# Patient Record
Sex: FEMALE | Race: BLACK OR AFRICAN AMERICAN | Employment: OTHER | URBAN - METROPOLITAN AREA
[De-identification: names, ages, dates, MRNs, and addresses within clinical notes are randomized per-mention and may not be internally consistent; named-entity substitution may affect disease eponyms.]

---

## 2017-07-28 ENCOUNTER — OP HISTORICAL/CONVERTED ENCOUNTER (OUTPATIENT)
Dept: OTHER | Age: 82
End: 2017-07-28

## 2017-09-19 ENCOUNTER — IP HISTORICAL/CONVERTED ENCOUNTER (OUTPATIENT)
Dept: OTHER | Age: 82
End: 2017-09-19

## 2018-06-21 ENCOUNTER — OP HISTORICAL/CONVERTED ENCOUNTER (OUTPATIENT)
Dept: OTHER | Age: 83
End: 2018-06-21

## 2018-06-28 ENCOUNTER — OP HISTORICAL/CONVERTED ENCOUNTER (OUTPATIENT)
Dept: OTHER | Age: 83
End: 2018-06-28

## 2018-06-29 ENCOUNTER — OP HISTORICAL/CONVERTED ENCOUNTER (OUTPATIENT)
Dept: OTHER | Age: 83
End: 2018-06-29

## 2018-07-31 ENCOUNTER — OP HISTORICAL/CONVERTED ENCOUNTER (OUTPATIENT)
Dept: OTHER | Age: 83
End: 2018-07-31

## 2018-08-14 ENCOUNTER — OP HISTORICAL/CONVERTED ENCOUNTER (OUTPATIENT)
Dept: OTHER | Age: 83
End: 2018-08-14

## 2018-09-19 ENCOUNTER — OP HISTORICAL/CONVERTED ENCOUNTER (OUTPATIENT)
Dept: OTHER | Age: 83
End: 2018-09-19

## 2019-01-08 ENCOUNTER — ED HISTORICAL/CONVERTED ENCOUNTER (OUTPATIENT)
Dept: OTHER | Age: 84
End: 2019-01-08

## 2019-01-16 ENCOUNTER — OP HISTORICAL/CONVERTED ENCOUNTER (OUTPATIENT)
Dept: OTHER | Age: 84
End: 2019-01-16

## 2019-03-11 ENCOUNTER — OFFICE VISIT (OUTPATIENT)
Dept: FAMILY MEDICINE CLINIC | Age: 84
End: 2019-03-11

## 2019-03-11 VITALS
HEIGHT: 60 IN | OXYGEN SATURATION: 97 % | SYSTOLIC BLOOD PRESSURE: 173 MMHG | TEMPERATURE: 97.8 F | BODY MASS INDEX: 30.43 KG/M2 | DIASTOLIC BLOOD PRESSURE: 64 MMHG | WEIGHT: 155 LBS | RESPIRATION RATE: 16 BRPM | HEART RATE: 53 BPM

## 2019-03-11 DIAGNOSIS — E87.0 HYPERNATREMIA: ICD-10-CM

## 2019-03-11 DIAGNOSIS — H93.19 TINNITUS, UNSPECIFIED LATERALITY: ICD-10-CM

## 2019-03-11 DIAGNOSIS — G47.8 UNREFRESHED BY SLEEP: ICD-10-CM

## 2019-03-11 DIAGNOSIS — E11.65 TYPE 2 DIABETES MELLITUS WITH HYPERGLYCEMIA, WITHOUT LONG-TERM CURRENT USE OF INSULIN (HCC): ICD-10-CM

## 2019-03-11 DIAGNOSIS — Z13.220 SCREENING FOR HYPERLIPIDEMIA: ICD-10-CM

## 2019-03-11 DIAGNOSIS — R06.83 SNORING: Primary | ICD-10-CM

## 2019-03-11 DIAGNOSIS — I50.9 OTHER CONGESTIVE HEART FAILURE (HCC): ICD-10-CM

## 2019-03-11 DIAGNOSIS — N28.9 ABNORMAL KIDNEY FUNCTION: ICD-10-CM

## 2019-03-11 RX ORDER — FUROSEMIDE 40 MG/1
40 TABLET ORAL DAILY
COMMUNITY
End: 2019-08-27 | Stop reason: ALTCHOICE

## 2019-03-11 RX ORDER — LOSARTAN POTASSIUM 100 MG/1
100 TABLET ORAL DAILY
COMMUNITY
End: 2020-03-04 | Stop reason: SDUPTHER

## 2019-03-11 RX ORDER — AMIODARONE HYDROCHLORIDE 200 MG/1
200 TABLET ORAL DAILY
COMMUNITY

## 2019-03-11 RX ORDER — GLYBURIDE 5 MG/1
TABLET ORAL
COMMUNITY
End: 2020-02-12 | Stop reason: SDUPTHER

## 2019-03-11 RX ORDER — LANOLIN ALCOHOL/MO/W.PET/CERES
400 CREAM (GRAM) TOPICAL DAILY
COMMUNITY

## 2019-03-11 RX ORDER — HYDRALAZINE HYDROCHLORIDE 25 MG/1
1 TABLET, FILM COATED ORAL 2 TIMES DAILY
COMMUNITY
Start: 2019-02-20

## 2019-03-11 RX ORDER — APIXABAN 5 MG/1
1 TABLET, FILM COATED ORAL 2 TIMES DAILY
COMMUNITY
Start: 2019-02-22 | End: 2019-10-07 | Stop reason: SDUPTHER

## 2019-03-11 RX ORDER — AMLODIPINE BESYLATE 5 MG/1
1 TABLET ORAL 2 TIMES DAILY
COMMUNITY
Start: 2019-01-09 | End: 2020-06-18 | Stop reason: SDUPTHER

## 2019-03-11 RX ORDER — CARVEDILOL 12.5 MG/1
1.5 TABLET ORAL 2 TIMES DAILY
COMMUNITY
Start: 2019-02-22

## 2019-03-11 NOTE — PROGRESS NOTES
Chief Complaint   Patient presents with    New Patient    CHF    Irregular Heart Beat    Diabetes    Ringing in Ear    Cough     chronic dry cough      she is a 80y.o. year old female who presents for evalution. She has CHF but no known heart attack, she has a h/o irregular heart beat. She has diabetes and she checks blood sugar regularly    She recently broke her foot -the fracture was noted in January but she had the fall in November    Her Bp is hard to control, her daughter witnessed her snoring and snorting          Reviewed PmHx, RxHx, FmHx, SocHx, AllgHx and updated and dated in the chart. Aspirin yes ____   No____ N/A____    There are no active problems to display for this patient. Nurse notes were reviewed and copied and are correct  Review of Systems - negative except as listed above in the HPI    Objective:     Vitals:    03/11/19 1428   BP: 173/64   Pulse: (!) 53   Resp: 16   Temp: 97.8 °F (36.6 °C)   TempSrc: Oral   SpO2: 97%   Weight: 155 lb (70.3 kg)   Height: 5' (1.524 m)     Physical Examination: General appearance - alert, well appearing, and in no distress  Mental status - alert, oriented to person, place, and time  Mouth - mucous membranes moist, pharynx normal without lesions  Neck - supple, no significant adenopathy  Chest - clear to auscultation, no wheezes, rales or rhonchi, symmetric air entry  Heart - normal rate, regular rhythm, normal S1, S2, no murmurs, rubs, clicks or gallops  Abdomen - soft, nontender, nondistended, no masses or organomegaly  Neurological - alert, oriented, normal speech, no focal findings or movement disorder noted  Musculoskeletal - no joint tenderness, deformity or swelling  Extremities - peripheral pulses normal, no pedal edema, no clubbing or cyanosis  Skin - normal coloration and turgor, no rashes, no suspicious skin lesions noted      Assessment/ Plan:   Diagnoses and all orders for this visit:    1.  Snoring  -     SLEEP MEDICINE REFERRAL  She has signs and sx of SHAUN, getting evaluated  2. Unrefreshed by sleep  -     SLEEP MEDICINE REFERRAL  See above  3. Other congestive heart failure (HCC)  stable  4. Tinnitus, unspecified laterality    5. Type 2 diabetes mellitus with hyperglycemia, without long-term current use of insulin (HCC)  6. Cough: possibly losartan also possible allergy but not worse at night     Follow-up Disposition:  Return in about 3 months (around 6/11/2019). ICD-10-CM ICD-9-CM    1. Snoring R06.83 786.09 SLEEP MEDICINE REFERRAL   2. Unrefreshed by sleep G47.8 780.59 SLEEP MEDICINE REFERRAL   3. Other congestive heart failure (HCC) I50.9 428.0    4. Tinnitus, unspecified laterality H93.19 388.30    5. Type 2 diabetes mellitus with hyperglycemia, without long-term current use of insulin (HCC) E11.65 250.00 HEMOGLOBIN A1C WITH EAG     989.00 METABOLIC PANEL, COMPREHENSIVE   6. Screening for hyperlipidemia Z13.220 V77.91 LIPID PANEL       I have discussed the diagnosis with the patient and the intended plan as seen in the above orders. The patient has received an after-visit summary and questions were answered concerning future plans. Medication Side Effects and Warnings were discussed with patient: yes  Patient Labs were reviewed and or requested: yes  Patient Past Records were reviewed and or requested: yes        There are no Patient Instructions on file for this visit.     The patient verbalizes understanding and agrees with the plan of care        Patient has the advanced directives booklet to review

## 2019-03-11 NOTE — PROGRESS NOTES
1. Have you been to the ER, urgent care clinic since your last visit? Hospitalized since your last visit? No    2. Have you seen or consulted any other health care providers outside of the [address] [place] [address] since your last visit? Include any pap smears or colon screening.  No     Chief Complaint   Patient presents with    New Patient    CHF    Irregular Heart Beat    Diabetes    Ringing in Ear    Cough     chronic dry cough

## 2019-03-12 LAB
ALBUMIN SERPL-MCNC: 4.1 G/DL (ref 3.5–4.7)
ALBUMIN/GLOB SERPL: 1.5 {RATIO} (ref 1.2–2.2)
ALP SERPL-CCNC: 73 IU/L (ref 39–117)
ALT SERPL-CCNC: 12 IU/L (ref 0–32)
AST SERPL-CCNC: 29 IU/L (ref 0–40)
BILIRUB SERPL-MCNC: 0.4 MG/DL (ref 0–1.2)
BUN SERPL-MCNC: 30 MG/DL (ref 8–27)
BUN/CREAT SERPL: 22 (ref 12–28)
CALCIUM SERPL-MCNC: 9.8 MG/DL (ref 8.7–10.3)
CHLORIDE SERPL-SCNC: 106 MMOL/L (ref 96–106)
CHOLEST SERPL-MCNC: 172 MG/DL (ref 100–199)
CO2 SERPL-SCNC: 20 MMOL/L (ref 20–29)
CREAT SERPL-MCNC: 1.34 MG/DL (ref 0.57–1)
EST. AVERAGE GLUCOSE BLD GHB EST-MCNC: 137 MG/DL
GLOBULIN SER CALC-MCNC: 2.8 G/DL (ref 1.5–4.5)
GLUCOSE SERPL-MCNC: 148 MG/DL (ref 65–99)
HBA1C MFR BLD: 6.4 % (ref 4.8–5.6)
HDLC SERPL-MCNC: 69 MG/DL
INTERPRETATION, 910389: NORMAL
INTERPRETATION: NORMAL
LDLC SERPL CALC-MCNC: 91 MG/DL (ref 0–99)
Lab: NORMAL
PDF IMAGE, 910387: NORMAL
POTASSIUM SERPL-SCNC: 4 MMOL/L (ref 3.5–5.2)
PROT SERPL-MCNC: 6.9 G/DL (ref 6–8.5)
SODIUM SERPL-SCNC: 146 MMOL/L (ref 134–144)
TRIGL SERPL-MCNC: 62 MG/DL (ref 0–149)
VLDLC SERPL CALC-MCNC: 12 MG/DL (ref 5–40)

## 2019-03-21 NOTE — PROGRESS NOTES
The blood sugar is still well controlled The kidney is abnormal, I do not have an old result  to compare it to in order to know if this is stable. I want to repeat this in 1 month.  Just come in for a lab test. The  liver is normal

## 2019-03-26 NOTE — PROGRESS NOTES
Spoke with pt daughter María Pedraza and advised of lab results/recommendations. Verbalized understanding and no further questions. Daughter stated she will have pt previous PCP send records to office to compare previous labs.

## 2019-04-02 ENCOUNTER — OP HISTORICAL/CONVERTED ENCOUNTER (OUTPATIENT)
Dept: OTHER | Age: 84
End: 2019-04-02

## 2019-04-03 LAB
BUN SERPL-MCNC: 26 MG/DL (ref 8–27)
BUN/CREAT SERPL: 21 (ref 12–28)
CALCIUM SERPL-MCNC: 9.6 MG/DL (ref 8.7–10.3)
CHLORIDE SERPL-SCNC: 107 MMOL/L (ref 96–106)
CO2 SERPL-SCNC: 25 MMOL/L (ref 20–29)
CREAT SERPL-MCNC: 1.25 MG/DL (ref 0.57–1)
GLUCOSE SERPL-MCNC: 106 MG/DL (ref 65–99)
INTERPRETATION: NORMAL
POTASSIUM SERPL-SCNC: 4.5 MMOL/L (ref 3.5–5.2)
SODIUM SERPL-SCNC: 148 MMOL/L (ref 134–144)

## 2019-04-04 NOTE — PROGRESS NOTES
The sodium level keeps going up.  I am referring you to a kidney specialist. Avoid salted foods and avoid electrolyte drinks like gatoraid

## 2019-04-05 ENCOUNTER — TELEPHONE (OUTPATIENT)
Dept: FAMILY MEDICINE CLINIC | Age: 84
End: 2019-04-05

## 2019-04-05 NOTE — TELEPHONE ENCOUNTER
Patient is calling back to speak to Ami from the message she left to call the office back    Please advise thanks

## 2019-04-05 NOTE — TELEPHONE ENCOUNTER
Spoke with pt and she provided me contact information for Dr. Fran Zeng and contact number for provider [phone]. Contacted that office and they do not have a provider by that name. Pt was advised to have daughter Eleni Styles to contact office back for further assistance.

## 2019-04-05 NOTE — PROGRESS NOTES
Spoke with pt advised of lab results/recommendaitons. Requested pt to have daughter Jaskaran Palmer contact office back to explain results and provide with specialist information. Pt verbalized understanding and no further questions.

## 2019-05-01 ENCOUNTER — OP HISTORICAL/CONVERTED ENCOUNTER (OUTPATIENT)
Dept: OTHER | Age: 84
End: 2019-05-01

## 2019-06-11 ENCOUNTER — TELEPHONE (OUTPATIENT)
Dept: FAMILY MEDICINE CLINIC | Age: 84
End: 2019-06-11

## 2019-06-11 NOTE — TELEPHONE ENCOUNTER
Please send the PT records to Dr. Dragan Layne for appt 6/20. Fax to [phone]. AttnVergie Wilfrid.  Need last 3 labs, CMP/BMP, office notes, medication list, renal or abdominal ultrasound

## 2019-07-17 ENCOUNTER — OP HISTORICAL/CONVERTED ENCOUNTER (OUTPATIENT)
Dept: OTHER | Age: 84
End: 2019-07-17

## 2019-08-08 ENCOUNTER — OFFICE VISIT (OUTPATIENT)
Dept: FAMILY MEDICINE CLINIC | Age: 84
End: 2019-08-08

## 2019-08-08 VITALS
BODY MASS INDEX: 29.84 KG/M2 | RESPIRATION RATE: 16 BRPM | SYSTOLIC BLOOD PRESSURE: 162 MMHG | WEIGHT: 152 LBS | HEIGHT: 60 IN | HEART RATE: 58 BPM | DIASTOLIC BLOOD PRESSURE: 68 MMHG | TEMPERATURE: 98.1 F

## 2019-08-08 DIAGNOSIS — M25.561 CHRONIC PAIN OF RIGHT KNEE: Primary | ICD-10-CM

## 2019-08-08 DIAGNOSIS — G89.29 CHRONIC PAIN OF RIGHT KNEE: Primary | ICD-10-CM

## 2019-08-08 DIAGNOSIS — E87.0 HYPERNATREMIA: ICD-10-CM

## 2019-08-08 DIAGNOSIS — N28.9 ABNORMAL KIDNEY FUNCTION: ICD-10-CM

## 2019-08-08 DIAGNOSIS — E11.65 TYPE 2 DIABETES MELLITUS WITH HYPERGLYCEMIA, WITHOUT LONG-TERM CURRENT USE OF INSULIN (HCC): ICD-10-CM

## 2019-08-08 RX ORDER — DICLOFENAC SODIUM 10 MG/G
2 GEL TOPICAL 4 TIMES DAILY
Qty: 1 EACH | Refills: 3 | Status: SHIPPED | OUTPATIENT
Start: 2019-08-08

## 2019-08-08 NOTE — PROGRESS NOTES
Chief Complaint   Patient presents with    Hypertension     3 mth f/u     she is a 80y.o. year old female who presents for evalution. 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 Aspirin yes ____   No____ N/A____    There are no active problems to display for this patient. Nurse notes were reviewed and copied and are correct  Review of Systems - negative except as listed above in the HPI    Objective:     Vitals:    08/08/19 1044 08/08/19 1101   BP: 178/65 162/68   Pulse: (!) 58    Resp: 16    Temp: 98.1 °F (36.7 °C)    TempSrc: Oral    Weight: 152 lb (68.9 kg)    Height: 5' (1.524 m)        Physical Examination: General appearance - alert, well appearing, and in no distress  Mental status - alert, oriented to person, place, and time  Neck - supple, no significant adenopathy  Chest - clear to auscultation, no wheezes, rales or rhonchi, symmetric air entry  Heart - normal rate, regular rhythm, normal S1, S2, 1/6 SEMmurmurs, rubs, clicks or gallops  Musculoskeletal - no joint tenderness, deformity or swelling  Extremities - peripheral pulses normal, no pedal edema, no clubbing or cyanosis      Assessment/ Plan:   Diagnoses and all orders for this visit:    1. Chronic pain of right knee  -     diclofenac (VOLTAREN) 1 % gel; Apply 2 g to affected area four (4) times daily. Do not take ibuprofen with this. May take tylenol if needed  2. Type 2 diabetes mellitus with hyperglycemia, without long-term current use of insulin (HCC)  -     HEMOGLOBIN A1C WITH EAG    3. Abnormal kidney function  -     METABOLIC PANEL, COMPREHENSIVE    4. Hypernatremia  -     METABOLIC PANEL, COMPREHENSIVE           ICD-10-CM ICD-9-CM    1.  Chronic pain of right knee M25.561 719.46 diclofenac (VOLTAREN) 1 % gel    G89.29 338.29    2. Type 2 diabetes mellitus with hyperglycemia, without long-term current use of insulin (HCC) E11.65 250.00 HEMOGLOBIN A1C WITH EAG     790.29    3. Abnormal kidney function A04.5 644.3 METABOLIC PANEL, COMPREHENSIVE   4. Hypernatremia L32.1 625.6 METABOLIC PANEL, COMPREHENSIVE       I have discussed the diagnosis with the patient and the intended plan as seen in the above orders. The patient has received an after-visit summary and questions were answered concerning future plans. Medication Side Effects and Warnings were discussed with patient: yes  Patient Labs were reviewed and or requested: yes  Patient Past Records were reviewed and or requested: yes        There are no Patient Instructions on file for this visit.     The patient verbalizes understanding and agrees with the plan of care        Patient has the advanced directives booklet to review

## 2019-08-08 NOTE — PROGRESS NOTES
1. Have you been to the ER, urgent care clinic since your last visit? Hospitalized since your last visit? No    2. Have you seen or consulted any other health care providers outside of the [address] [place] [address] since your last visit? Include any pap smears or colon screening.  Cardiology    Chief Complaint   Patient presents with    Hypertension     3 mth f/u

## 2019-08-09 LAB
ALBUMIN SERPL-MCNC: 4 G/DL (ref 3.5–4.7)
ALBUMIN/GLOB SERPL: 1.3 {RATIO} (ref 1.2–2.2)
ALP SERPL-CCNC: 140 IU/L (ref 39–117)
ALT SERPL-CCNC: 33 IU/L (ref 0–32)
AST SERPL-CCNC: 35 IU/L (ref 0–40)
BILIRUB SERPL-MCNC: 0.3 MG/DL (ref 0–1.2)
BUN SERPL-MCNC: 28 MG/DL (ref 8–27)
BUN/CREAT SERPL: 25 (ref 12–28)
CALCIUM SERPL-MCNC: 9.4 MG/DL (ref 8.7–10.3)
CHLORIDE SERPL-SCNC: 108 MMOL/L (ref 96–106)
CO2 SERPL-SCNC: 24 MMOL/L (ref 20–29)
CREAT SERPL-MCNC: 1.11 MG/DL (ref 0.57–1)
EST. AVERAGE GLUCOSE BLD GHB EST-MCNC: 126 MG/DL
GLOBULIN SER CALC-MCNC: 3 G/DL (ref 1.5–4.5)
GLUCOSE SERPL-MCNC: 116 MG/DL (ref 65–99)
HBA1C MFR BLD: 6 % (ref 4.8–5.6)
INTERPRETATION: NORMAL
POTASSIUM SERPL-SCNC: 4.2 MMOL/L (ref 3.5–5.2)
PROT SERPL-MCNC: 7 G/DL (ref 6–8.5)
SODIUM SERPL-SCNC: 147 MMOL/L (ref 134–144)

## 2019-08-27 ENCOUNTER — OFFICE VISIT (OUTPATIENT)
Dept: FAMILY MEDICINE CLINIC | Age: 84
End: 2019-08-27

## 2019-08-27 VITALS
OXYGEN SATURATION: 93 % | TEMPERATURE: 97.9 F | BODY MASS INDEX: 31.22 KG/M2 | WEIGHT: 159 LBS | RESPIRATION RATE: 16 BRPM | DIASTOLIC BLOOD PRESSURE: 57 MMHG | SYSTOLIC BLOOD PRESSURE: 159 MMHG | HEIGHT: 60 IN | HEART RATE: 55 BPM

## 2019-08-27 DIAGNOSIS — I50.9 OTHER CONGESTIVE HEART FAILURE (HCC): ICD-10-CM

## 2019-08-27 DIAGNOSIS — H93.13 TINNITUS OF BOTH EARS: ICD-10-CM

## 2019-08-27 DIAGNOSIS — R60.0 PEDAL EDEMA: ICD-10-CM

## 2019-08-27 DIAGNOSIS — E11.65 TYPE 2 DIABETES MELLITUS WITH HYPERGLYCEMIA, WITHOUT LONG-TERM CURRENT USE OF INSULIN (HCC): ICD-10-CM

## 2019-08-27 DIAGNOSIS — Z71.89 ADVANCED DIRECTIVES, COUNSELING/DISCUSSION: ICD-10-CM

## 2019-08-27 DIAGNOSIS — Z00.00 MEDICARE ANNUAL WELLNESS VISIT, SUBSEQUENT: Primary | ICD-10-CM

## 2019-08-27 DIAGNOSIS — I10 UNCONTROLLED HYPERTENSION: ICD-10-CM

## 2019-08-27 DIAGNOSIS — Z23 ENCOUNTER FOR IMMUNIZATION: ICD-10-CM

## 2019-08-27 DIAGNOSIS — N28.9 ABNORMAL KIDNEY FUNCTION: ICD-10-CM

## 2019-08-27 RX ORDER — CHLORTHALIDONE 25 MG/1
25 TABLET ORAL DAILY
Qty: 30 TAB | Refills: 2 | Status: SHIPPED | OUTPATIENT
Start: 2019-08-27 | End: 2020-03-04

## 2019-08-27 RX ORDER — FLUTICASONE PROPIONATE 50 MCG
2 SPRAY, SUSPENSION (ML) NASAL DAILY
Qty: 1 BOTTLE | Refills: 5 | Status: SHIPPED | OUTPATIENT
Start: 2019-08-27

## 2019-08-27 RX ORDER — CETIRIZINE HCL 10 MG
10 TABLET ORAL DAILY
Qty: 30 TAB | Refills: 2 | Status: SHIPPED | OUTPATIENT
Start: 2019-08-27 | End: 2020-09-29

## 2019-08-27 NOTE — PROGRESS NOTES
The patient verbalizes understanding and agrees with the plan of care        Patient has the advanced directives booklet to review  This is the Subsequent Medicare Annual Wellness Exam, performed 12 months or more after the Initial AWV or the last Subsequent AWV    I have reviewed the patient's medical history in detail and updated the computerized patient record. History   No past medical history on file. No past surgical history on file. Current Outpatient Medications   Medication Sig Dispense Refill    chlorthalidone (HYGROTEN) 25 mg tablet Take 1 Tab by mouth daily. 30 Tab 2    cetirizine (ZYRTEC) 10 mg tablet Take 1 Tab by mouth daily. 30 Tab 2    fluticasone propionate (FLONASE) 50 mcg/actuation nasal spray 2 Sprays by Both Nostrils route daily. 1 Bottle 5    diclofenac (VOLTAREN) 1 % gel Apply 2 g to affected area four (4) times daily. 1 Each 3    carvedilol (COREG) 12.5 mg tablet Take 1.5 Tabs by mouth two (2) times a day.  hydrALAZINE (APRESOLINE) 25 mg tablet Take 1 Tab by mouth two (2) times a day.  amLODIPine (NORVASC) 5 mg tablet Take 1 Tab by mouth two (2) times a day.  losartan (COZAAR) 100 mg tablet Take 100 mg by mouth daily.  glyBURIDE (DIABETA) 5 mg tablet Take  by mouth Daily (before breakfast).  ELIQUIS 5 mg tablet Take 1 Tab by mouth two (2) times a day.  amiodarone (CORDARONE) 200 mg tablet Take 200 mg by mouth daily.  magnesium oxide (MAG-OX) 400 mg tablet Take 400 mg by mouth daily. No Known Allergies  Family History   Problem Relation Age of Onset    Cancer Mother     Heart Disease Mother      Social History     Tobacco Use    Smoking status: Never Smoker    Smokeless tobacco: Never Used   Substance Use Topics    Alcohol use: No     Frequency: Never     There is no problem list on file for this patient.       Depression Risk Factor Screening:     3 most recent PHQ Screens 8/27/2019   Little interest or pleasure in doing things Not at all   Feeling down, depressed, irritable, or hopeless Not at all   Total Score PHQ 2 0     Alcohol Risk Factor Screening: You do not drink alcohol or very rarely. Functional Ability and Level of Safety:   Hearing Loss  Hearing is good. Activities of Daily Living  The home contains: no safety equipment. Patient does total self care    Fall Risk  Fall Risk Assessment, last 12 mths 8/27/2019   Able to walk? Yes   Fall in past 12 months? No       Abuse Screen  Patient is not abused    Cognitive Screening   Evaluation of Cognitive Function:  Has your family/caregiver stated any concerns about your memory: no  Normal, Clock Drawing test    Patient Care Team   Patient Care Team:  Sobeida Pagan MD as PCP - General (Family Practice)    Assessment/Plan   Education and counseling provided:  Are appropriate based on today's review and evaluation  End-of-Life planning (with patient's consent)    Diagnoses and all orders for this visit:    1. Medicare annual wellness visit, subsequent    3. Advanced directives, counseling/discussion  -     ADVANCE CARE PLANNING FIRST 30 MINS    3. Encounter for immunization  -     PNEUMOCOCCAL CONJ VACCINE 13 VALENT IM            Health Maintenance Due   Topic Date Due    EYE EXAM RETINAL OR DILATED  11[birth year]27/1945    DTaP/Tdap/Td series (1 - Tdap) 11/27/1956    Shingrix Vaccine Age 50> (1 of 2) 11/27/1985    GLAUCOMA SCREENING Q2Y  11/27/2000    Bone Densitometry (Dexa) Screening  11/27/2000     Chief Complaint   Patient presents with    Blood Pressure Check     she is a 80y.o. year old female who presents for evalution. She takes 5 BP meds but bp still high  She admits to eating a lot of salty foods  She is using the cpap nightly started a week ago  She does not exercise    She c/o tinnitis for a year or more. She went to ENT and they said it is not the ears          Reviewed PmHx, RxHx, FmHx, SocHx, AllgHx and updated and dated in the chart.     Aspirin yes ____   No____ N/A____    There are no active problems to display for this patient. Nurse notes were reviewed and copied and are correct  Review of Systems - negative except as listed above in the HPI    Objective:     Vitals:    08/27/19 1312   BP: 159/57   Pulse: (!) 55   Resp: 16   Temp: 97.9 °F (36.6 °C)   TempSrc: Oral   SpO2: 93%   Weight: 159 lb (72.1 kg)   Height: 5' (1.524 m)     Physical Examination: General appearance - alert, well appearing, and in no distress  Mental status - alert, oriented to person, place, and time  Neck - supple, no significant adenopathy  Chest - clear to auscultation, no wheezes, rales or rhonchi, symmetric air entry  Heart - normal rate, regular rhythm, normal S1, S2, no murmurs, rubs, clicks or gallops  Abdomen - soft, nontender, nondistended, no masses or organomegaly  Musculoskeletal - no joint tenderness, deformity or swelling  Extremities - peripheral pulses normal, no pedal edema, no clubbing or cyanosis  Skin - normal coloration and turgor, no rashes, no suspicious skin lesions noted     Sensory exam of the foot is normal, tested with the monofilament. Good pulses, no lesions or ulcers, good peripheral pulses. Assessment/ Plan:   Diagnoses and all orders for this visit:    1. Type 2 diabetes mellitus with hyperglycemia, without long-term current use of insulin ([facility])  -      DIABETES FOOT EXAM  -     MICROALBUMIN, UR, RAND W/ MICROALB/CREAT RATIO    2. Uncontrolled hypertension  The chlorthalidone has a longer halflife and will do better at controlling BP than lasix  3. Other congestive heart failure (HCC)  Stable now  4. Abnormal kidney function    5. Pedal edema  -     chlorthalidone (HYGROTEN) 25 mg tablet; Take 1 Tab by mouth daily. 6. Tinnitus of both ears  -     cetirizine (ZYRTEC) 10 mg tablet; Take 1 Tab by mouth daily. -     fluticasone propionate (FLONASE) 50 mcg/actuation nasal spray; 2 Sprays by Both Nostrils route daily.        Follow-up and Dispositions · Return in about 1 month (around 9/27/2019). ICD-10-CM ICD-9-CM    1. Type 2 diabetes mellitus with hyperglycemia, without long-term current use of insulin (HCC) E11.65 250.00  DIABETES FOOT EXAM     790.29 MICROALBUMIN, UR, RAND W/ MICROALB/CREAT RATIO   2. Medicare annual wellness visit, subsequent Z00.00 V70.0    3. Advanced directives, counseling/discussion Z71.89 V65.49 ADVANCE CARE PLANNING FIRST 30 MINS   4. Uncontrolled hypertension I10 401.9    5. Other congestive heart failure (HCC) I50.9 428.0    6. Abnormal kidney function N28.9 593.9    7. Pedal edema R60.0 782. 3 chlorthalidone (HYGROTEN) 25 mg tablet   8. Encounter for immunization Z23 V03.89 PNEUMOCOCCAL CONJ VACCINE 13 VALENT IM   9. Tinnitus of both ears H93.13 388.30 cetirizine (ZYRTEC) 10 mg tablet      fluticasone propionate (FLONASE) 50 mcg/actuation nasal spray       I have discussed the diagnosis with the patient and the intended plan as seen in the above orders. The patient has received an after-visit summary and questions were answered concerning future plans. Medication Side Effects and Warnings were discussed with patient: yes  Patient Labs were reviewed and or requested: yes  Patient Past Records were reviewed and or requested: yes        Patient Instructions       Medicare Wellness Visit, Female     The best way to live healthy is to have a lifestyle where you eat a well-balanced diet, exercise regularly, limit alcohol use, and quit all forms of tobacco/nicotine, if applicable. Regular preventive services are another way to keep healthy. Preventive services (vaccines, screening tests, monitoring & exams) can help personalize your care plan, which helps you manage your own care. Screening tests can find health problems at the earliest stages, when they are easiest to treat.    [facility] follows the current, evidence-based guidelines published by the [facility] [place] (USPSTF) when recommending preventive services for our patients. Because we follow these guidelines, sometimes recommendations change over time as research supports it. (For example, mammograms used to be recommended annually. Even though Medicare will still pay for an annual mammogram, the newer guidelines recommend a mammogram every two years for women of average risk.)  Of course, you and your doctor may decide to screen more often for some diseases, based on your risk and your health status. Preventive services for you include:  - Medicare offers their members a free annual wellness visit, which is time for you and your primary care provider to discuss and plan for your preventive service needs. Take advantage of this benefit every year!  -All adults over the age of 72 should receive the recommended pneumonia vaccines. Current USPSTF guidelines recommend a series of two vaccines for the best pneumonia protection.   -All adults should have a flu vaccine yearly and a tetanus vaccine every 10 years. All adults age 61 and older should receive a shingles vaccine once in their lifetime.    -A bone mass density test is recommended when a woman turns 65 to screen for osteoporosis. This test is only recommended one time, as a screening. Some providers will use this same test as a disease monitoring tool if you already have osteoporosis. -All adults age 38-68 who are overweight should have a diabetes screening test once every three years.   -Other screening tests and preventive services for persons with diabetes include: an eye exam to screen for diabetic retinopathy, a kidney function test, a foot exam, and stricter control over your cholesterol.   -Cardiovascular screening for adults with routine risk involves an electrocardiogram (ECG) at intervals determined by your doctor.   -Colorectal cancer screenings should be done for adults age 54-65 with no increased risk factors for colorectal cancer.   There are a number of acceptable methods of screening for this type of cancer. Each test has its own benefits and drawbacks. Discuss with your doctor what is most appropriate for you during your annual wellness visit. The different tests include: colonoscopy (considered the best screening method), a fecal occult blood test, a fecal DNA test, and sigmoidoscopy. -Breast cancer screenings are recommended every other year for women of normal risk, age 54-69.  -Cervical cancer screenings for women over age 72 are only recommended with certain risk factors.   -All adults born between [facility] should be screened once for Hepatitis C.      Here is a list of your current Health Maintenance items (your personalized list of preventive services) with a due date:  Health Maintenance Due   Topic Date Due    Diabetic Foot Care  11[birth year]27/1945    Albumin Urine Test  11/27/1945    Eye Exam  11[birth year]27/1945    DTaP/Tdap/Td  (1 - Tdap) 11/27/1956    Shingles Vaccine (1 of 2) 11/27/1985    Glaucoma Screening   11/27/2000    Bone Mineral Density   11/27/2000    Pneumococcal Vaccine (1 of 2 - PCV13) 11/27/2000    Annual Well Visit  10/04/2018

## 2019-08-27 NOTE — PROGRESS NOTES
1. Have you been to the ER, urgent care clinic since your last visit? Hospitalized since your last visit? No    2. Have you seen or consulted any other health care providers outside of the [address] [place] [address] since your last visit? Include any pap smears or colon screening.  No     Chief Complaint   Patient presents with    Blood Pressure Check

## 2019-08-27 NOTE — PATIENT INSTRUCTIONS
Medicare Wellness Visit, Female     The best way to live healthy is to have a lifestyle where you eat a well-balanced diet, exercise regularly, limit alcohol use, and quit all forms of tobacco/nicotine, if applicable. Regular preventive services are another way to keep healthy. Preventive services (vaccines, screening tests, monitoring & exams) can help personalize your care plan, which helps you manage your own care. Screening tests can find health problems at the earliest stages, when they are easiest to treat. [facility] follows the current, evidence-based guidelines published by the [facility] ([facility]) when recommending preventive services for our patients. Because we follow these guidelines, sometimes recommendations change over time as research supports it. (For example, mammograms used to be recommended annually. Even though Medicare will still pay for an annual mammogram, the newer guidelines recommend a mammogram every two years for women of average risk.)  Of course, you and your doctor may decide to screen more often for some diseases, based on your risk and your health status. Preventive services for you include:  - Medicare offers their members a free annual wellness visit, which is time for you and your primary care provider to discuss and plan for your preventive service needs. Take advantage of this benefit every year!  -All adults over the age of 72 should receive the recommended pneumonia vaccines. Current USPSTF guidelines recommend a series of two vaccines for the best pneumonia protection.   -All adults should have a flu vaccine yearly and a tetanus vaccine every 10 years. All adults age 61 and older should receive a shingles vaccine once in their lifetime.    -A bone mass density test is recommended when a woman turns 65 to screen for osteoporosis. This test is only recommended one time, as a screening.  Some providers will use this same test as a disease monitoring tool if you already have osteoporosis. -All adults age 38-68 who are overweight should have a diabetes screening test once every three years.   -Other screening tests and preventive services for persons with diabetes include: an eye exam to screen for diabetic retinopathy, a kidney function test, a foot exam, and stricter control over your cholesterol.   -Cardiovascular screening for adults with routine risk involves an electrocardiogram (ECG) at intervals determined by your doctor.   -Colorectal cancer screenings should be done for adults age 54-65 with no increased risk factors for colorectal cancer. There are a number of acceptable methods of screening for this type of cancer. Each test has its own benefits and drawbacks. Discuss with your doctor what is most appropriate for you during your annual wellness visit. The different tests include: colonoscopy (considered the best screening method), a fecal occult blood test, a fecal DNA test, and sigmoidoscopy. -Breast cancer screenings are recommended every other year for women of normal risk, age 54-69.  -Cervical cancer screenings for women over age 72 are only recommended with certain risk factors.   -All adults born between [facility] should be screened once for Hepatitis C.      Here is a list of your current Health Maintenance items (your personalized list of preventive services) with a due date:  Health Maintenance Due   Topic Date Due    Diabetic Foot Care  11/27/1945    Albumin Urine Test  11/27/1945    Eye Exam  11/27/1945    DTaP/Tdap/Td  (1 - Tdap) 11/27/1956    Shingles Vaccine (1 of 2) 11/27/1985    Glaucoma Screening   11/27/2000    Bone Mineral Density   11/27/2000    Pneumococcal Vaccine (1 of 2 - PCV13) 11/27/2000    Annual Well Visit  10/04/2018

## 2019-08-27 NOTE — ACP (ADVANCE CARE PLANNING)
Advance Care Planning    Advance Care Planning (ACP) Provider Conversation Snapshot    Date of ACP Conversation: 08/27/19  Persons included in Conversation:  patient  Length of ACP Conversation in minutes:  16 minutes    Authorized Decision Maker (if patient is incapable of making informed decisions): This person is: Other Legally Authorized Decision Maker (e.g. Next of Kin)            For Patients with Decision Making Capacity:   Values/Goals: Exploration of values, goals, and preferences if recovery is not expected, even with continued medical treatment in the event of:  Imminent death  Severe, permanent brain injury  \"In these circumstances, what matters most to you? \"  Care focused more on comfort or quality of life.     Conversation Outcomes / Follow-Up Plan:   Completed new Advance Directive

## 2019-08-28 LAB
ALBUMIN/CREAT UR: 47.9 MG/G CREAT (ref 0–30)
CREAT UR-MCNC: 30.5 MG/DL
MICROALBUMIN UR-MCNC: 14.6 UG/ML

## 2019-08-28 NOTE — PROGRESS NOTES
The kidney is abnormal but better  The liver is abnormal this time, I need to recheck this and do some additional tests.  Make an appointment to come back in  The blood sugar is getting better  And is in good control

## 2019-09-09 NOTE — PROGRESS NOTES
Additional call placed but no answer. Letter with results/recommendations sent to pt address on file.

## 2019-09-24 ENCOUNTER — OFFICE VISIT (OUTPATIENT)
Dept: FAMILY MEDICINE CLINIC | Age: 84
End: 2019-09-24

## 2019-09-24 VITALS
BODY MASS INDEX: 31.41 KG/M2 | DIASTOLIC BLOOD PRESSURE: 59 MMHG | WEIGHT: 160 LBS | HEIGHT: 60 IN | SYSTOLIC BLOOD PRESSURE: 144 MMHG | HEART RATE: 56 BPM | OXYGEN SATURATION: 93 % | TEMPERATURE: 98.2 F | RESPIRATION RATE: 16 BRPM

## 2019-09-24 DIAGNOSIS — I10 UNCONTROLLED HYPERTENSION: Primary | ICD-10-CM

## 2019-09-24 DIAGNOSIS — Z23 ENCOUNTER FOR IMMUNIZATION: ICD-10-CM

## 2019-09-24 NOTE — PROGRESS NOTES
1. Have you been to the ER, urgent care clinic since your last visit? Hospitalized since your last visit? No    2. Have you seen or consulted any other health care providers outside of the [address] [place] [address] since your last visit? Include any pap smears or colon screening.  No     Chief Complaint   Patient presents with    Follow-up     1 MTH

## 2019-09-24 NOTE — PROGRESS NOTES
Chief Complaint   Patient presents with    Follow-up     1 MTH     she is a 80y.o. year old female who presents for evalution. Here to follow up on bp  All have been high  I changed diuretic to chlorthalidone last month    She does not check her bp at home   She uses cpap, started a month ago        Reviewed PmHx, RxHx, FmHx, SocHx, AllgHx and updated and dated in the chart. Aspirin yes ____   No____ N/A____    There are no active problems to display for this patient. Nurse notes were reviewed and copied and are correct  Review of Systems - negative except as listed above in the HPI    Objective:     Vitals:    09/24/19 1325   BP: 144/59   Pulse: (!) 56   Resp: 16   Temp: 98.2 °F (36.8 °C)   TempSrc: Oral   SpO2: 93%   Weight: 160 lb (72.6 kg)   Height: 5' (1.524 m)       Physical Examination: General appearance - alert, well appearing, and in no distress  Mental status - alert, oriented to person, place, and time  Chest - clear to auscultation, no wheezes, rales or rhonchi, symmetric air entry  Heart - normal rate, regular rhythm, normal S1, S2, no murmurs, rubs, clicks or gallops      Assessment/ Plan:   Diagnoses and all orders for this visit:    1. Uncontrolled hypertension  -     METABOLIC PANEL, BASIC    2. Encounter for immunization  -     INFLUENZA VACCINE INACTIVATED (IIV), SUBUNIT, ADJUVANTED, IM           ICD-10-CM ICD-9-CM    1. Uncontrolled hypertension B17 893.4 METABOLIC PANEL, BASIC   2. Encounter for immunization Z23 V03.89 INFLUENZA VACCINE INACTIVATED (IIV), SUBUNIT, ADJUVANTED, IM       I have discussed the diagnosis with the patient and the intended plan as seen in the above orders. The patient has received an after-visit summary and questions were answered concerning future plans.      Medication Side Effects and Warnings were discussed with patient: yes  Patient Labs were reviewed and or requested: yes  Patient Past Records were reviewed and or requested: yes        There are no Patient Instructions on file for this visit.     The patient verbalizes understanding and agrees with the plan of care        Patient has the advanced directives booklet to review

## 2019-09-25 LAB
BUN SERPL-MCNC: 41 MG/DL (ref 8–27)
BUN/CREAT SERPL: 27 (ref 12–28)
CALCIUM SERPL-MCNC: 9.6 MG/DL (ref 8.7–10.3)
CHLORIDE SERPL-SCNC: 104 MMOL/L (ref 96–106)
CO2 SERPL-SCNC: 22 MMOL/L (ref 20–29)
CREAT SERPL-MCNC: 1.52 MG/DL (ref 0.57–1)
GLUCOSE SERPL-MCNC: 108 MG/DL (ref 65–99)
INTERPRETATION: NORMAL
POTASSIUM SERPL-SCNC: 4.3 MMOL/L (ref 3.5–5.2)
SODIUM SERPL-SCNC: 144 MMOL/L (ref 134–144)

## 2019-10-07 RX ORDER — APIXABAN 5 MG/1
5 TABLET, FILM COATED ORAL 2 TIMES DAILY
Qty: 60 TAB | Refills: 2 | Status: SHIPPED | OUTPATIENT
Start: 2019-10-07 | End: 2020-01-19

## 2019-10-07 NOTE — TELEPHONE ENCOUNTER
Received VO from Dr. Fabiano Ocasio to approve requested medication Eliquis 5 mg for 30 day supply with 2 additional refills.

## 2019-10-24 ENCOUNTER — OFFICE VISIT (OUTPATIENT)
Dept: FAMILY MEDICINE CLINIC | Age: 84
End: 2019-10-24

## 2019-10-24 VITALS
WEIGHT: 156 LBS | OXYGEN SATURATION: 92 % | RESPIRATION RATE: 15 BRPM | SYSTOLIC BLOOD PRESSURE: 160 MMHG | HEIGHT: 60 IN | DIASTOLIC BLOOD PRESSURE: 63 MMHG | BODY MASS INDEX: 30.63 KG/M2 | HEART RATE: 51 BPM | TEMPERATURE: 97.7 F

## 2019-10-24 DIAGNOSIS — I10 UNCONTROLLED HYPERTENSION: Primary | ICD-10-CM

## 2019-10-24 DIAGNOSIS — N28.9 ABNORMAL KIDNEY FUNCTION: ICD-10-CM

## 2019-10-24 DIAGNOSIS — R68.89: ICD-10-CM

## 2019-10-24 NOTE — PROGRESS NOTES
1. Have you been to the ER, urgent care clinic since your last visit? Hospitalized since your last visit? No    2. Have you seen or consulted any other health care providers outside of the [address] [place] [address] since your last visit? Include any pap smears or colon screening.  No     Chief Complaint   Patient presents with    Hypertension     1 mth f/u

## 2019-10-24 NOTE — PROGRESS NOTES
Chief Complaint   Patient presents with    Hypertension     1 mth f/u     she is a 80y.o. year old female who presents for evalution. She is here to check BP  I at home BP is also high in the 150s and 160s  C/o a ringing in her head. This has been there at least 2 years at least  ENT said her ears are fine  She eats chips most days of the week  No fever or chills associated with the ear problem    Reviewed PmHx, RxHx, FmHx, SocHx, AllgHx and updated and dated in the chart. Aspirin yes ____   No____ N/A____    There are no active problems to display for this patient. Nurse notes were reviewed and copied and are correct  Review of Systems - negative except as listed above in the HPI    Objective:     Vitals:    10/24/19 1336   BP: 160/63   Pulse: (!) 51   Resp: 15   Temp: 97.7 °F (36.5 °C)   TempSrc: Oral   SpO2: 92%   Weight: 156 lb (70.8 kg)   Height: 5' (1.524 m)       Physical Examination: General appearance - alert, well appearing, and in no distress  Mental status - alert, oriented to person, place, and time  HEENT: normal  Neck - supple, no significant adenopathy  Chest - clear to auscultation, no wheezes, rales or rhonchi, symmetric air entry  Heart - normal rate, regular rhythm, normal S1, S2, no murmurs, rubs, clicks or gallops  Neuro: nl MS and symmetrical, CN normal  Ms: no edema      Assessment/ Plan:   Diagnoses and all orders for this visit:    1. Uncontrolled hypertension  Eating a lot of salt on chips  maxed out on every class of med even on hydralazine    Will go 1 week with no chips or crackers and recheck bp    2. Sensory problem of head  -     REFERRAL TO NEUROLOGY  She c/o a buzzing like  A bee sound in the head not the ears. Referring to neuro. Had ENT eval and was normal  3. Abnormal kidney function  -     METABOLIC PANEL, BASIC  Recheck next week     Follow-up and Dispositions    · Return in about 1 week (around 10/31/2019). ICD-10-CM ICD-9-CM    1.  Uncontrolled hypertension I10 401.9    2. Sensory problem of head R68.89 V48.4 REFERRAL TO NEUROLOGY   3. Abnormal kidney function B87.0 352.9 METABOLIC PANEL, BASIC       I have discussed the diagnosis with the patient and the intended plan as seen in the above orders. The patient has received an after-visit summary and questions were answered concerning future plans. Medication Side Effects and Warnings were discussed with patient: yes  Patient Labs were reviewed and or requested: yes  Patient Past Records were reviewed and or requested: yes        There are no Patient Instructions on file for this visit.     The patient verbalizes understanding and agrees with the plan of care        Patient has the advanced directives booklet to review

## 2019-10-24 NOTE — PROGRESS NOTES
The blood tests shows you are not drinking enough water. The kidney function needs to be rechecked. Come back in to repeat the lab.  Drink 32-64 ounces of water a day

## 2019-10-25 NOTE — PROGRESS NOTES
Pt notified of lab results during [address] on 10/24/19 pt will be back in to labs rechecked on 10/31/19

## 2019-10-31 ENCOUNTER — OFFICE VISIT (OUTPATIENT)
Dept: FAMILY MEDICINE CLINIC | Age: 84
End: 2019-10-31

## 2019-10-31 VITALS
SYSTOLIC BLOOD PRESSURE: 152 MMHG | DIASTOLIC BLOOD PRESSURE: 62 MMHG | HEIGHT: 60 IN | BODY MASS INDEX: 30.63 KG/M2 | WEIGHT: 156 LBS | OXYGEN SATURATION: 95 % | HEART RATE: 53 BPM | RESPIRATION RATE: 16 BRPM | TEMPERATURE: 97.7 F

## 2019-10-31 DIAGNOSIS — E11.65 TYPE 2 DIABETES MELLITUS WITH HYPERGLYCEMIA, WITHOUT LONG-TERM CURRENT USE OF INSULIN (HCC): Primary | ICD-10-CM

## 2019-10-31 DIAGNOSIS — I10 UNCONTROLLED HYPERTENSION: ICD-10-CM

## 2019-10-31 NOTE — PROGRESS NOTES
1. Have you been to the ER, urgent care clinic since your last visit? Hospitalized since your last visit? No    2. Have you seen or consulted any other health care providers outside of the [address] [place] [address] since your last visit? Include any pap smears or colon screening.  No     Chief Complaint   Patient presents with    Hypertension     1wk f/u

## 2019-10-31 NOTE — PROGRESS NOTES
Chief Complaint   Patient presents with    Hypertension     1wk f/u     she is a 80y.o. year old female who presents for evalution. She has stopped eating chips. Her bp is a little better but still high    Reviewed PmHx, RxHx, FmHx, SocHx, AllgHx and updated and dated in the chart. Aspirin yes ____   No____ N/A____    There are no active problems to display for this patient. Nurse notes were reviewed and copied and are correct  Review of Systems - negative except as listed above in the HPI    Objective:     Vitals:    10/31/19 1427   BP: 152/62   Pulse: (!) 53   Resp: 16   Temp: 97.7 °F (36.5 °C)   TempSrc: Oral   SpO2: 95%   Weight: 156 lb (70.8 kg)   Height: 5' (1.524 m)       Physical Examination: General appearance - alert, well appearing, and in no distress and oriented to person, place, and time  Mental status - alert, oriented to person, place, and time  Chest - clear to auscultation, no wheezes, rales or rhonchi, symmetric air entry  Heart - normal rate, regular rhythm, normal S1, S2, no murmurs, rubs, clicks or gallops  Musculoskeletal - no joint tenderness, deformity or swelling  Extremities - peripheral pulses normal, no pedal edema, no clubbing or cyanosis      Assessment/ Plan:   Diagnoses and all orders for this visit:    1. Type 2 diabetes mellitus with hyperglycemia, without long-term current use of insulin ([address]    2. Uncontrolled hypertension     getting better, cont avoiding salt  Recheck in 2 months  No med changes this time      ICD-10-CM ICD-9-CM    1. Type 2 diabetes mellitus with hyperglycemia, without long-term current use of insulin ([facility]) E11.65 250.00      790.29    2. Uncontrolled hypertension I10 401.9        I have discussed the diagnosis with the patient and the intended plan as seen in the above orders. The patient has received an after-visit summary and questions were answered concerning future plans.      Medication Side Effects and Warnings were discussed with patient: yes  Patient Labs were reviewed and or requested: yes  Patient Past Records were reviewed and or requested: yes        There are no Patient Instructions on file for this visit.     The patient verbalizes understanding and agrees with the plan of care        Patient has the advanced directives booklet to review

## 2019-11-09 LAB
BUN SERPL-MCNC: 32 MG/DL (ref 8–27)
BUN/CREAT SERPL: 23 (ref 12–28)
CALCIUM SERPL-MCNC: 9.4 MG/DL (ref 8.7–10.3)
CHLORIDE SERPL-SCNC: 103 MMOL/L (ref 96–106)
CO2 SERPL-SCNC: 26 MMOL/L (ref 20–29)
CREAT SERPL-MCNC: 1.38 MG/DL (ref 0.57–1)
GLUCOSE SERPL-MCNC: 158 MG/DL (ref 65–99)
INTERPRETATION: NORMAL
POTASSIUM SERPL-SCNC: 4.6 MMOL/L (ref 3.5–5.2)
SODIUM SERPL-SCNC: 143 MMOL/L (ref 134–144)

## 2019-11-12 NOTE — PROGRESS NOTES
The kidney function is abnormal but better than a month ago. Avoid salted foods , try to keep bp in good control to avoid more damage to kidney.  Recheck the blood work in 3 months

## 2020-01-19 RX ORDER — APIXABAN 5 MG/1
TABLET, FILM COATED ORAL
Qty: 60 TAB | Refills: 2 | Status: SHIPPED | OUTPATIENT
Start: 2020-01-19 | End: 2020-04-27

## 2020-02-12 RX ORDER — GLYBURIDE 5 MG/1
5 TABLET ORAL
Qty: 90 TAB | Refills: 1 | Status: SHIPPED | OUTPATIENT
Start: 2020-02-12 | End: 2021-06-28

## 2020-02-26 ENCOUNTER — OFFICE VISIT (OUTPATIENT)
Dept: FAMILY MEDICINE CLINIC | Age: 85
End: 2020-02-26

## 2020-02-26 VITALS
OXYGEN SATURATION: 97 % | RESPIRATION RATE: 16 BRPM | HEART RATE: 55 BPM | DIASTOLIC BLOOD PRESSURE: 80 MMHG | TEMPERATURE: 97.8 F | WEIGHT: 143 LBS | SYSTOLIC BLOOD PRESSURE: 156 MMHG | BODY MASS INDEX: 28.07 KG/M2 | HEIGHT: 60 IN

## 2020-02-26 DIAGNOSIS — I10 UNCONTROLLED HYPERTENSION: Primary | ICD-10-CM

## 2020-02-26 DIAGNOSIS — R63.4 WEIGHT LOSS: ICD-10-CM

## 2020-02-26 DIAGNOSIS — D64.9 ANEMIA, UNSPECIFIED TYPE: ICD-10-CM

## 2020-02-26 DIAGNOSIS — E11.65 TYPE 2 DIABETES MELLITUS WITH HYPERGLYCEMIA, WITHOUT LONG-TERM CURRENT USE OF INSULIN (HCC): ICD-10-CM

## 2020-02-26 DIAGNOSIS — B02.9 HERPES ZOSTER WITHOUT COMPLICATION: ICD-10-CM

## 2020-02-26 DIAGNOSIS — Z12.39 ENCOUNTER FOR SCREENING FOR MALIGNANT NEOPLASM OF BREAST: ICD-10-CM

## 2020-02-26 RX ORDER — VALACYCLOVIR HYDROCHLORIDE 500 MG/1
1000 TABLET, FILM COATED ORAL 3 TIMES DAILY
Qty: 42 TAB | Refills: 0 | Status: SHIPPED | OUTPATIENT
Start: 2020-02-26 | End: 2020-03-04

## 2020-02-26 NOTE — PROGRESS NOTES
1. Have you been to the ER, urgent care clinic since your last visit? Hospitalized since your last visit? No    2. Have you seen or consulted any other health care providers outside of the [address] [place] [address] since your last visit? Include any pap smears or colon screening. No       Chief Complaint   Patient presents with    Rash     Patient reports rash left side since monday. Patient report rash itchy with some redness.  Other     Patient states unable to taste food. Patient denies decreaseed appetite.     Decreased Appetite     Visit Vitals  /80 (BP 1 Location: Right arm, BP Patient Position: Sitting)   Pulse (!) 55   Temp 97.8 °F (36.6 °C) (Oral)   Resp 16   Ht 5' (1.524 m)   Wt 143 lb (64.9 kg)   SpO2 97%   BMI 27.93 kg/m²

## 2020-02-26 NOTE — PATIENT INSTRUCTIONS
Referral Order Notification. Summary of Care document document sent to Referred To Provider: Radha Garcia MD 
[address] [facility] [address] [place], [address] [facility]. 
  
  
  
You have been referred to: 
Radha Garcia [address] [facility] [address] [address] Phone: [phone] Fax: [phone]

## 2020-02-26 NOTE — PROGRESS NOTES
Chief Complaint   Patient presents with    Rash     Patient reports rash left side since monday. Patient report rash itchy with some redness. Patient states area feels bumpy.  Other     Patient states unable to taste food. Patient denies decreaseed appetite.  Decreased Appetite     she is a 80y.o. year old female who presents for evalution. she has diabetic retinopathy, has treatment coming up this week  She has lost 13 lbs since October  Her appetite is decreased she says food does not taste good     Woke up with a painful rash on the left flank, she has never had the zoxter vaccine    Passing bowels only when she takes something  She does not eat vegetables or fruit so she attributed it to that. This started a few years ago when she stopped cooking vegetables  The stool color is normal, no blood in it and no tarry stools      Reviewed PmHx, RxHx, FmHx, SocHx, AllgHx and updated and dated in the chart. Aspirin yes ____   No____ N/A____    There are no active problems to display for this patient. Nurse notes were reviewed and copied and are correct  Review of Systems - negative except as listed above in the HPI    Objective:     Vitals:    02/26/20 1324   BP: 156/80   Pulse: (!) 55   Resp: 16   Temp: 97.8 °F (36.6 °C)   TempSrc: Oral   SpO2: 97%   Weight: 143 lb (64.9 kg)   Height: 5' (1.524 m)       Physical Examination: General appearance - alert, well appearing, and in no distress  Mental status - alert, oriented to person, place, and time  Neck - supple, no significant adenopathy  Chest - clear to auscultation, no wheezes, rales or rhonchi, symmetric air entry  Heart - normal rate, regular rhythm, normal S1, S2, no murmurs, rubs, clicks or gallops  Abdomen - tenderness noted left lower quadrant  Skin - red rash on left flank, few vesicles      Assessment/ Plan:   Diagnoses and all orders for this visit:    1. Uncontrolled hypertension  bp slightly high  2.  Type 2 diabetes mellitus with hyperglycemia, without long-term current use of insulin (HCC)  -     HEMOGLOBIN A1C WITH EAG; Future  -     METABOLIC PANEL, COMPREHENSIVE; Future    3. Weight loss  -     REFERRAL TO GASTROENTEROLOGY    4. Herpes zoster without complication  -     valACYclovir (VALTREX) 500 mg tablet; Take 2 Tabs by mouth three (3) times daily for 7 days. Follow-up and Dispositions    · Return in about 1 month (around 3/26/2020). ICD-10-CM ICD-9-CM    1. Uncontrolled hypertension I10 401.9    2. Weight loss R63.4 783.21 REFERRAL TO GASTROENTEROLOGY      CBC W/O DIFF   3. Herpes zoster without complication B14.4 657.6 valACYclovir (VALTREX) 500 mg tablet   4. Type 2 diabetes mellitus with hyperglycemia, without long-term current use of insulin (HCC) E11.65 250.00 HEMOGLOBIN A1C WITH EAG     018.38 METABOLIC PANEL, COMPREHENSIVE       I have discussed the diagnosis with the patient and the intended plan as seen in the above orders. The patient has received an after-visit summary and questions were answered concerning future plans. Medication Side Effects and Warnings were discussed with patient: yes  Patient Labs were reviewed and or requested: yes  Patient Past Records were reviewed and or requested: yes        Patient Instructions   Referral Order Notification.     Summary of Care document document sent to   Referred To Provider: Axel Ibrahim MD  [address], [address] [place] [address] have been referred to:  Axel Ibrahim  [address] [id number]  Phone: [phone]  Fax: [phone]         The patient verbalizes understanding and agrees with the plan of care        Patient has the advanced directives booklet to review

## 2020-03-02 DIAGNOSIS — R60.0 PEDAL EDEMA: ICD-10-CM

## 2020-03-04 RX ORDER — CHLORTHALIDONE 25 MG/1
TABLET ORAL
Qty: 30 TAB | Refills: 3 | Status: SHIPPED | OUTPATIENT
Start: 2020-03-04 | End: 2020-10-23

## 2020-03-05 LAB
ALBUMIN SERPL-MCNC: 4.5 G/DL (ref 3.6–4.6)
ALBUMIN/GLOB SERPL: 1.7 {RATIO} (ref 1.2–2.2)
ALP SERPL-CCNC: 102 IU/L (ref 39–117)
ALT SERPL-CCNC: 19 IU/L (ref 0–32)
AST SERPL-CCNC: 26 IU/L (ref 0–40)
BASOPHILS # BLD AUTO: 0.1 X10E3/UL (ref 0–0.2)
BASOPHILS NFR BLD AUTO: 1 %
BILIRUB SERPL-MCNC: 0.3 MG/DL (ref 0–1.2)
BUN SERPL-MCNC: 40 MG/DL (ref 8–27)
BUN/CREAT SERPL: 25 (ref 12–28)
CALCIUM SERPL-MCNC: 10.2 MG/DL (ref 8.7–10.3)
CHLORIDE SERPL-SCNC: 102 MMOL/L (ref 96–106)
CO2 SERPL-SCNC: 28 MMOL/L (ref 20–29)
CREAT SERPL-MCNC: 1.62 MG/DL (ref 0.57–1)
EOSINOPHIL # BLD AUTO: 0.1 X10E3/UL (ref 0–0.4)
EOSINOPHIL NFR BLD AUTO: 2 %
ERYTHROCYTE [DISTWIDTH] IN BLOOD BY AUTOMATED COUNT: 12.7 % (ref 11.7–15.4)
GLOBULIN SER CALC-MCNC: 2.6 G/DL (ref 1.5–4.5)
GLUCOSE SERPL-MCNC: 154 MG/DL (ref 65–99)
HBA1C MFR BLD: 6 % (ref 4.8–5.6)
HCT VFR BLD AUTO: 32.1 % (ref 34–46.6)
HGB BLD-MCNC: 10.3 G/DL (ref 11.1–15.9)
IMM GRANULOCYTES # BLD AUTO: 0 X10E3/UL (ref 0–0.1)
IMM GRANULOCYTES NFR BLD AUTO: 0 %
INTERPRETATION: NORMAL
LYMPHOCYTES # BLD AUTO: 2.5 X10E3/UL (ref 0.7–3.1)
LYMPHOCYTES NFR BLD AUTO: 46 %
Lab: NORMAL
MCH RBC QN AUTO: 24.5 PG (ref 26.6–33)
MCHC RBC AUTO-ENTMCNC: 32.1 G/DL (ref 31.5–35.7)
MCV RBC AUTO: 76 FL (ref 79–97)
MONOCYTES # BLD AUTO: 0.6 X10E3/UL (ref 0.1–0.9)
MONOCYTES NFR BLD AUTO: 12 %
NEUTROPHILS # BLD AUTO: 2.1 X10E3/UL (ref 1.4–7)
NEUTROPHILS NFR BLD AUTO: 39 %
PLATELET # BLD AUTO: 228 X10E3/UL (ref 150–450)
POTASSIUM SERPL-SCNC: 4.3 MMOL/L (ref 3.5–5.2)
PROT SERPL-MCNC: 7.1 G/DL (ref 6–8.5)
RBC # BLD AUTO: 4.2 X10E6/UL (ref 3.77–5.28)
SODIUM SERPL-SCNC: 145 MMOL/L (ref 134–144)
SPECIMEN STATUS REPORT, ROLRST: NORMAL
WBC # BLD AUTO: 5.4 X10E3/UL (ref 3.4–10.8)

## 2020-03-07 RX ORDER — LOSARTAN POTASSIUM 100 MG/1
100 TABLET ORAL DAILY
Qty: 90 TAB | Refills: 3 | Status: SHIPPED | OUTPATIENT
Start: 2020-03-07

## 2020-03-08 RX ORDER — FERROUS SULFATE 325(65) MG
325 TABLET, DELAYED RELEASE (ENTERIC COATED) ORAL 2 TIMES DAILY
Qty: 60 TAB | Refills: 2 | Status: SHIPPED | OUTPATIENT
Start: 2020-03-08 | End: 2020-06-14

## 2020-03-09 ENCOUNTER — TELEPHONE (OUTPATIENT)
Dept: FAMILY MEDICINE CLINIC | Age: 85
End: 2020-03-09

## 2020-03-09 NOTE — PROGRESS NOTES
Contacted pt to inform lab results per Dr. Mendel Cons. Patient x2 id verified. Notified lab results to pt, verbalized understanding.  Appointment schedule for 3/19/20

## 2020-03-09 NOTE — TELEPHONE ENCOUNTER
Left message on vm to call office back. Left message to come into office to sign ABN due to insurance might not cover mammo. ABN left at  for signature.

## 2020-03-09 NOTE — PROGRESS NOTES
You are slightly anemic and the kidney function is more abnormal.  Anemia is often associated with a decrease in kidney function. I want you to come back to discuss evaluating the anemia. Keep an eye out to see if there is any sign of blood in your stool. I am ordering some iron for you to take twice a day  I do not see a mammogram on your result page- I am ordering one.   Make an appointment to come back next week

## 2020-06-19 ENCOUNTER — TELEPHONE (OUTPATIENT)
Dept: FAMILY MEDICINE CLINIC | Age: 85
End: 2020-06-19

## 2020-06-19 RX ORDER — AMLODIPINE BESYLATE 5 MG/1
5 TABLET ORAL 2 TIMES DAILY
Qty: 60 TAB | Refills: 2 | Status: SHIPPED | OUTPATIENT
Start: 2020-06-19 | End: 2020-12-19

## 2020-06-19 NOTE — TELEPHONE ENCOUNTER
----- Message from [employer] sent at 6/18/2020  6:12 PM EDT -----  Regarding: Dr. Beto Lagos first and last name and relationship (if not the patient): N/A  Best contact number(s): [phone]  Whose call is being returned: Pt did not know  Details to clarify the request: N/A

## 2020-09-14 ENCOUNTER — HOSPITAL ENCOUNTER (OUTPATIENT)
Dept: MAMMOGRAPHY | Age: 85
Discharge: HOME | End: 2020-09-14
Attending: INTERNAL MEDICINE
Payer: MEDICARE

## 2020-09-14 DIAGNOSIS — Z12.31 SCREENING MAMMOGRAM FOR HIGH-RISK PATIENT: ICD-10-CM

## 2020-09-14 PROCEDURE — 77063 BREAST TOMOSYNTHESIS BI: CPT

## 2020-09-24 DIAGNOSIS — H93.13 TINNITUS OF BOTH EARS: ICD-10-CM

## 2020-09-29 RX ORDER — CETIRIZINE HCL 10 MG
TABLET ORAL
Qty: 30 TAB | Refills: 0 | Status: SHIPPED | OUTPATIENT
Start: 2020-09-29

## 2020-10-07 ENCOUNTER — OFFICE VISIT (OUTPATIENT)
Dept: FAMILY MEDICINE CLINIC | Age: 85
End: 2020-10-07
Payer: MEDICARE

## 2020-10-07 VITALS
OXYGEN SATURATION: 99 % | WEIGHT: 139 LBS | HEIGHT: 60 IN | RESPIRATION RATE: 16 BRPM | DIASTOLIC BLOOD PRESSURE: 70 MMHG | SYSTOLIC BLOOD PRESSURE: 147 MMHG | HEART RATE: 57 BPM | TEMPERATURE: 98.7 F | BODY MASS INDEX: 27.29 KG/M2

## 2020-10-07 DIAGNOSIS — E11.65 TYPE 2 DIABETES MELLITUS WITH HYPERGLYCEMIA, WITHOUT LONG-TERM CURRENT USE OF INSULIN (HCC): ICD-10-CM

## 2020-10-07 DIAGNOSIS — Z23 ENCOUNTER FOR IMMUNIZATION: ICD-10-CM

## 2020-10-07 DIAGNOSIS — N18.4 CKD (CHRONIC KIDNEY DISEASE) STAGE 4, GFR 15-29 ML/MIN (HCC): ICD-10-CM

## 2020-10-07 DIAGNOSIS — Z13.31 SCREENING FOR DEPRESSION: ICD-10-CM

## 2020-10-07 DIAGNOSIS — Z71.89 ADVANCED DIRECTIVES, COUNSELING/DISCUSSION: ICD-10-CM

## 2020-10-07 DIAGNOSIS — I10 UNCONTROLLED HYPERTENSION: ICD-10-CM

## 2020-10-07 DIAGNOSIS — D64.9 ANEMIA, UNSPECIFIED TYPE: ICD-10-CM

## 2020-10-07 DIAGNOSIS — Z00.00 MEDICARE ANNUAL WELLNESS VISIT, SUBSEQUENT: Primary | ICD-10-CM

## 2020-10-07 PROBLEM — N18.30 CKD (CHRONIC KIDNEY DISEASE) STAGE 3, GFR 30-59 ML/MIN (HCC): Status: ACTIVE | Noted: 2020-10-07

## 2020-10-07 LAB
CREAT UR-MCNC: 138 MG/DL
MICROALBUMIN UR-MCNC: 4.07 MG/DL
MICROALBUMIN/CREAT UR-RTO: 29 MG/G (ref 0–30)

## 2020-10-07 PROCEDURE — G0008 ADMIN INFLUENZA VIRUS VAC: HCPCS | Performed by: FAMILY MEDICINE

## 2020-10-07 PROCEDURE — G8427 DOCREV CUR MEDS BY ELIG CLIN: HCPCS | Performed by: FAMILY MEDICINE

## 2020-10-07 PROCEDURE — G8536 NO DOC ELDER MAL SCRN: HCPCS | Performed by: FAMILY MEDICINE

## 2020-10-07 PROCEDURE — G8510 SCR DEP NEG, NO PLAN REQD: HCPCS | Performed by: FAMILY MEDICINE

## 2020-10-07 PROCEDURE — 99497 ADVNCD CARE PLAN 30 MIN: CPT | Performed by: FAMILY MEDICINE

## 2020-10-07 PROCEDURE — 1101F PT FALLS ASSESS-DOCD LE1/YR: CPT | Performed by: FAMILY MEDICINE

## 2020-10-07 PROCEDURE — G0439 PPPS, SUBSEQ VISIT: HCPCS | Performed by: FAMILY MEDICINE

## 2020-10-07 PROCEDURE — G8400 PT W/DXA NO RESULTS DOC: HCPCS | Performed by: FAMILY MEDICINE

## 2020-10-07 PROCEDURE — G8754 DIAS BP LESS 90: HCPCS | Performed by: FAMILY MEDICINE

## 2020-10-07 PROCEDURE — G8419 CALC BMI OUT NRM PARAM NOF/U: HCPCS | Performed by: FAMILY MEDICINE

## 2020-10-07 PROCEDURE — G8753 SYS BP > OR = 140: HCPCS | Performed by: FAMILY MEDICINE

## 2020-10-07 PROCEDURE — 90694 VACC AIIV4 NO PRSRV 0.5ML IM: CPT | Performed by: FAMILY MEDICINE

## 2020-10-07 RX ORDER — SODIUM BICARBONATE 325 MG/1
325 TABLET ORAL 2 TIMES DAILY
COMMUNITY

## 2020-10-07 NOTE — PROGRESS NOTES
1. Have you been to the ER, urgent care clinic since your last visit? Hospitalized since your last visit? No    2. Have you seen or consulted any other health care providers outside of the [address] [place] [address] since your last visit? Include any pap smears or colon screening. No     Pharmacy verified. [address], [address]    Patient states did not take bp medication today.     Chief Complaint   Patient presents with    Complete Physical    Immunization/Injection     Health Maintenance Due   Topic Date Due    DTaP/Tdap/Td series (1 - Tdap) 11/27/1956    Shingrix Vaccine Age 50> (1 of 2) 11/27/1985    Bone Densitometry (Dexa) Screening  11/27/2000    Medicare Yearly Exam  08/27/2020    Foot Exam Q1  08/27/2020    MICROALBUMIN Q1  08/27/2020    Flu Vaccine (1) 09/01/2020     Visit Vitals  BP (!) 164/76 (BP 1 Location: Left arm, BP Patient Position: Sitting)   Pulse (!) 57   Temp 98.7 °F (37.1 °C) (Oral)   Resp 16   Ht 5' (1.524 m)   Wt 139 lb (63 kg)   SpO2 99%   BMI 27.15 kg/m²

## 2020-10-07 NOTE — ACP (ADVANCE CARE PLANNING)
Advance Care Planning       Advance Care Planning (ACP) Physician/NP/PA (Provider) Conversation        Date of ACP Conversation: 10/7/2020    Conversation Conducted with:   Patient with Decision Making Columba Gudino Maker:    Current Designated Health Care Decision Maker:   (If there is a valid [place] named in the [address]" box in the ACP activity, but it is not visible above, be sure to open that field and then select the health care decision maker relationship (ie \"primary\") in the blank space to the right of the name.)    Note: Assess and validate information in current ACP documents, as indicated. If no Authorized Decision Maker has previously been identified, then patient chooses [place]:  \"Who would you like to name as your primary health care decision-maker? \"    Name:   Fauzia Marquez   Relationship:  Phone number: [phone]  \"Can this person be reached easily? \" YES  \"Who would you like to name as your back-up decision maker? \"   Name: Ulysses Nations rose  Relationship: granddaughter Phone number: [phone]  Riley Au this person be reached easily? \" YES    Note: If the relationship of these Decision-Makers to the patient does NOT follow your state's Next of Kin hierarchy, recommend that patient complete ACP document that meets state-specific requirements to allow them to act on the patient's behalf when appropriate. Care Preferences:    Hospitalization: \"If your health worsens and it becomes clear that your chance of recovery is unlikely, what would your preference be regarding hospitalization? \"  If the patient would want hospitalization, answer \"yes\". If the patient would prefer comfort-focused treatment without hospitalization, answer \"no\". yes      Ventilation:   \"If you were in your present state of health and suddenly became very ill and were unable to breathe on your own, what would your preference be about the use of a ventilator (breathing machine) if it was available to you? \"    If patient would desire the use of a ventilator (breathing machine), answer \"yes\", if not answer \"no\":no    \"If your health worsens and it becomes clear that your chance of recovery is unlikely, what would your preference be about the use of a ventilator (breathing machine) if it was available to you? \"   no      Resuscitation:  \"CPR works best to restart the heart when there is a sudden event, like a heart attack, in someone who is otherwise healthy. Unfortunately, CPR does not typically restart the heart for people who have serious health conditions or who are very sick. \"    \"In the event your heart stopped as a result of an underlying serious health condition, would you want attempts to be made to restart your heart (answer \"yes\" for attempt to resuscitate) or would you prefer a natural death (answer \"no\" for do not attempt to resuscitate)? \"   no    NOTE: If the patient has a valid advance directive AND provides care preference(s) that are inconsistent with that prior directive, advise the patient to consider either: creating a new advance directive that complies with state-specific requirements; or, if that is not possible, orally revoking that prior directive in accordance with state-specific requirements, which must be documented in the EHR.     Conversation Outcomes / Follow-Up Plan:   Completed new Advance Directive      Length of Voluntary ACP Conversation in minutes:  16 minutes      Constance Brown MD

## 2020-10-07 NOTE — PATIENT INSTRUCTIONS
Medicare Wellness Visit, Female The best way to live healthy is to have a lifestyle where you eat a well-balanced diet, exercise regularly, limit alcohol use, and quit all forms of tobacco/nicotine, if applicable. Regular preventive services are another way to keep healthy. Preventive services (vaccines, screening tests, monitoring & exams) can help personalize your care plan, which helps you manage your own care. Screening tests can find health problems at the earliest stages, when they are easiest to treat. [facility] follows the current, evidence-based guidelines published by the [facility] ([facility]) when recommending preventive services for our patients. Because we follow these guidelines, sometimes recommendations change over time as research supports it. (For example, mammograms used to be recommended annually. Even though Medicare will still pay for an annual mammogram, the newer guidelines recommend a mammogram every two years for women of average risk). Of course, you and your doctor may decide to screen more often for some diseases, based on your risk and your co-morbidities (chronic disease you are already diagnosed with). Preventive services for you include: - Medicare offers their members a free annual wellness visit, which is time for you and your primary care provider to discuss and plan for your preventive service needs. Take advantage of this benefit every year! 
-All adults over the age of 72 should receive the recommended pneumonia vaccines. Current USPSTF guidelines recommend a series of two vaccines for the best pneumonia protection.  
-All adults should have a flu vaccine yearly and a tetanus vaccine every 10 years.  
-All adults age 48 and older should receive the shingles vaccines (series of two vaccines).      
-All adults age 38-68 who are overweight should have a diabetes screening test once every three years.  
-All adults born between [birth year] and 1965 should be screened once for Hepatitis C. 
-Other screening tests and preventive services for persons with diabetes include: an eye exam to screen for diabetic retinopathy, a kidney function test, a foot exam, and stricter control over your cholesterol.  
-Cardiovascular screening for adults with routine risk involves an electrocardiogram (ECG) at intervals determined by your doctor.  
-Colorectal cancer screenings should be done for adults age 54-65 with no increased risk factors for colorectal cancer. There are a number of acceptable methods of screening for this type of cancer. Each test has its own benefits and drawbacks. Discuss with your doctor what is most appropriate for you during your annual wellness visit. The different tests include: colonoscopy (considered the best screening method), a fecal occult blood test, a fecal DNA test, and sigmoidoscopy. 
 
-A bone mass density test is recommended when a woman turns 65 to screen for osteoporosis. This test is only recommended one time, as a screening. Some providers will use this same test as a disease monitoring tool if you already have osteoporosis. -Breast cancer screenings are recommended every other year for women of normal risk, age 54-69. 
-Cervical cancer screenings for women over age 72 are only recommended with certain risk factors. Here is a list of your current Health Maintenance items (your personalized list of preventive services) with a due date: 
Health Maintenance Due Topic Date Due  
 DTaP/Tdap/Td  (1 - Tdap) 11/27/1956  Shingles Vaccine (1 of 2) 11/27/1985  Bone Mineral Density   11/27/2000 [facility] Annual Well Visit  08/27/2020 [facility] Diabetic Foot Care  08/27/2020  Albumin Urine Test  08/27/2020  Yearly Flu Vaccine (1) 09/01/2020

## 2020-10-07 NOTE — PROGRESS NOTES
Chief Complaint   Patient presents with    Complete Physical    Immunization/Injection     she is a 80y.o. year old female who presents for evalution. Here for [address]  She has needs that require a separate encounter  She did not take her meds this morning and BP is high  She says is usually better than this, no chest pain or SOB. Doing well  She wants the flu shot today    Reviewed PmHx, RxHx, FmHx, SocHx, AllgHx and updated and dated in the chart. Aspirin yes ____   No____ N/A____    Patient Active Problem List    Diagnosis    CKD (chronic kidney disease) stage 3, GFR 30-59 ml/min ([facility] [address]       Nurse notes were reviewed and copied and are correct  Review of Systems - negative except as listed above in the HPI    Objective:     Vitals:    10/07/20 1456 10/07/20 1508   BP: (!) 164/76 (!) 147/70   Pulse: (!) 57    Resp: 16    Temp: 98.7 °F (37.1 °C)    TempSrc: Oral    SpO2: 99%    Weight: 139 lb (63 kg)    Height: 5' (1.524 m)      Physical Examination: General appearance - alert, well appearing, and in no distress  Mental status - alert, oriented to person, place, and time  Neck - supple, no significant adenopathy  Chest - clear to auscultation, no wheezes, rales or rhonchi, symmetric air entry  Heart - normal rate, regular rhythm, normal S1, S2, no murmurs, rubs, clicks or gallops  Musculoskeletal - no joint tenderness, deformity or swelling  Extremities - peripheral pulses normal, no pedal edema, no clubbing or cyanosis  Skin - normal coloration and turgor, no rashes, no suspicious skin lesions noted         Assessment/ Plan:   Diagnoses and all orders for this visit:          1. Type 2 diabetes mellitus with hyperglycemia, without long-term current use of insulin (HCC)  -     HEMOGLOBIN A1C WITH EAG; Future  -     METABOLIC PANEL, COMPREHENSIVE; Future  -     MICROALBUMIN, UR, RAND W/ MICROALB/CREAT RATIO; Future  Cont to monitor  2. Anemia, unspecified type  -     CBC WITH AUTOMATED DIFF;  Future  3 uncontrolled  hypertension   take meds when get loretta, recheck here was much better      ICD-10-CM ICD-9-CM    1. Medicare annual wellness visit, subsequent  Z00.00 V70.0    2. Advanced directives, counseling/discussion  Z71.89 V65.49 ADVANCE CARE PLANNING FIRST 30 MINS   3. Screening for depression  Z13.31 V79.0 DEPRESSION SCREEN ANNUAL   4. Encounter for immunization  Z23 V03.89 ADMIN INFLUENZA VIRUS VAC   5. Type 2 diabetes mellitus with hyperglycemia, without long-term current use of insulin (HCC)  E11.65 250.00 HEMOGLOBIN A1C WITH EAG     301.75 METABOLIC PANEL, COMPREHENSIVE      MICROALBUMIN, UR, RAND W/ MICROALB/CREAT RATIO   6. Anemia, unspecified type  D64.9 285.9 CBC WITH AUTOMATED DIFF       I have discussed the diagnosis with the patient and the intended plan as seen in the above orders. The patient has received an after-visit summary and questions were answered concerning future plans. Patient Instructions       Medicare Wellness Visit, Female     The best way to live healthy is to have a lifestyle where you eat a well-balanced diet, exercise regularly, limit alcohol use, and quit all forms of tobacco/nicotine, if applicable. Regular preventive services are another way to keep healthy. Preventive services (vaccines, screening tests, monitoring & exams) can help personalize your care plan, which helps you manage your own care. Screening tests can find health problems at the earliest stages, when they are easiest to treat. [facility] follows the current, evidence-based guidelines published by the [facility] [place] ([facility]) when recommending preventive services for our patients. Because we follow these guidelines, sometimes recommendations change over time as research supports it. (For example, mammograms used to be recommended annually.  Even though Medicare will still pay for an annual mammogram, the newer guidelines recommend a mammogram every two years for women of average risk). Of course, you and your doctor may decide to screen more often for some diseases, based on your risk and your co-morbidities (chronic disease you are already diagnosed with). Preventive services for you include:  - Medicare offers their members a free annual wellness visit, which is time for you and your primary care provider to discuss and plan for your preventive service needs. Take advantage of this benefit every year!  -All adults over the age of 72 should receive the recommended pneumonia vaccines. Current USPSTF guidelines recommend a series of two vaccines for the best pneumonia protection.   -All adults should have a flu vaccine yearly and a tetanus vaccine every 10 years.   -All adults age 48 and older should receive the shingles vaccines (series of two vaccines). -All adults age 38-68 who are overweight should have a diabetes screening test once every three years.   -All adults born between 80 and 1965 should be screened once for Hepatitis C.  -Other screening tests and preventive services for persons with diabetes include: an eye exam to screen for diabetic retinopathy, a kidney function test, a foot exam, and stricter control over your cholesterol.   -Cardiovascular screening for adults with routine risk involves an electrocardiogram (ECG) at intervals determined by your doctor.   -Colorectal cancer screenings should be done for adults age 54-65 with no increased risk factors for colorectal cancer. There are a number of acceptable methods of screening for this type of cancer. Each test has its own benefits and drawbacks. Discuss with your doctor what is most appropriate for you during your annual wellness visit. The different tests include: colonoscopy (considered the best screening method), a fecal occult blood test, a fecal DNA test, and sigmoidoscopy.    -A bone mass density test is recommended when a woman turns 65 to screen for osteoporosis. This test is only recommended one time, as a screening. Some providers will use this same test as a disease monitoring tool if you already have osteoporosis. -Breast cancer screenings are recommended every other year for women of normal risk, age 54-69.  -Cervical cancer screenings for women over age 72 are only recommended with certain risk factors. Here is a list of your current Health Maintenance items (your personalized list of preventive services) with a due date:  Health Maintenance Due   Topic Date Due    DTaP/Tdap/Td  (1 - Tdap) 11/27/1956    Shingles Vaccine (1 of 2) 11/27/1985    Bone Mineral Density   11/27/2000    Annual Well Visit  08/27/2020    Diabetic Foot Care  08/27/2020    Albumin Urine Test  08/27/2020    Yearly Flu Vaccine (1) 09/01/2020             The patient verbalizes understanding and agrees with the plan of care        Patient has the advanced directives booklet to review  This is the Subsequent Medicare Annual Wellness Exam, performed 12 months or more after the Initial AWV or the last Subsequent AWV    I have reviewed the patient's medical history in detail and updated the computerized patient record. History     Patient Active Problem List   Diagnosis Code    CKD (chronic kidney disease) stage 3, GFR 30-59 ml/min ([facility]) N18.30     No past medical history on file. Past Surgical History:   Procedure Laterality Date    HX BREAST BIOPSY       Current Outpatient Medications   Medication Sig Dispense Refill    sodium bicarbonate 325 mg tablet Take 325 mg by mouth two (2) times a day.  ferrous sulfate (IRON) 325 mg (65 mg iron) EC tablet TAKE 1 TABLET TWICE DAILY 180 Tab 0    amLODIPine (NORVASC) 5 mg tablet Take 1 Tab by mouth two (2) times a day. 60 Tab 2    Eliquis 5 mg tablet TAKE 1 TABLET BY MOUTH TWICE DAILY 180 Tab 1    losartan (COZAAR) 100 mg tablet Take 1 Tab by mouth daily. 90 Tab 3    chlorthalidone (HYGROTEN) 25 mg tablet TAKE (1) TABLET DAILY.  30 Tab 3    glyBURIDE (DIABETA) 5 mg tablet Take 1 Tab by mouth Daily (before breakfast). 90 Tab 1    diclofenac (VOLTAREN) 1 % gel Apply 2 g to affected area four (4) times daily. 1 Each 3    carvedilol (COREG) 12.5 mg tablet Take 1.5 Tabs by mouth two (2) times a day.  hydrALAZINE (APRESOLINE) 25 mg tablet Take 1 Tab by mouth two (2) times a day.  amiodarone (CORDARONE) 200 mg tablet Take 200 mg by mouth daily.  magnesium oxide (MAG-OX) 400 mg tablet Take 400 mg by mouth daily.  cetirizine (ZYRTEC) 10 mg tablet TAKE (1) TABLET DAILY. 30 Tab 0    fluticasone propionate (FLONASE) 50 mcg/actuation nasal spray 2 Sprays by Both Nostrils route daily. 1 Bottle 5     No Known Allergies    Family History   Problem Relation Age of Onset    Cancer Mother     Heart Disease Mother      Social History     Tobacco Use    Smoking status: Never Smoker    Smokeless tobacco: Never Used   Substance Use Topics    Alcohol use: No     Frequency: Never       Depression Risk Factor Screening:     3 most recent PHQ Screens 10/7/2020   Little interest or pleasure in doing things Not at all   Feeling down, depressed, irritable, or hopeless Not at all   Total Score PHQ 2 0       Alcohol Risk Screen   Do you average more than 1 drink per night or more than 7 drinks a week:  No    On any one occasion in the past three months have you have had more than 3 drinks containing alcohol:  No        Functional Ability and Level of Safety:   Hearing: Hearing is good. Activities of Daily Living: The home contains: no safety equipment. Patient does total self care     Ambulation: with no difficulty     Fall Risk:  Fall Risk Assessment, last 12 mths 10/7/2020   Able to walk? Yes   Fall in past 12 months?  No     Abuse Screen:  Patient is not abused       Cognitive Screening   Has your family/caregiver stated any concerns about your memory: no     Cognitive Screening: Normal - Clock Drawing Test    Patient Care Team   Patient Care Team:  Abiodun Flanagan MD as PCP - General (Family Medicine)  Abiodun Flanagan MD as PCP - [facility] Empaneled Provider    Assessment/Plan   Education and counseling provided:  Are appropriate based on today's review and evaluation  End-of-Life planning (with patient's consent)  Influenza Vaccine    Diagnoses and all orders for this visit:    1. Medicare annual wellness visit, subsequent    2. Advanced directives, counseling/discussion  -     ADVANCE CARE PLANNING FIRST 30 MINS    3. Screening for depression  -     DEPRESSION SCREEN ANNUAL    4.  Encounter for immunization  -     \[facility]\"" Maintenance Due   Topic Date Due    DTaP/Tdap/Td series (1 - Tdap) 11/27/1956    Shingrix Vaccine Age 50> (1 of 2) 11/27/1985    Bone Densitometry (Dexa) Screening  11/27/2000    Medicare Yearly Exam  08/27/2020    Foot Exam Q1  08/27/2020    MICROALBUMIN Q1  08/27/2020    Flu Vaccine (1) 09/01/2020

## 2020-10-13 PROBLEM — N18.4 CKD (CHRONIC KIDNEY DISEASE) STAGE 4, GFR 15-29 ML/MIN (HCC): Status: ACTIVE | Noted: 2020-10-13

## 2020-10-19 DIAGNOSIS — R60.0 PEDAL EDEMA: ICD-10-CM

## 2020-10-23 RX ORDER — CHLORTHALIDONE 25 MG/1
TABLET ORAL
Qty: 30 TAB | Refills: 0 | Status: SHIPPED | OUTPATIENT
Start: 2020-10-23 | End: 2020-12-19

## 2020-10-26 ENCOUNTER — DOCUMENTATION ONLY (OUTPATIENT)
Dept: FAMILY MEDICINE CLINIC | Age: 85
End: 2020-10-26

## 2020-11-04 DIAGNOSIS — D64.9 ANEMIA, UNSPECIFIED TYPE: ICD-10-CM

## 2020-11-06 LAB
ALBUMIN SERPL-MCNC: 4.1 G/DL (ref 3.6–4.6)
ALBUMIN/GLOB SERPL: 1.3 {RATIO} (ref 1.2–2.2)
ALP SERPL-CCNC: 126 IU/L (ref 39–117)
ALT SERPL-CCNC: 16 IU/L (ref 0–32)
AST SERPL-CCNC: 22 IU/L (ref 0–40)
BASOPHILS # BLD AUTO: 0.1 X10E3/UL (ref 0–0.2)
BASOPHILS NFR BLD AUTO: 1 %
BILIRUB SERPL-MCNC: 0.3 MG/DL (ref 0–1.2)
BUN SERPL-MCNC: 30 MG/DL (ref 8–27)
BUN/CREAT SERPL: 21 (ref 12–28)
CALCIUM SERPL-MCNC: 9.4 MG/DL (ref 8.7–10.3)
CHLORIDE SERPL-SCNC: 105 MMOL/L (ref 96–106)
CHOLEST SERPL-MCNC: 181 MG/DL (ref 100–199)
CO2 SERPL-SCNC: 28 MMOL/L (ref 20–29)
CREAT SERPL-MCNC: 1.46 MG/DL (ref 0.57–1)
EOSINOPHIL # BLD AUTO: 0.2 X10E3/UL (ref 0–0.4)
EOSINOPHIL NFR BLD AUTO: 4 %
ERYTHROCYTE [DISTWIDTH] IN BLOOD BY AUTOMATED COUNT: 12.8 % (ref 11.7–15.4)
EST. AVERAGE GLUCOSE BLD GHB EST-MCNC: 134 MG/DL
GLOBULIN SER CALC-MCNC: 3.1 G/DL (ref 1.5–4.5)
GLUCOSE SERPL-MCNC: 115 MG/DL (ref 65–99)
HBA1C MFR BLD: 6.3 % (ref 4.8–5.6)
HCT VFR BLD AUTO: 33.6 % (ref 34–46.6)
HDLC SERPL-MCNC: 66 MG/DL
HGB BLD-MCNC: 10.4 G/DL (ref 11.1–15.9)
IMM GRANULOCYTES # BLD AUTO: 0 X10E3/UL (ref 0–0.1)
IMM GRANULOCYTES NFR BLD AUTO: 0 %
LDLC SERPL CALC-MCNC: 96 MG/DL (ref 0–99)
LYMPHOCYTES # BLD AUTO: 1.8 X10E3/UL (ref 0.7–3.1)
LYMPHOCYTES NFR BLD AUTO: 38 %
MCH RBC QN AUTO: 24.4 PG (ref 26.6–33)
MCHC RBC AUTO-ENTMCNC: 31 G/DL (ref 31.5–35.7)
MCV RBC AUTO: 79 FL (ref 79–97)
MONOCYTES # BLD AUTO: 0.5 X10E3/UL (ref 0.1–0.9)
MONOCYTES NFR BLD AUTO: 10 %
NEUTROPHILS # BLD AUTO: 2.2 X10E3/UL (ref 1.4–7)
NEUTROPHILS NFR BLD AUTO: 47 %
PLATELET # BLD AUTO: 222 X10E3/UL (ref 150–450)
POTASSIUM SERPL-SCNC: 4.3 MMOL/L (ref 3.5–5.2)
PROT SERPL-MCNC: 7.2 G/DL (ref 6–8.5)
RBC # BLD AUTO: 4.27 X10E6/UL (ref 3.77–5.28)
SODIUM SERPL-SCNC: 145 MMOL/L (ref 134–144)
TRIGL SERPL-MCNC: 105 MG/DL (ref 0–149)
VLDLC SERPL CALC-MCNC: 19 MG/DL (ref 5–40)
WBC # BLD AUTO: 4.7 X10E3/UL (ref 3.4–10.8)

## 2020-11-08 RX ORDER — FERROUS SULFATE 325(65) MG
TABLET, DELAYED RELEASE (ENTERIC COATED) ORAL
Qty: 180 TAB | Refills: 0 | Status: SHIPPED | OUTPATIENT
Start: 2020-11-08

## 2021-06-11 ENCOUNTER — APPOINTMENT (OUTPATIENT)
Dept: CT IMAGING | Age: 86
End: 2021-06-11
Attending: EMERGENCY MEDICINE
Payer: MEDICARE

## 2021-06-11 ENCOUNTER — HOSPITAL ENCOUNTER (EMERGENCY)
Age: 86
Discharge: HOME | End: 2021-06-11
Attending: EMERGENCY MEDICINE
Payer: MEDICARE

## 2021-06-11 VITALS
RESPIRATION RATE: 18 BRPM | BODY MASS INDEX: 23.92 KG/M2 | OXYGEN SATURATION: 99 % | SYSTOLIC BLOOD PRESSURE: 178 MMHG | DIASTOLIC BLOOD PRESSURE: 63 MMHG | WEIGHT: 130 LBS | HEART RATE: 66 BPM | HEIGHT: 62 IN | TEMPERATURE: 98 F

## 2021-06-11 DIAGNOSIS — N20.0 RENAL STONE: Primary | ICD-10-CM

## 2021-06-11 DIAGNOSIS — N39.0 URINARY TRACT INFECTION WITHOUT HEMATURIA, SITE UNSPECIFIED: ICD-10-CM

## 2021-06-11 DIAGNOSIS — M54.9 LEFT-SIDED BACK PAIN, UNSPECIFIED BACK LOCATION, UNSPECIFIED CHRONICITY: ICD-10-CM

## 2021-06-11 LAB
ALBUMIN SERPL-MCNC: 3.9 G/DL (ref 3.5–5)
ALBUMIN/GLOB SERPL: 0.8 {RATIO} (ref 1.1–2.2)
ALP SERPL-CCNC: 100 U/L (ref 45–117)
ALT SERPL-CCNC: 23 U/L (ref 12–78)
ANION GAP SERPL CALC-SCNC: 5 MMOL/L (ref 5–15)
APPEARANCE UR: ABNORMAL
AST SERPL W P-5'-P-CCNC: 27 U/L (ref 15–37)
BACTERIA URNS QL MICRO: ABNORMAL /HPF
BASOPHILS # BLD: 0.1 K/UL (ref 0–0.1)
BASOPHILS NFR BLD: 1 % (ref 0–1)
BILIRUB SERPL-MCNC: 0.5 MG/DL (ref 0.2–1)
BILIRUB UR QL: NEGATIVE
BUN SERPL-MCNC: 28 MG/DL (ref 6–20)
BUN/CREAT SERPL: 18 (ref 12–20)
CA-I BLD-MCNC: 9.8 MG/DL (ref 8.5–10.1)
CHLORIDE SERPL-SCNC: 105 MMOL/L (ref 97–108)
CO2 SERPL-SCNC: 27 MMOL/L (ref 21–32)
COLOR UR: YELLOW
CREAT SERPL-MCNC: 1.58 MG/DL (ref 0.55–1.02)
DIFFERENTIAL METHOD BLD: ABNORMAL
EOSINOPHIL # BLD: 0.1 K/UL (ref 0–0.4)
EOSINOPHIL NFR BLD: 1 % (ref 0–7)
ERYTHROCYTE [DISTWIDTH] IN BLOOD BY AUTOMATED COUNT: 14 % (ref 11.5–14.5)
GLOBULIN SER CALC-MCNC: 4.8 G/DL (ref 2–4)
GLUCOSE SERPL-MCNC: 116 MG/DL (ref 65–100)
GLUCOSE UR STRIP.AUTO-MCNC: NEGATIVE MG/DL
HCT VFR BLD AUTO: 38 % (ref 35–47)
HGB BLD-MCNC: 11.2 G/DL (ref 11.5–16)
HGB UR QL STRIP: ABNORMAL
HYALINE CASTS URNS QL MICRO: ABNORMAL /LPF (ref 0–5)
IMM GRANULOCYTES # BLD AUTO: 0 K/UL (ref 0–0.04)
IMM GRANULOCYTES NFR BLD AUTO: 0 % (ref 0–0.5)
KETONES UR QL STRIP.AUTO: NEGATIVE MG/DL
LEUKOCYTE ESTERASE UR QL STRIP.AUTO: NEGATIVE
LYMPHOCYTES # BLD: 1.8 K/UL (ref 0.8–3.5)
LYMPHOCYTES NFR BLD: 27 % (ref 12–49)
MCH RBC QN AUTO: 23.5 PG (ref 26–34)
MCHC RBC AUTO-ENTMCNC: 29.5 G/DL (ref 30–36.5)
MCV RBC AUTO: 79.7 FL (ref 80–99)
MONOCYTES # BLD: 0.3 K/UL (ref 0–1)
MONOCYTES NFR BLD: 5 % (ref 5–13)
MUCOUS THREADS URNS QL MICRO: ABNORMAL /LPF
NEUTS SEG # BLD: 4.3 K/UL (ref 1.8–8)
NEUTS SEG NFR BLD: 66 % (ref 32–75)
NITRITE UR QL STRIP.AUTO: NEGATIVE
NRBC # BLD: 0 K/UL (ref 0–0.01)
NRBC BLD-RTO: 0 PER 100 WBC
PH UR STRIP: 5 [PH] (ref 5–8)
PLATELET # BLD AUTO: 250 K/UL (ref 150–400)
PMV BLD AUTO: 9.3 FL (ref 8.9–12.9)
POTASSIUM SERPL-SCNC: 4.2 MMOL/L (ref 3.5–5.1)
PROT SERPL-MCNC: 8.7 G/DL (ref 6.4–8.2)
PROT UR STRIP-MCNC: 30 MG/DL
RBC # BLD AUTO: 4.77 M/UL (ref 3.8–5.2)
RBC #/AREA URNS HPF: 135 /HPF (ref 0–5)
SODIUM SERPL-SCNC: 137 MMOL/L (ref 136–145)
SP GR UR REFRACTOMETRY: 1.02 (ref 1–1.03)
SP GR UR REFRACTOMETRY: 1.02 (ref 1–1.03)
UA: UC IF INDICATED,UAUC: ABNORMAL
UROBILINOGEN UR QL STRIP.AUTO: 0.1 EU/DL (ref 0.1–1)
WBC # BLD AUTO: 6.5 K/UL (ref 3.6–11)
WBC URNS QL MICRO: 6 /HPF (ref 0–4)

## 2021-06-11 PROCEDURE — 87147 CULTURE TYPE IMMUNOLOGIC: CPT

## 2021-06-11 PROCEDURE — 74176 CT ABD & PELVIS W/O CONTRAST: CPT

## 2021-06-11 PROCEDURE — 85025 COMPLETE CBC W/AUTO DIFF WBC: CPT

## 2021-06-11 PROCEDURE — 36415 COLL VENOUS BLD VENIPUNCTURE: CPT

## 2021-06-11 PROCEDURE — 80053 COMPREHEN METABOLIC PANEL: CPT

## 2021-06-11 PROCEDURE — 81001 URINALYSIS AUTO W/SCOPE: CPT

## 2021-06-11 PROCEDURE — 74011250637 HC RX REV CODE- 250/637: Performed by: EMERGENCY MEDICINE

## 2021-06-11 PROCEDURE — 99284 EMERGENCY DEPT VISIT MOD MDM: CPT

## 2021-06-11 PROCEDURE — 87086 URINE CULTURE/COLONY COUNT: CPT

## 2021-06-11 RX ORDER — HYDROCODONE BITARTRATE AND ACETAMINOPHEN 5; 325 MG/1; MG/1
1 TABLET ORAL
Qty: 12 TABLET | Refills: 0 | Status: SHIPPED | OUTPATIENT
Start: 2021-06-11 | End: 2021-06-14

## 2021-06-11 RX ORDER — ACETAMINOPHEN 500 MG
1000 TABLET ORAL ONCE
Status: COMPLETED | OUTPATIENT
Start: 2021-06-11 | End: 2021-06-11

## 2021-06-11 RX ORDER — CEPHALEXIN 500 MG/1
500 CAPSULE ORAL 2 TIMES DAILY
Qty: 14 CAPSULE | Refills: 0 | Status: SHIPPED | OUTPATIENT
Start: 2021-06-11 | End: 2021-06-18

## 2021-06-11 RX ADMIN — ACETAMINOPHEN 1000 MG: 500 TABLET ORAL at 14:51

## 2021-06-11 NOTE — ED PROVIDER NOTES
EMERGENCY DEPARTMENT HISTORY AND PHYSICAL EXAM        Date: 6/11/2021  Patient Name: Alex Romero    History of Presenting Illness     Chief Complaint   Patient presents with    Leg Pain       History Provided By: Patient    HPI: Alex Romero, 80 y.o. female with no pertinent who presents with left back pain. States symptoms started about 3 days ago. Pain is severe, located in the left back, and radiates down the left buttocks. Pain does radiate to the left anterior abdomen as well. States that pain is sharp and achy and constant. Pain is worse with movement. Denies any hematuria or dysuria. No other associated symptoms. No fevers, IV drug use history, cancer history, saddle anesthesia, or leg weakness. PCP: Asher Caban MD    Current Outpatient Medications   Medication Sig Dispense Refill    Eliquis 5 mg tablet TAKE ONE TABLET TWICE A DAY. 180 Tab 1    chlorthalidone (HYGROTON) 25 mg tablet TAKE (1) TABLET DAILY. 90 Tab 0    amLODIPine (NORVASC) 5 mg tablet TAKE 1 TABLET BY MOUTH TWICE DAILY 180 Tab 0    ferrous sulfate (IRON) 325 mg (65 mg iron) EC tablet TAKE 1 TABLET TWICE DAILY 180 Tab 0    sodium bicarbonate 325 mg tablet Take 325 mg by mouth two (2) times a day.  cetirizine (ZYRTEC) 10 mg tablet TAKE (1) TABLET DAILY. 30 Tab 0    losartan (COZAAR) 100 mg tablet Take 1 Tab by mouth daily. 90 Tab 3    glyBURIDE (DIABETA) 5 mg tablet Take 1 Tab by mouth Daily (before breakfast). 90 Tab 1    fluticasone propionate (FLONASE) 50 mcg/actuation nasal spray 2 Sprays by Both Nostrils route daily. 1 Bottle 5    diclofenac (VOLTAREN) 1 % gel Apply 2 g to affected area four (4) times daily. 1 Each 3    carvedilol (COREG) 12.5 mg tablet Take 1.5 Tabs by mouth two (2) times a day.  hydrALAZINE (APRESOLINE) 25 mg tablet Take 1 Tab by mouth two (2) times a day.  amiodarone (CORDARONE) 200 mg tablet Take 200 mg by mouth daily.       magnesium oxide (MAG-OX) 400 mg tablet Take 400 mg by mouth daily. Past History     Past Medical History:  History reviewed. No pertinent past medical history. Past Surgical History:  Past Surgical History:   Procedure Laterality Date    HX BREAST BIOPSY         Family History:  Family History   Problem Relation Age of Onset    Cancer Mother     Heart Disease Mother        Social History:  Social History     Tobacco Use    Smoking status: Never Smoker    Smokeless tobacco: Never Used   Substance Use Topics    Alcohol use: No    Drug use: No       Allergies:  No Known Allergies      Review of Systems   Review of Systems   Constitutional: Negative for fever. HENT: Negative for congestion. Eyes: Negative for visual disturbance. Respiratory: Negative for shortness of breath. Cardiovascular: Negative for chest pain. Gastrointestinal: Negative for abdominal pain. Genitourinary: Negative for dysuria. Musculoskeletal: Positive for back pain. Negative for arthralgias. Skin: Negative for rash. Neurological: Negative for headaches. Physical Exam   Constitutional: No acute distress. Well-nourished. Skin: No rash. ENT: No rhinorrhea. No cough. Head is normocephalic and atraumatic. Eye: No proptosis or conjunctival injections. Respiratory: No apparent respiratory distress. Gastrointestinal: Nondistended. No abdominal tenderness. Musculoskeletal: No obvious bony deformities. No midline spinal tenderness. No tenderness to the lumbar region. 5/5 strength in bilateral lower extremities with intact sensation to light touch. Psychiatric: Cooperative. Appropriate mood and affect.     Diagnostic Study Results     Labs -     Recent Results (from the past 24 hour(s))   CBC WITH AUTOMATED DIFF    Collection Time: 06/11/21  2:45 PM   Result Value Ref Range    WBC 6.5 3.6 - 11.0 K/uL    RBC 4.77 3.80 - 5.20 M/uL    HGB 11.2 (L) 11.5 - 16.0 g/dL    HCT 38.0 35.0 - 47.0 %    MCV 79.7 (L) 80.0 - 99.0 FL    MCH 23.5 (L) 26.0 - 34.0 PG    MCHC 29.5 (L) 30.0 - 36.5 g/dL    RDW 14.0 11.5 - 14.5 %    PLATELET 867 471 - 656 K/uL    MPV 9.3 8.9 - 12.9 FL    NRBC 0 0.0  WBC    ABSOLUTE NRBC 0 0.00 - 0.01 K/uL    NEUTROPHILS 66 32 - 75 %    LYMPHOCYTES 27 12 - 49 %    MONOCYTES 5 5 - 13 %    EOSINOPHILS 1 0 - 7 %    BASOPHILS 1 0 - 1 %    IMMATURE GRANULOCYTES 0 0 - 0.5 %    ABS. NEUTROPHILS 4.3 1.8 - 8.0 K/UL    ABS. LYMPHOCYTES 1.8 0.8 - 3.5 K/UL    ABS. MONOCYTES 0.3 0.0 - 1.0 K/UL    ABS. EOSINOPHILS 0.1 0.0 - 0.4 K/UL    ABS. BASOPHILS 0.1 0.0 - 0.1 K/UL    ABS. IMM. GRANS. 0 0.00 - 0.04 K/UL    DF AUTOMATED     METABOLIC PANEL, COMPREHENSIVE    Collection Time: 06/11/21  2:45 PM   Result Value Ref Range    Sodium 137 136 - 145 mmol/L    Potassium 4.2 3.5 - 5.1 mmol/L    Chloride 105 97 - 108 mmol/L    CO2 27 21 - 32 mmol/L    Anion gap 5 5 - 15 mmol/L    Glucose 116 (H) 65 - 100 mg/dL    BUN 28 (H) 6 - 20 mg/dL    Creatinine 1.58 (H) 0.55 - 1.02 mg/dL    BUN/Creatinine ratio 18 12 - 20      GFR est AA 38 (L) >60 ml/min/1.73m2    GFR est non-AA 31 (L) >60 ml/min/1.73m2    Calcium 9.8 8.5 - 10.1 mg/dL    Bilirubin, total 0.5 0.2 - 1.0 mg/dL    AST (SGOT) 27 15 - 37 U/L    ALT (SGPT) 23 12 - 78 U/L    Alk.  phosphatase 100 45 - 117 U/L    Protein, total 8.7 (H) 6.4 - 8.2 g/dL    Albumin 3.9 3.5 - 5.0 g/dL    Globulin 4.8 (H) 2.0 - 4.0 g/dL    A-G Ratio 0.8 (L) 1.1 - 2.2     URINALYSIS W/ REFLEX CULTURE    Collection Time: 06/11/21  2:45 PM    Specimen: Urine   Result Value Ref Range    Color Yellow      Appearance TURBID Clear    Specific gravity 1.019 1.003 - 1.030      Specific gravity 1.019 1.003 - 1.030      pH (UA) 5.0 5.0 - 8.0      Protein 30 (A) Negative mg/dL    Glucose Negative Negative mg/dL    Ketone Negative Negative mg/dL    Bilirubin Negative Negative      Blood Moderate (A) Negative      Urobilinogen 0.1 0.1 - 1.0 EU/dL    Nitrites Negative Negative      Leukocyte Esterase Negative Negative      WBC 6 (H) 0 - 4 /hpf     (H) 0 - 5 /hpf    Bacteria Rare (A) Negative /hpf    UA:UC IF INDICATED Urine Culture Ordered (A) Culture not indicated by UA result      Mucus Trace (A) Negative /lpf    Hyaline cast 2-5 0 - 5 /lpf       Radiologic Studies -   CT ABD PELV WO CONT   Final Result   Left renal calculus without hydronephrosis. Thoracic location of   the stomach. Colonic diverticulosis without acute diverticulitis. Other   findings as above. CT Results  (Last 48 hours)               06/11/21 1517  CT ABD PELV WO CONT Final result    Impression:  Left renal calculus without hydronephrosis. Thoracic location of   the stomach. Colonic diverticulosis without acute diverticulitis. Other   findings as above. Narrative:  CT abdomen and pelvis, 6/11/2021       History: Left lower quadrant pain. Technique: No oral or intravenous contrast was administered. Multiple   contiguous axial images were obtained from the diaphragm to the inferior pubic   rami. Coronal and sagittal reconstruction of images was made. All CT scans at this facility are performed using dose reduction optimization   techniques as appropriate to perform the exam including the following: Automated   exposure control, adjustments of the mA and/or kV according to patient size, or   use iterative reconstruction technique. Comparison:  None. Findings: The included lung bases has minimal atelectasis. There is a focal asymmetry in the included right breast, inferior aspect. Mammographic correlation is recommended. Skin induration and subcutaneous stranding are seen in the right abdominal wall. There is a 1.7 cm subcutaneous density in the left lateral abdominal wall just   above the iliac crest, nonspecific. The liver, gallbladder, spleen, adrenal glands, pancreas demonstrate no focal   abnormality on this noncontrast enhanced study. The right kidney has no calculi. The left kidney has a 9 mm calculus.   No hydronephrosis is present. No ureteral dilatation or intraureteral calculus is   identified. Fluid attenuation cortical hypodensities in the kidneys consistent with cysts   measure 2.8 cm on the right and 2.3 cm on the left. No ventral wall hernia is identified. Intrathoracic location of the stomach is noted. There is no evidence of   mechanical bowel obstruction. The terminal ileum has no focal abnormality. The   appendix is not definitively identified. No pericecal inflammatory changes are   present. There is colonic diverticulosis without acute diverticulitis. The bladder has no focal wall thickening or intramural calculus. The uterus is   absent. No focal adnexal abnormality is identified. The abdominal aorta is normal in caliber and has atherosclerotic plaque. No free air or free fluid is seen. Degenerative changes are present in the spine. There is 5 mm anterolisthesis of   L4 on L5, likely related to degenerative changes. Mild degenerative changes are   also noted in the hips. CXR Results  (Last 48 hours)    None          Medical Decision Making and ED Course     I reviewed the available vital signs, nursing notes, past medical history, past surgical history, family history, and social history. Vital Signs - Reviewed the patient's vital signs. Patient Vitals for the past 12 hrs:   Temp Pulse Resp BP SpO2   06/11/21 1836  66 18 (!) 178/63 99 %   06/11/21 1800  60 18 (!) 176/71 99 %   06/11/21 1732  66 19 (!) 175/65 99 %   06/11/21 1702  62 19 (!) 174/60 100 %   06/11/21 1655     100 %   06/11/21 1630  60 17 (!) 171/66 100 %   06/11/21 1600  63 18 (!) 176/65 100 %   06/11/21 1332 98 °F (36.7 °C) 62 16 (!) 172/64 100 %         Medical Decision Making:   Presented with left low back pain radiating to the abdomen.  The differential diagnosis is musculoskeletal back pain, herniated disc, sciatica, arthritis, muscle strain, kidney stone, pyelonephritis. Work-up shows renal stone however does not appear to be obstructing. This could however be causing renal colic. Back pain is possible as well. I recommended Tylenol and rest.  I recommended following up with a urologist as well in case other intervention is needed. Discharged in good condition. No red flag signs that would warrant MRI. May be a possible UTI based on urine. Given scenario will prescribe Keflex. Also prescribed Norco for short duration. Disposition     Discharged      DISCHARGE PLAN:  1. Current Discharge Medication List      CONTINUE these medications which have NOT CHANGED    Details   Eliquis 5 mg tablet TAKE ONE TABLET TWICE A DAY. Qty: 180 Tab, Refills: 1      chlorthalidone (HYGROTON) 25 mg tablet TAKE (1) TABLET DAILY. Qty: 90 Tab, Refills: 0    Associated Diagnoses: Pedal edema      amLODIPine (NORVASC) 5 mg tablet TAKE 1 TABLET BY MOUTH TWICE DAILY  Qty: 180 Tab, Refills: 0      ferrous sulfate (IRON) 325 mg (65 mg iron) EC tablet TAKE 1 TABLET TWICE DAILY  Qty: 180 Tab, Refills: 0    Associated Diagnoses: Anemia, unspecified type      sodium bicarbonate 325 mg tablet Take 325 mg by mouth two (2) times a day. cetirizine (ZYRTEC) 10 mg tablet TAKE (1) TABLET DAILY. Qty: 30 Tab, Refills: 0    Associated Diagnoses: Tinnitus of both ears      losartan (COZAAR) 100 mg tablet Take 1 Tab by mouth daily. Qty: 90 Tab, Refills: 3      glyBURIDE (DIABETA) 5 mg tablet Take 1 Tab by mouth Daily (before breakfast). Qty: 90 Tab, Refills: 1      fluticasone propionate (FLONASE) 50 mcg/actuation nasal spray 2 Sprays by Both Nostrils route daily. Qty: 1 Bottle, Refills: 5    Associated Diagnoses: Tinnitus of both ears      diclofenac (VOLTAREN) 1 % gel Apply 2 g to affected area four (4) times daily.   Qty: 1 Each, Refills: 3    Associated Diagnoses: Chronic pain of right knee      carvedilol (COREG) 12.5 mg tablet Take 1.5 Tabs by mouth two (2) times a day.      hydrALAZINE (APRESOLINE) 25 mg tablet Take 1 Tab by mouth two (2) times a day. amiodarone (CORDARONE) 200 mg tablet Take 200 mg by mouth daily. magnesium oxide (MAG-OX) 400 mg tablet Take 400 mg by mouth daily. 2.   Follow-up Information     Follow up With Specialties Details Why [address] EMERGENCY DEPT Emergency Medicine Go today As soon as possible if symptoms worsen Marcelino Taveras 29  Mikal Hu MD Urology Schedule an appointment as soon as possible for a visit  this is the urologist. call for appointment [address] [facility] [address]  [phone]          3. Return to ED if worse     Diagnosis     Clinical impression:   1. Renal stone    2. Urinary tract infection without hematuria, site unspecified    3. Left-sided back pain, unspecified back location, unspecified chronicity           Attestation:  Please note that this dictation was completed with [employer], the computer voice recognition software. Quite often unanticipated grammatical, syntax, homophones, and other interpretive errors are inadvertently transcribed by the computer software. Please disregard these errors. Please excuse any errors that have escaped final proofreading. Thank you.   [facility], DO

## 2021-06-13 LAB
BACTERIA SPEC CULT: ABNORMAL
COLONY COUNT,CNT: ABNORMAL
COLONY COUNT,CNT: ABNORMAL
SPECIAL REQUESTS,SREQ: ABNORMAL

## 2021-06-28 RX ORDER — GLYBURIDE 5 MG/1
TABLET ORAL
Qty: 90 TABLET | Refills: 0 | Status: SHIPPED | OUTPATIENT
Start: 2021-06-28 | End: 2022-07-11

## 2021-07-21 ENCOUNTER — TELEPHONE (OUTPATIENT)
Dept: FAMILY MEDICINE CLINIC | Age: 86
End: 2021-07-21

## 2021-07-21 NOTE — TELEPHONE ENCOUNTER
----- Message from [facility] sent at 7/21/2021 12:32 PM EDT -----  Regarding: Dr. [facility]: [phone]  Medication Refill    Caller (if not patient): Valencia Scott       Relationship of caller (if not patient): Daughter      Best contact number(s): [phone]      Name of medication and dosage if known: chlorthalidone (HYGROTON) 25 mg tablet       Is patient out of this medication (yes/no): yes      Pharmacy name: [facility] listed in chart? (yes/no): yes  Pharmacy phone number: [phone]      Details to clarify the request: N/A      [facility]

## 2021-07-22 NOTE — TELEPHONE ENCOUNTER
Two patient Identification confirmed. Please call to schedule complete physical office visit     Per Dr. Liliya Bains please schedule [id number] Pastora Johns (Daughter) complete physical around the same time. Pastora Johns is help mother with transportation.     Thank you

## 2021-09-30 ENCOUNTER — OFFICE VISIT (OUTPATIENT)
Dept: FAMILY MEDICINE CLINIC | Age: 86
End: 2021-09-30
Payer: MEDICARE

## 2021-09-30 VITALS
BODY MASS INDEX: 24.66 KG/M2 | RESPIRATION RATE: 16 BRPM | HEIGHT: 62 IN | WEIGHT: 134 LBS | SYSTOLIC BLOOD PRESSURE: 162 MMHG | TEMPERATURE: 97.2 F | DIASTOLIC BLOOD PRESSURE: 76 MMHG | HEART RATE: 56 BPM | OXYGEN SATURATION: 96 %

## 2021-09-30 DIAGNOSIS — D63.8 ANEMIA, CHRONIC DISEASE: ICD-10-CM

## 2021-09-30 DIAGNOSIS — N18.4 CKD (CHRONIC KIDNEY DISEASE) STAGE 4, GFR 15-29 ML/MIN (HCC): ICD-10-CM

## 2021-09-30 DIAGNOSIS — Z23 ENCOUNTER FOR IMMUNIZATION: ICD-10-CM

## 2021-09-30 DIAGNOSIS — M54.50 ACUTE BILATERAL LOW BACK PAIN WITHOUT SCIATICA: ICD-10-CM

## 2021-09-30 DIAGNOSIS — E11.65 TYPE 2 DIABETES MELLITUS WITH HYPERGLYCEMIA, WITHOUT LONG-TERM CURRENT USE OF INSULIN (HCC): ICD-10-CM

## 2021-09-30 DIAGNOSIS — I50.9 OTHER CONGESTIVE HEART FAILURE (HCC): ICD-10-CM

## 2021-09-30 DIAGNOSIS — I10 UNCONTROLLED HYPERTENSION: Primary | ICD-10-CM

## 2021-09-30 LAB
ALBUMIN SERPL-MCNC: 3.6 G/DL (ref 3.5–5)
ALBUMIN/GLOB SERPL: 0.9 {RATIO} (ref 1.1–2.2)
ALP SERPL-CCNC: 113 U/L (ref 45–117)
ALT SERPL-CCNC: 21 U/L (ref 12–78)
ANION GAP SERPL CALC-SCNC: 5 MMOL/L (ref 5–15)
AST SERPL-CCNC: 22 U/L (ref 15–37)
BASOPHILS # BLD: 0.1 K/UL (ref 0–0.1)
BASOPHILS NFR BLD: 1 % (ref 0–1)
BILIRUB SERPL-MCNC: 0.5 MG/DL (ref 0.2–1)
BUN SERPL-MCNC: 33 MG/DL (ref 6–20)
BUN/CREAT SERPL: 23 (ref 12–20)
CALCIUM SERPL-MCNC: 9.8 MG/DL (ref 8.5–10.1)
CHLORIDE SERPL-SCNC: 104 MMOL/L (ref 97–108)
CO2 SERPL-SCNC: 31 MMOL/L (ref 21–32)
CREAT SERPL-MCNC: 1.41 MG/DL (ref 0.55–1.02)
DIFFERENTIAL METHOD BLD: ABNORMAL
EOSINOPHIL # BLD: 0.2 K/UL (ref 0–0.4)
EOSINOPHIL NFR BLD: 4 % (ref 0–7)
ERYTHROCYTE [DISTWIDTH] IN BLOOD BY AUTOMATED COUNT: 13.4 % (ref 11.5–14.5)
EST. AVERAGE GLUCOSE BLD GHB EST-MCNC: 137 MG/DL
GLOBULIN SER CALC-MCNC: 3.9 G/DL (ref 2–4)
GLUCOSE SERPL-MCNC: 138 MG/DL (ref 65–100)
HBA1C MFR BLD: 6.4 % (ref 4–5.6)
HCT VFR BLD AUTO: 35 % (ref 35–47)
HGB BLD-MCNC: 10.4 G/DL (ref 11.5–16)
IMM GRANULOCYTES # BLD AUTO: 0 K/UL (ref 0–0.04)
IMM GRANULOCYTES NFR BLD AUTO: 0 % (ref 0–0.5)
LYMPHOCYTES # BLD: 2.7 K/UL (ref 0.8–3.5)
LYMPHOCYTES NFR BLD: 45 % (ref 12–49)
MCH RBC QN AUTO: 23.7 PG (ref 26–34)
MCHC RBC AUTO-ENTMCNC: 29.7 G/DL (ref 30–36.5)
MCV RBC AUTO: 79.9 FL (ref 80–99)
MONOCYTES # BLD: 0.5 K/UL (ref 0–1)
MONOCYTES NFR BLD: 9 % (ref 5–13)
NEUTS SEG # BLD: 2.4 K/UL (ref 1.8–8)
NEUTS SEG NFR BLD: 41 % (ref 32–75)
NRBC # BLD: 0 K/UL (ref 0–0.01)
NRBC BLD-RTO: 0 PER 100 WBC
PLATELET # BLD AUTO: 230 K/UL (ref 150–400)
PMV BLD AUTO: 10.8 FL (ref 8.9–12.9)
POTASSIUM SERPL-SCNC: 4.3 MMOL/L (ref 3.5–5.1)
PROT SERPL-MCNC: 7.5 G/DL (ref 6.4–8.2)
RBC # BLD AUTO: 4.38 M/UL (ref 3.8–5.2)
SODIUM SERPL-SCNC: 140 MMOL/L (ref 136–145)
WBC # BLD AUTO: 5.9 K/UL (ref 3.6–11)

## 2021-09-30 PROCEDURE — G8536 NO DOC ELDER MAL SCRN: HCPCS | Performed by: FAMILY MEDICINE

## 2021-09-30 PROCEDURE — G8400 PT W/DXA NO RESULTS DOC: HCPCS | Performed by: FAMILY MEDICINE

## 2021-09-30 PROCEDURE — G8427 DOCREV CUR MEDS BY ELIG CLIN: HCPCS | Performed by: FAMILY MEDICINE

## 2021-09-30 PROCEDURE — 90694 VACC AIIV4 NO PRSRV 0.5ML IM: CPT | Performed by: FAMILY MEDICINE

## 2021-09-30 PROCEDURE — G8420 CALC BMI NORM PARAMETERS: HCPCS | Performed by: FAMILY MEDICINE

## 2021-09-30 PROCEDURE — G0008 ADMIN INFLUENZA VIRUS VAC: HCPCS | Performed by: FAMILY MEDICINE

## 2021-09-30 PROCEDURE — 1101F PT FALLS ASSESS-DOCD LE1/YR: CPT | Performed by: FAMILY MEDICINE

## 2021-09-30 PROCEDURE — G8510 SCR DEP NEG, NO PLAN REQD: HCPCS | Performed by: FAMILY MEDICINE

## 2021-09-30 PROCEDURE — 1090F PRES/ABSN URINE INCON ASSESS: CPT | Performed by: FAMILY MEDICINE

## 2021-09-30 PROCEDURE — 99214 OFFICE O/P EST MOD 30 MIN: CPT | Performed by: FAMILY MEDICINE

## 2021-09-30 NOTE — PROGRESS NOTES
Chief Complaint   Patient presents with    Complete Physical    Immunization/Injection     she is a 80y.o. year old female who presents for evalution. She is here to get flu vaccine  Also wants physical  Her BP is not controlled  She has not taken her meds  She has low back pain bilaterally   uses icy hot patches prn and they help  Went to ER for back pain      Reviewed PmHx, RxHx, FmHx, SocHx, AllgHx and updated and dated in the chart. Aspirin yes ____   No____ N/A____    Patient Active Problem List    Diagnosis    CKD (chronic kidney disease) stage 4, GFR 15-29 ml/min ([facility])    CKD (chronic kidney disease) stage 3, GFR 30-59 ml/min [facility]       Nurse notes were reviewed and copied and are correct  Review of Systems - negative except as listed above in the HPI    Objective:     Vitals:    09/30/21 1357 09/30/21 1410   BP: (!) 170/68 (!) 162/76   Pulse: (!) 56    Resp: 16    Temp: 97.2 °F (36.2 °C)    TempSrc: Skin    SpO2: 96%    Weight: 134 lb (60.8 kg)    Height: 5' 2\" (1.575 m)      Physical Examination: General appearance - alert, well appearing, and in no distress  Mental status - alert, oriented to person, place, and time  Neck - supple, no significant adenopathy  Heart - normal rate, regular rhythm, normal S1, S2, no murmurs, rubs, clicks or gallops  Abdomen - soft, nontender, nondistended, no masses or organomegaly  Neurological - alert, oriented, normal speech, no focal findings or movement disorder noted  Musculoskeletal - no joint tenderness, deformity or swelling  Extremities - peripheral pulses normal, no pedal edema, no clubbing or cyanosis  Skin - normal coloration and turgor, no rashes, no suspicious skin lesions noted         Assessment/ Plan:   Diagnoses and all orders for this visit:    1. Uncontrolled hypertension  -     METABOLIC PANEL, COMPREHENSIVE; Future  She has not taken her bp meds    2. Encounter for immunization  -     INFLUENZA, HIGH DOSE SEASONAL    3.  Acute bilateral low back pain without sciatica  -     XR SPINE LUMB 2 OR 3 V; Future  Check xray for any type of fracture  4. Other congestive heart failure (HCC)  stable  5. Type 2 diabetes mellitus with hyperglycemia, without long-term current use of insulin ([facility])  -     HEMOGLOBIN A1C WITH EAG; Future    6. CKD (chronic kidney disease) stage 4, GFR 15-29 ml/min ([facility])  -     METABOLIC PANEL, COMPREHENSIVE; Future  -     CBC WITH AUTOMATED DIFF; Future    7. Anemia, chronic disease  -     CBC WITH AUTOMATED DIFF; Future       Follow-up and Dispositions    · Return in about 1 month (around 10/30/2021) for recheck bp. ICD-10-CM ICD-9-CM    1. Uncontrolled hypertension  J02 820.8 METABOLIC PANEL, COMPREHENSIVE   2. Encounter for immunization  Z23 V03.89 INFLUENZA, HIGH DOSE SEASONAL   3. Acute bilateral low back pain without sciatica  M54.5 724.2 XR SPINE LUMB 2 OR 3 V     338.19    4. Other congestive heart failure (HCC)  I50.9 428.0    5. Type 2 diabetes mellitus with hyperglycemia, without long-term current use of insulin ([facility])  E11.65 250.00 HEMOGLOBIN A1C WITH EAG     790.29    6. CKD (chronic kidney disease) stage 4, GFR 15-29 ml/min ([facility])  C59.2 580.9 METABOLIC PANEL, COMPREHENSIVE      CBC WITH AUTOMATED DIFF   7. Anemia, chronic disease  D63.8 285.29 CBC WITH AUTOMATED DIFF       I have discussed the diagnosis with the patient and the intended plan as seen in the above orders. The patient has received an after-visit summary and questions were answered concerning future plans. There are no Patient Instructions on file for this visit.     The patient verbalizes understanding and agrees with the plan of care

## 2021-09-30 NOTE — PROGRESS NOTES
1. Have you been to the ER, urgent care clinic since your last visit? Hospitalized since your last visit? 5/2021 [facility] [place]    2. Have you seen or consulted any other health care providers outside of the [address] [place] [address] since your last visit? Include any pap smears or colon screening. No    Chief Complaint   Patient presents with    Complete Physical    Immunization/Injection     Health Maintenance Due   Topic Date Due    DTaP/Tdap/Td series (1 - Tdap) Never done    Shingrix Vaccine Age 50> (1 of 2) Never done    Bone Densitometry (Dexa) Screening  Never done    Foot Exam Q1  08/27/2020    Flu Vaccine (1) 09/01/2021    MICROALBUMIN Q1  10/07/2021    Medicare Yearly Exam  10/08/2021     3 most recent PHQ Screens 9/30/2021   Little interest or pleasure in doing things Several days   Feeling down, depressed, irritable, or hopeless Several days   Total Score PHQ 2 2     Abuse Screening Questionnaire 9/30/2021   Do you ever feel afraid of your partner? N   Are you in a relationship with someone who physically or mentally threatens you? N   Is it safe for you to go home? Y     Visit Vitals  BP (!) 162/76 (BP 1 Location: Left arm, BP Patient Position: Sitting, BP Cuff Size: Adult)   Pulse (!) 56   Temp 97.2 °F (36.2 °C) (Skin)   Resp 16   Ht 5' 2\" (1.575 m)   Wt 134 lb (60.8 kg)   SpO2 96%   BMI 24.51 kg/m²     Fall Risk Assessment, last 12 mths 9/30/2021   Able to walk? Yes   Fall in past 12 months? 1   Do you feel unsteady? 0   Are you worried about falling 0   Is TUG test greater than 12 seconds? 0   Is the gait abnormal? 0   Number of falls in past 12 months 1   Fall with injury?  0

## 2021-10-04 NOTE — PROGRESS NOTES
The hemoglobin is still a little low but is stable. No changes are needed  The kidney function is abnormal but stable  The liver test iis normal  The blood sugar is still in the prediabetes range. This is good control.  Continue the same medication

## 2021-10-05 ENCOUNTER — TELEPHONE (OUTPATIENT)
Dept: FAMILY MEDICINE CLINIC | Age: 86
End: 2021-10-05

## 2021-10-05 NOTE — TELEPHONE ENCOUNTER
Two patient Identification confirmed. Patient notified The hemoglobin is still a little low but is stable. No changes are needed   The kidney function is abnormal but stable   The liver test iis normal   The blood sugar is still in the prediabetes range. This is good control. Continue the same medication   Patient verbalized understanding.

## 2022-01-11 DIAGNOSIS — R60.0 PEDAL EDEMA: ICD-10-CM

## 2022-01-16 RX ORDER — CHLORTHALIDONE 25 MG/1
TABLET ORAL
Qty: 90 TABLET | Refills: 0 | Status: SHIPPED | OUTPATIENT
Start: 2022-01-16 | End: 2022-07-11

## 2022-03-19 PROBLEM — N18.30 CKD (CHRONIC KIDNEY DISEASE) STAGE 3, GFR 30-59 ML/MIN (HCC): Status: ACTIVE | Noted: 2020-10-07

## 2022-03-20 PROBLEM — N18.4 CKD (CHRONIC KIDNEY DISEASE) STAGE 4, GFR 15-29 ML/MIN (HCC): Status: ACTIVE | Noted: 2020-10-13

## 2022-05-19 ENCOUNTER — TELEPHONE (OUTPATIENT)
Dept: FAMILY MEDICINE CLINIC | Age: 87
End: 2022-05-19

## 2022-05-19 NOTE — TELEPHONE ENCOUNTER
LVM     QUESTIONS  Information for Provider? PT daughter called in Erievillechaitanya Carroll in regards to   her appointment in july please call     Preferred Call Back Phone Number?  [phone]

## 2022-05-24 NOTE — PROGRESS NOTES
\" This addendum has been created to correct a medical record clerical error, wherein an erroneous  was selected for your administered flu vaccine . \"

## 2022-10-06 ENCOUNTER — OFFICE VISIT (OUTPATIENT)
Dept: FAMILY MEDICINE CLINIC | Age: 87
End: 2022-10-06

## 2022-10-06 VITALS
DIASTOLIC BLOOD PRESSURE: 87 MMHG | WEIGHT: 136 LBS | OXYGEN SATURATION: 99 % | HEIGHT: 62 IN | SYSTOLIC BLOOD PRESSURE: 156 MMHG | HEART RATE: 77 BPM | BODY MASS INDEX: 25.03 KG/M2 | TEMPERATURE: 98.7 F | RESPIRATION RATE: 16 BRPM

## 2022-10-06 DIAGNOSIS — N18.4 CKD (CHRONIC KIDNEY DISEASE) STAGE 4, GFR 15-29 ML/MIN (HCC): ICD-10-CM

## 2022-10-06 DIAGNOSIS — I10 UNCONTROLLED HYPERTENSION: Primary | ICD-10-CM

## 2022-10-06 DIAGNOSIS — R41.3 MEMORY PROBLEM: ICD-10-CM

## 2022-10-06 DIAGNOSIS — E11.65 TYPE 2 DIABETES MELLITUS WITH HYPERGLYCEMIA, WITHOUT LONG-TERM CURRENT USE OF INSULIN (HCC): ICD-10-CM

## 2022-10-06 DIAGNOSIS — Z23 ENCOUNTER FOR IMMUNIZATION: ICD-10-CM

## 2022-10-06 DIAGNOSIS — I50.9 OTHER CONGESTIVE HEART FAILURE (HCC): ICD-10-CM

## 2022-10-06 DIAGNOSIS — R43.2 LOSS OF TASTE: ICD-10-CM

## 2022-10-06 DIAGNOSIS — D63.8 ANEMIA, CHRONIC DISEASE: ICD-10-CM

## 2022-10-06 LAB
ALBUMIN SERPL-MCNC: 3.8 G/DL (ref 3.5–5)
ALBUMIN/GLOB SERPL: 1.1 {RATIO} (ref 1.1–2.2)
ALP SERPL-CCNC: 115 U/L (ref 45–117)
ALT SERPL-CCNC: 25 U/L (ref 12–78)
ANION GAP SERPL CALC-SCNC: 5 MMOL/L (ref 5–15)
AST SERPL-CCNC: 23 U/L (ref 15–37)
BASOPHILS # BLD: 0.1 K/UL (ref 0–0.1)
BASOPHILS NFR BLD: 1 % (ref 0–1)
BILIRUB SERPL-MCNC: 0.4 MG/DL (ref 0.2–1)
BUN SERPL-MCNC: 28 MG/DL (ref 6–20)
BUN/CREAT SERPL: 19 (ref 12–20)
CALCIUM SERPL-MCNC: 9.4 MG/DL (ref 8.5–10.1)
CHLORIDE SERPL-SCNC: 107 MMOL/L (ref 97–108)
CO2 SERPL-SCNC: 31 MMOL/L (ref 21–32)
CREAT SERPL-MCNC: 1.45 MG/DL (ref 0.55–1.02)
DIFFERENTIAL METHOD BLD: ABNORMAL
EOSINOPHIL # BLD: 0.2 K/UL (ref 0–0.4)
EOSINOPHIL NFR BLD: 3 % (ref 0–7)
ERYTHROCYTE [DISTWIDTH] IN BLOOD BY AUTOMATED COUNT: 13.3 % (ref 11.5–14.5)
EST. AVERAGE GLUCOSE BLD GHB EST-MCNC: 146 MG/DL
GLOBULIN SER CALC-MCNC: 3.5 G/DL (ref 2–4)
GLUCOSE SERPL-MCNC: 149 MG/DL (ref 65–100)
HBA1C MFR BLD: 6.7 % (ref 4–5.6)
HCT VFR BLD AUTO: 36.8 % (ref 35–47)
HGB BLD-MCNC: 10.8 G/DL (ref 11.5–16)
IMM GRANULOCYTES # BLD AUTO: 0 K/UL (ref 0–0.04)
IMM GRANULOCYTES NFR BLD AUTO: 0 % (ref 0–0.5)
LYMPHOCYTES # BLD: 2 K/UL (ref 0.8–3.5)
LYMPHOCYTES NFR BLD: 36 % (ref 12–49)
MCH RBC QN AUTO: 23.6 PG (ref 26–34)
MCHC RBC AUTO-ENTMCNC: 29.3 G/DL (ref 30–36.5)
MCV RBC AUTO: 80.5 FL (ref 80–99)
MONOCYTES # BLD: 0.6 K/UL (ref 0–1)
MONOCYTES NFR BLD: 11 % (ref 5–13)
NEUTS SEG # BLD: 2.7 K/UL (ref 1.8–8)
NEUTS SEG NFR BLD: 49 % (ref 32–75)
NRBC # BLD: 0 K/UL (ref 0–0.01)
NRBC BLD-RTO: 0 PER 100 WBC
PLATELET # BLD AUTO: 222 K/UL (ref 150–400)
PMV BLD AUTO: 10.7 FL (ref 8.9–12.9)
POTASSIUM SERPL-SCNC: 3.9 MMOL/L (ref 3.5–5.1)
PROT SERPL-MCNC: 7.3 G/DL (ref 6.4–8.2)
RBC # BLD AUTO: 4.57 M/UL (ref 3.8–5.2)
SODIUM SERPL-SCNC: 143 MMOL/L (ref 136–145)
WBC # BLD AUTO: 5.6 K/UL (ref 3.6–11)

## 2022-10-06 PROCEDURE — G0008 ADMIN INFLUENZA VIRUS VAC: HCPCS | Performed by: FAMILY MEDICINE

## 2022-10-06 PROCEDURE — 1101F PT FALLS ASSESS-DOCD LE1/YR: CPT | Performed by: FAMILY MEDICINE

## 2022-10-06 PROCEDURE — G8510 SCR DEP NEG, NO PLAN REQD: HCPCS | Performed by: FAMILY MEDICINE

## 2022-10-06 PROCEDURE — 99214 OFFICE O/P EST MOD 30 MIN: CPT | Performed by: FAMILY MEDICINE

## 2022-10-06 PROCEDURE — 3044F HG A1C LEVEL LT 7.0%: CPT | Performed by: FAMILY MEDICINE

## 2022-10-06 PROCEDURE — 1123F ACP DISCUSS/DSCN MKR DOCD: CPT | Performed by: FAMILY MEDICINE

## 2022-10-06 PROCEDURE — 1090F PRES/ABSN URINE INCON ASSESS: CPT | Performed by: FAMILY MEDICINE

## 2022-10-06 PROCEDURE — G8536 NO DOC ELDER MAL SCRN: HCPCS | Performed by: FAMILY MEDICINE

## 2022-10-06 PROCEDURE — G8420 CALC BMI NORM PARAMETERS: HCPCS | Performed by: FAMILY MEDICINE

## 2022-10-06 PROCEDURE — 90662 IIV NO PRSV INCREASED AG IM: CPT | Performed by: FAMILY MEDICINE

## 2022-10-06 PROCEDURE — G8427 DOCREV CUR MEDS BY ELIG CLIN: HCPCS | Performed by: FAMILY MEDICINE

## 2022-10-06 RX ORDER — ASPIRIN 81 MG/1
81 TABLET ORAL DAILY
Qty: 90 TABLET | Refills: 2 | Status: SHIPPED | OUTPATIENT
Start: 2022-10-06

## 2022-10-06 NOTE — PROGRESS NOTES
Chief Complaint   Patient presents with    Annual Wellness Visit     she is a 80y.o. year old female who presents for evalution. She has not been seen in a year    Her complaint today of pain in left lower back  She uses tylenol ans icy hot ptch which controls it    She also c/o loss of taste for more than a year. She can only taste sweets and salt  She does not recall anything like stroke signs  She also has ringing in her ears  Her daughter says she has memory problems. They no longer allow her to cook except a microwave    Reviewed PmHx, RxHx, FmHx, SocHx, AllgHx and updated and dated in the chart.     Aspirin yes ____   No____ N/A____    Patient Active Problem List    Diagnosis    CKD (chronic kidney disease) stage 4, GFR 15-29 ml/min ([facility])    CKD (chronic kidney disease) stage 3, GFR 30-59 ml/min ([facility] [address]       Nurse notes were reviewed and copied and are correct  Review of Systems - negative except as listed above in the HPI    Objective:     Vitals:    10/06/22 0931 10/06/22 1002   BP: (!) 179/76 (!) 156/87   Pulse: 77    Resp: 16    Temp: 98.7 °F (37.1 °C)    TempSrc: Temporal    SpO2: 99%    Weight: 136 lb (61.7 kg)    Height: 5' 2\" (1.575 m)      Physical Examination: General appearance - alert, well appearing, and in no distress  Mental status - alert, oriented to person, place, and time  Neck - supple, no significant adenopathy  Chest - clear to auscultation, no wheezes, rales or rhonchi, symmetric air entry  Heart - normal rate, regular rhythm, normal S1, S2, no murmurs, rubs, clicks or gallops   Physical Examination: General appearance - alert, well appearing, and in no distress  Mental status - alert, oriented to person, place, and time  Neck - supple, no significant adenopathy  Chest - clear to auscultation, no wheezes, rales or rhonchi, symmetric air entry  Heart - normal rate, regular rhythm, normal S1, S2, no murmurs, rubs, clicks or gallops  Musculoskeletal - no joint tenderness, deformity or swelling  Extremities - peripheral pulses normal, no pedal edema, no clubbing or cyanosis      Assessment/ Plan:   Diagnoses and all orders for this visit:    1. Uncontrolled hypertension  -     METABOLIC PANEL, COMPREHENSIVE; Future  Did not take her meds today  2. Memory problem  -     REFERRAL TO PSYCHOLOGY  Also referring to neurology to [place] for cva  along with the memorry problem may be that she had a stroke at some point  I explained control of the bp is important  Start taking an aspirin each day  Her dauhter feels she is safe living alone  2. Other congestive heart failure (HCC)  stable  3. Type 2 diabetes mellitus with hyperglycemia, without long-term current use of insulin ([facility])  -     HEMOGLOBIN A1C WITH EAG; Future  -      DIABETES FOOT EXAM    4. CKD (chronic kidney disease) stage 4, GFR 15-29 ml/min ([facility])  -     METABOLIC PANEL, COMPREHENSIVE; Future    5. Anemia, chronic disease  -     CBC WITH AUTOMATED DIFF; Future    6 Encounter for immunization  -     INFLUENZA VIRUS VACCINE, HIGH DOSE SEASONAL, PRESERVATIVE FREE    7. Loss of taste  -     REFERRAL TO NEUROLOGY           ICD-10-CM ICD-9-CM    1. Uncontrolled hypertension  D18 300.3 METABOLIC PANEL, COMPREHENSIVE      aspirin delayed-release 81 mg tablet      METABOLIC PANEL, COMPREHENSIVE      2. Other congestive heart failure (HCC)  I50.9 428.0       3. Type 2 diabetes mellitus with hyperglycemia, without long-term current use of insulin ([facility])  E11.65 250.00 HEMOGLOBIN A1C WITH EAG     790.29  DIABETES FOOT EXAM      aspirin delayed-release 81 mg tablet      HEMOGLOBIN A1C WITH EAG      4. CKD (chronic kidney disease) stage 4, GFR 15-29 ml/min ([facility])  Q43.7 967.2 METABOLIC PANEL, COMPREHENSIVE      METABOLIC PANEL, COMPREHENSIVE      5. Anemia, chronic disease  D63.8 285.29 CBC WITH AUTOMATED DIFF      CBC WITH AUTOMATED DIFF      6. Encounter for immunization  Z23 V03.89 INFLUENZA VIRUS VACCINE, HIGH DOSE SEASONAL, PRESERVATIVE FREE      7. Memory problem  R41.3 780.93 REFERRAL TO PSYCHOLOGY      aspirin delayed-release 81 mg tablet      8. Loss of taste  R43.2 781.1 REFERRAL TO NEUROLOGY      aspirin delayed-release 81 mg tablet          I have discussed the diagnosis with the patient and the intended plan as seen in the above orders. The patient has received an after-visit summary  if not on mychart and questions were answered concerning future plans. Patients in [facility] have access to avs without printing    Medication Side Effects and Warnings were discussed with patient:   Patient Labs were reviewed and or requested:   Patient Past Records were reviewed and or requested: yes        There are no Patient Instructions on file for this visit. This is the Subsequent Medicare Annual Wellness Exam, performed 12 months or more after the Initial AWV or the last Subsequent AWV    I have reviewed the patient's medical history in detail and updated the computerized patient record. Assessment/Plan   Education and counseling provided:  Are appropriate based on today's review and evaluation    1. Uncontrolled hypertension  -     METABOLIC PANEL, COMPREHENSIVE; Future  -     aspirin delayed-release 81 mg tablet; Take 1 Tablet by mouth daily. , Normal, Disp-90 Tablet, R-2  2. Other congestive heart failure ([facility] [address]  3. Type 2 diabetes mellitus with hyperglycemia, without long-term current use of insulin (HCC)  -     HEMOGLOBIN A1C WITH EAG; Future  -      DIABETES FOOT EXAM  -     aspirin delayed-release 81 mg tablet; Take 1 Tablet by mouth daily. , Normal, Disp-90 Tablet, R-2  4. CKD (chronic kidney disease) stage 4, GFR 15-29 ml/min ([facility])  -     METABOLIC PANEL, COMPREHENSIVE; Future  5. Anemia, chronic disease  -     CBC WITH AUTOMATED DIFF; Future  6. Encounter for immunization  -     INFLUENZA VIRUS VACCINE, HIGH DOSE SEASONAL, PRESERVATIVE FREE  7. Memory problem  -     REFERRAL TO PSYCHOLOGY  -     aspirin delayed-release 81 mg tablet;  Take 1 Tablet by mouth daily. , Normal, Disp-90 Tablet, R-2  8. Loss of taste  -     REFERRAL TO NEUROLOGY  -     aspirin delayed-release 81 mg tablet; Take 1 Tablet by mouth daily. , Normal, Disp-90 Tablet, R-2       Depression Risk Factor Screening     3 most recent PHQ Screens 10/6/2022   Little interest or pleasure in doing things Not at all   Feeling down, depressed, irritable, or hopeless Not at all   Total Score PHQ 2 0       Alcohol & Drug Abuse Risk Screen    Do you average more than 1 drink per night or more than 7 drinks a week:  No    On any one occasion in the past three months have you have had more than 3 drinks containing alcohol:  No          Functional Ability and Level of Safety    Hearing: Hearing is good. Activities of Daily Living: The home contains: no safety equipment. Patient does total self care      Ambulation: with no difficulty     Fall Risk:  Fall Risk Assessment, last 12 mths 10/6/2022   Able to walk? Yes   Fall in past 12 months? 0   Do you feel unsteady? -   Are you worried about falling -   Is TUG test greater than 12 seconds? -   Is the gait abnormal? -   Number of falls in past 12 months -   Fall with injury?  -      Abuse Screen:  Patient is not abused       Cognitive Screening    Has your family/caregiver stated any concerns about your memory: no     Cognitive Screening: Abnormal - Clock Drawing Test    Health Maintenance Due     Health Maintenance Due   Topic Date Due    Shingrix Vaccine Age 49> (1 of 2) Never done    DTaP/Tdap/Td series (1 - Tdap) Never done    Bone Densitometry (Dexa) Screening  Never done    COVID-19 Vaccine (3 - Booster for Moderna series) 08/06/2021    Medicare Yearly Exam  10/08/2021    Eye Exam Retinal or Dilated  05/22/2022       Patient Care Team   Patient Care Team:  Brit Tellez MD as PCP - General (Family Medicine)  Brit Tellez MD as PCP - [facility] Empaneled Provider    History     Patient Active Problem List   Diagnosis Code    CKD (chronic kidney disease) stage 3, GFR 30-59 ml/min (HCC) N18.30    CKD (chronic kidney disease) stage 4, GFR 15-29 ml/min ([facility]) N18.4     No past medical history on file. Past Surgical History:   Procedure Laterality Date    HX BREAST BIOPSY       Current Outpatient Medications   Medication Sig Dispense Refill    aspirin delayed-release 81 mg tablet Take 1 Tablet by mouth daily. 90 Tablet 2    chlorthalidone (HYGROTON) 25 mg tablet TAKE ONE TABLET BY MOUTH ONCE DAILY 30 Tablet 0    glyBURIDE (DIABETA) 5 mg tablet TAKE ONE (1) TABLET ONCE DAILY BEFORE BREAKFAST. 30 Tablet 0    Eliquis 5 mg tablet TAKE ONE TABLET BY MOUTH TWICE A DAY. 180 Tablet 0    amLODIPine (NORVASC) 5 mg tablet TAKE ONE (1) TABLET BY MOUTH TWICE DAILY 180 Tablet 1    ferrous sulfate (IRON) 325 mg (65 mg iron) EC tablet TAKE 1 TABLET TWICE DAILY 180 Tab 0    sodium bicarbonate 325 mg tablet Take 325 mg by mouth two (2) times a day. cetirizine (ZYRTEC) 10 mg tablet TAKE (1) TABLET DAILY. 30 Tab 0    carvedilol (COREG) 12.5 mg tablet Take 1.5 Tabs by mouth two (2) times a day. hydrALAZINE (APRESOLINE) 25 mg tablet Take 1 Tab by mouth two (2) times a day. amiodarone (CORDARONE) 200 mg tablet Take 200 mg by mouth daily. magnesium oxide (MAG-OX) 400 mg tablet Take 400 mg by mouth daily. losartan (COZAAR) 100 mg tablet Take 1 Tab by mouth daily. (Patient not taking: No sig reported) 90 Tab 3    fluticasone propionate (FLONASE) 50 mcg/actuation nasal spray 2 Sprays by Both Nostrils route daily. (Patient not taking: Reported on 10/6/2022) 1 Bottle 5    diclofenac (VOLTAREN) 1 % gel Apply 2 g to affected area four (4) times daily.  (Patient not taking: No sig reported) 1 Each 3     No Known Allergies    Family History   Problem Relation Age of Onset    Cancer Mother     Heart Disease Mother      Social History     Tobacco Use    Smoking status: Never    Smokeless tobacco: Never   Substance Use Topics    Alcohol use: No         Guido Schaefer MD

## 2022-10-06 NOTE — PROGRESS NOTES
Identified pt with two pt identifiers. Reviewed record in preparation for visit and have obtained necessary documentation. All patient medications has been reviewed. Chief Complaint   Patient presents with    Annual Wellness Visit     Additional information about chief complaint:    Visit Vitals  BP (!) 179/76 (BP 1 Location: Left upper arm, BP Patient Position: Sitting, BP Cuff Size: Adult)   Pulse 77   Temp 98.7 °F (37.1 °C) (Temporal)   Resp 16   Ht 5' 2\" (1.575 m)   Wt 136 lb (61.7 kg)   SpO2 99%   BMI 24.87 kg/m²       Health Maintenance Due   Topic    Shingrix Vaccine Age 50> (1 of 2)    DTaP/Tdap/Td series (1 - Tdap)    Bone Densitometry (Dexa) Screening     Foot Exam Q1     COVID-19 Vaccine (3 - Booster for Moderna series)    Medicare Yearly Exam     Eye Exam Retinal or Dilated     Flu Vaccine (1)    Depression Screen        1. Have you been to the ER, urgent care clinic since your last visit? Hospitalized since your last visit? No    2. Have you seen or consulted any other health care providers outside of the [address] [place] [address] since your last visit? Include any pap smears or colon screening.  No

## 2022-10-06 NOTE — ACP (ADVANCE CARE PLANNING)
Advance Care Planning     Advance Care Planning (ACP) Physician/NP/PA Conversation      Date of Conversation: 10/6/2022  Conducted with: Patient with Decision Making Capacity    Healthcare Decision Maker:   No healthcare decision makers have been documented. Click here to complete [id number] including selection of the Healthcare Decision Maker Relationship (ie \"Primary\")      Bette Gallardo  [phone]  Today we documented Decision Maker(s) consistent with Legal Next of Kin hierarchy. Care Preferences:    Hospitalization: \"If your health worsens and it becomes clear that your chance of recovery is unlikely, what would be your preference regarding hospitalization? \"  The patient would prefer hospitalization. Ventilation: \"If you were unable to breathe on your own and your chance of recovery was unlikely, what would be your preference about the use of a ventilator (breathing machine) if it was available to you? \"   The patient would NOT desire the use of a ventilator. Resuscitation: \"In the event your heart stopped as a result of an underlying serious health condition, would you want attempts to be made to restart your heart, or would you prefer a natural death? \"   Yes, attempt to resuscitate.     Additional topics discussed: ventilation preferences, hospitalization preferences, and resuscitation preferences    Conversation Outcomes / Follow-Up Plan:   ACP complete - no further action today  Reviewed DNR/DNI and patient elects Full Code (Attempt Resuscitation)     Length of Voluntary ACP Conversation in minutes:  16 minutes    Jimmy Briones MD

## 2022-10-13 DIAGNOSIS — E11.21 DIABETES MELLITUS WITH KIDNEY DISEASE (HCC): Primary | ICD-10-CM

## 2022-10-14 DIAGNOSIS — E11.21 DIABETES MELLITUS WITH KIDNEY DISEASE (HCC): Primary | ICD-10-CM

## 2022-10-14 RX ORDER — GLYBURIDE 2.5 MG/1
2.5 TABLET ORAL
Qty: 90 TABLET | Refills: 1 | Status: SHIPPED | OUTPATIENT
Start: 2022-10-14

## 2022-10-14 RX ORDER — LANCETS
EACH MISCELLANEOUS
Qty: 100 EACH | Refills: 3 | Status: SHIPPED | OUTPATIENT
Start: 2022-10-14

## 2022-10-14 RX ORDER — INSULIN PUMP SYRINGE, 3 ML
EACH MISCELLANEOUS
Qty: 1 KIT | Refills: 0 | Status: SHIPPED | OUTPATIENT
Start: 2022-10-14

## 2022-10-14 RX ORDER — IBUPROFEN 200 MG
CAPSULE ORAL
Qty: 100 STRIP | Refills: 3 | Status: SHIPPED | OUTPATIENT
Start: 2022-10-14

## 2022-10-14 NOTE — PROGRESS NOTES
The hemoglobin is slightly better but still a little low. Continue taking the iron tablet as prescribed. The kidney test is stable but still a little abnormal. Continue working on controlling your blood pressure and blood sugar. Avoid salted foods and canned foods. The liver test is normal  The blood sugar test is still in good control. I am concerned about the possibility of your blood sugar dropping too low. I want to decrease the dose of the glyburide to 2.5 mg.  I am also sending prescriptions for a glucometer. I do not see one on your medlist. I want you to be able to check your blood sugar if you feel like it is low. I do not want it to be below 90.

## 2023-01-02 ENCOUNTER — HOSPITAL ENCOUNTER (EMERGENCY)
Age: 88
Discharge: HOME | DRG: 813 | End: 2023-01-02
Attending: STUDENT IN AN ORGANIZED HEALTH CARE EDUCATION/TRAINING PROGRAM
Payer: MEDICARE

## 2023-01-02 ENCOUNTER — APPOINTMENT (OUTPATIENT)
Dept: GENERAL RADIOLOGY | Age: 88
DRG: 813 | End: 2023-01-02
Attending: STUDENT IN AN ORGANIZED HEALTH CARE EDUCATION/TRAINING PROGRAM
Payer: MEDICARE

## 2023-01-02 VITALS
DIASTOLIC BLOOD PRESSURE: 71 MMHG | TEMPERATURE: 98.5 F | HEART RATE: 81 BPM | WEIGHT: 136 LBS | OXYGEN SATURATION: 100 % | HEIGHT: 59 IN | RESPIRATION RATE: 22 BRPM | BODY MASS INDEX: 27.42 KG/M2 | SYSTOLIC BLOOD PRESSURE: 145 MMHG

## 2023-01-02 DIAGNOSIS — S93.401A SPRAIN OF RIGHT ANKLE, UNSPECIFIED LIGAMENT, INITIAL ENCOUNTER: Primary | ICD-10-CM

## 2023-01-02 PROCEDURE — 99283 EMERGENCY DEPT VISIT LOW MDM: CPT

## 2023-01-02 PROCEDURE — 73610 X-RAY EXAM OF ANKLE: CPT

## 2023-01-02 NOTE — ED PROVIDER NOTES
EMERGENCY DEPARTMENT HISTORY AND PHYSICAL EXAM      Date: 1/2/2023  Patient Name: Angela Perez    History of Presenting Illness     Chief Complaint   Patient presents with    Ankle Pain       History Provided By: Patient    HPI: Angela Perez, 80 y.o. female with history of congestive heart failure, diabetes, hypertension, presents for 1 day of right ankle pain. Patient states that she was getting up from the couch when she fell on her ankle. Denies hitting her head or any loss of consciousness. Patient states that she is able to walk on it and took Tylenol for pain. At baseline, she does have neuropathy, but has not had any new or worsening symptoms. Denies any shortness of breath, difficulty breathing, nausea/vomiting, constipation/diarrhea, abdominal pain, rash, night sweats, or chest pain. There are no other complaints, changes, or physical findings at this time. Past History     Past Medical History:  Past Medical History:   Diagnosis Date    CHF (congestive heart failure) ([facility] [address]     Diabetes ([facility] [address]     Hypertension     Kidney disease        Past Surgical History:  Past Surgical History:   Procedure Laterality Date    HX BREAST BIOPSY         Family History:  Family History   Problem Relation Age of Onset    Cancer Mother     Heart Disease Mother        Social History:  Social History     Tobacco Use    Smoking status: Never    Smokeless tobacco: Never   Substance Use Topics    Alcohol use: No    Drug use: No       Allergies:  No Known Allergies    PCP: Karolina Nieto MD    No current facility-administered medications on file prior to encounter. Current Outpatient Medications on File Prior to Encounter   Medication Sig Dispense Refill    chlorthalidone (HYGROTON) 25 mg tablet TAKE ONE TABLET BY MOUTH ONCE DAILY 90 Tablet 1    glyBURIDE (DIABETA) 2.5 mg tablet Take 1 Tablet by mouth Daily (before breakfast).  90 Tablet 1    glucose blood VI test strips (blood glucose test) strip E11.9 check blood sugar daily 100 Strip 3    Blood-Glucose Meter (Blood Glucose Monitoring) monitoring kit E11.9 check blood sugar daily 1 Kit 0    lancets misc E11.9  check blood sugar daily 100 Each 3    aspirin delayed-release 81 mg tablet Take 1 Tablet by mouth daily. 90 Tablet 2    Eliquis 5 mg tablet TAKE ONE TABLET BY MOUTH TWICE A DAY. 180 Tablet 0    amLODIPine (NORVASC) 5 mg tablet TAKE ONE (1) TABLET BY MOUTH TWICE DAILY 180 Tablet 1    ferrous sulfate (IRON) 325 mg (65 mg iron) EC tablet TAKE 1 TABLET TWICE DAILY 180 Tab 0    sodium bicarbonate 325 mg tablet Take 325 mg by mouth two (2) times a day. cetirizine (ZYRTEC) 10 mg tablet TAKE (1) TABLET DAILY. 30 Tab 0    losartan (COZAAR) 100 mg tablet Take 1 Tab by mouth daily. (Patient not taking: No sig reported) 90 Tab 3    [DISCONTINUED] fluticasone propionate (FLONASE) 50 mcg/actuation nasal spray 2 Sprays by Both Nostrils route daily. (Patient not taking: Reported on 10/6/2022) 1 Bottle 5    [DISCONTINUED] diclofenac (VOLTAREN) 1 % gel Apply 2 g to affected area four (4) times daily. (Patient not taking: No sig reported) 1 Each 3    carvedilol (COREG) 12.5 mg tablet Take 1.5 Tabs by mouth two (2) times a day. hydrALAZINE (APRESOLINE) 25 mg tablet Take 1 Tab by mouth two (2) times a day. amiodarone (CORDARONE) 200 mg tablet Take 200 mg by mouth daily. magnesium oxide (MAG-OX) 400 mg tablet Take 400 mg by mouth daily. Review of Systems   Review of Systems   Constitutional:  Negative for activity change, appetite change, chills, diaphoresis, fatigue and fever. HENT:  Negative for congestion, ear pain, rhinorrhea, sinus pressure, sinus pain, sore throat, trouble swallowing and voice change. Eyes:  Negative for photophobia, pain and visual disturbance. Respiratory:  Negative for apnea, cough, choking, shortness of breath and wheezing. Cardiovascular:  Negative for chest pain and palpitations.    Gastrointestinal:  Negative for abdominal pain, constipation, diarrhea, nausea and vomiting. Endocrine: Negative. Genitourinary:  Negative for decreased urine volume, difficulty urinating, dysuria, flank pain, frequency and urgency. Musculoskeletal:  Positive for arthralgias (ankle pain). Negative for back pain, myalgias, neck pain and neck stiffness. Skin:  Negative for color change and pallor. Allergic/Immunologic: Negative. Neurological:  Negative for dizziness, syncope, weakness, light-headedness and headaches. Hematological: Negative. Psychiatric/Behavioral:  Negative for agitation, behavioral problems, confusion and decreased concentration. Physical Exam   Physical Exam  Vitals and nursing note reviewed. Constitutional:       Appearance: Normal appearance. She is normal weight. HENT:      Head: Normocephalic and atraumatic. Right Ear: Tympanic membrane, ear canal and external ear normal.      Left Ear: Tympanic membrane, ear canal and external ear normal.      Nose: Nose normal.      Mouth/Throat:      Mouth: Mucous membranes are moist.      Pharynx: No oropharyngeal exudate or posterior oropharyngeal erythema. Eyes:      Extraocular Movements: Extraocular movements intact. Pupils: Pupils are equal, round, and reactive to light. Cardiovascular:      Rate and Rhythm: Normal rate and regular rhythm. Pulmonary:      Effort: Pulmonary effort is normal. No respiratory distress. Breath sounds: Normal breath sounds. Chest:      Chest wall: No tenderness. Abdominal:      General: Abdomen is flat. There is no distension. Palpations: Abdomen is soft. Musculoskeletal:      Cervical back: Normal range of motion and neck supple. Right ankle: No swelling or deformity. Tenderness present over the lateral malleolus. Decreased range of motion. Normal pulse. Left ankle: Normal.      Right foot: Normal. Normal range of motion and normal capillary refill. No tenderness or bony tenderness. Normal pulse. Left foot: Normal.   Skin:     General: Skin is warm and dry. Neurological:      General: No focal deficit present. Mental Status: She is alert and oriented to person, place, and time. Mental status is at baseline. Psychiatric:         Mood and Affect: Mood normal.         Behavior: Behavior normal.         Thought Content: Thought content normal.       Lab and Diagnostic Study Results   Labs -   No results found for this or any previous visit (from the past 12 hour(s)). Radiologic Studies -   @lastxrresult@  CT Results  (Last 48 hours)      None          CXR Results  (Last 48 hours)      None            Medical Decision Making and ED Course   Differential Diagnosis & Medical Decision Making Provider Note:   Patient presents with ankle pain DDx: Sprain, contusion, fracture, dislocation. X-ray with no bony abnormalities. Patient with sprain to the right ankle. Will provide a brace and orthopedic follow-up as needed. Patient should continue to take over-the-counter pain medication and follow-up with primary care doctor. - I am the first provider for this patient. I reviewed the vital signs, available nursing notes, past medical history, past surgical history, family history and social history. The patients presenting problems have been discussed, and they are in agreement with the care plan formulated and outlined with them. I have encouraged them to ask questions as they arise throughout their visit. Vital Signs-Reviewed the patient's vital signs. Patient Vitals for the past 12 hrs:   Temp Pulse Resp BP SpO2   01/02/23 1535 98.5 °F (36.9 °C) 81 22 (!) 145/71 100 %       ED Course:        Procedures   Performed by: Greg Portillo PA-C  Procedures      Disposition   Disposition: DC- Adult Discharges: All of the diagnostic tests were reviewed and questions answered. Diagnosis, care plan and treatment options were discussed.   The patient understands the instructions and will follow up as directed. The patients results have been reviewed with them. They have been counseled regarding their diagnosis. The patient verbally convey understanding and agreement of the signs, symptoms, diagnosis, treatment and prognosis and additionally agrees to follow up as recommended with their PCP in 24 - 48 hours. They also agree with the care-plan and convey that all of their questions have been answered. I have also put together some discharge instructions for them that include: 1) educational information regarding their diagnosis, 2) how to care for their diagnosis at home, as well a 3) list of reasons why they would want to return to the ED prior to their follow-up appointment, should their condition change. DISCHARGE PLAN:  1. Current Discharge Medication List        CONTINUE these medications which have NOT CHANGED    Details   chlorthalidone (HYGROTON) 25 mg tablet TAKE ONE TABLET BY MOUTH ONCE DAILY  Qty: 90 Tablet, Refills: 1    Associated Diagnoses: Pedal edema      glyBURIDE (DIABETA) 2.5 mg tablet Take 1 Tablet by mouth Daily (before breakfast). Qty: 90 Tablet, Refills: 1    Associated Diagnoses: Diabetes mellitus with kidney disease ([facility] [address]      glucose blood VI test strips (blood glucose test) strip E11.9 check blood sugar daily  Qty: 100 Strip, Refills: 3    Associated Diagnoses: Diabetes mellitus with kidney disease (HCC)      Blood-Glucose Meter (Blood Glucose Monitoring) monitoring kit E11.9 check blood sugar daily  Qty: 1 Kit, Refills: 0    Associated Diagnoses: Diabetes mellitus with kidney disease ([address]      lancets misc E11.9  check blood sugar daily  Qty: 100 Each, Refills: 3    Associated Diagnoses: Diabetes mellitus with kidney disease (HCC)      aspirin delayed-release 81 mg tablet Take 1 Tablet by mouth daily.   Qty: 90 Tablet, Refills: 2    Associated Diagnoses: Uncontrolled hypertension; Type 2 diabetes mellitus with hyperglycemia, without long-term current use of insulin ([address] Memory problem; Loss of taste      Eliquis 5 mg tablet TAKE ONE TABLET BY MOUTH TWICE A DAY. Qty: 180 Tablet, Refills: 0      amLODIPine (NORVASC) 5 mg tablet TAKE ONE (1) TABLET BY MOUTH TWICE DAILY  Qty: 180 Tablet, Refills: 1      ferrous sulfate (IRON) 325 mg (65 mg iron) EC tablet TAKE 1 TABLET TWICE DAILY  Qty: 180 Tab, Refills: 0    Associated Diagnoses: Anemia, unspecified type      sodium bicarbonate 325 mg tablet Take 325 mg by mouth two (2) times a day. cetirizine (ZYRTEC) 10 mg tablet TAKE (1) TABLET DAILY. Qty: 30 Tab, Refills: 0    Associated Diagnoses: Tinnitus of both ears      losartan (COZAAR) 100 mg tablet Take 1 Tab by mouth daily. Qty: 90 Tab, Refills: 3      carvedilol (COREG) 12.5 mg tablet Take 1.5 Tabs by mouth two (2) times a day. hydrALAZINE (APRESOLINE) 25 mg tablet Take 1 Tab by mouth two (2) times a day. amiodarone (CORDARONE) 200 mg tablet Take 200 mg by mouth daily. magnesium oxide (MAG-OX) 400 mg tablet Take 400 mg by mouth daily. 2.   Follow-up Information       Follow up With Specialties Details Why Contact Info    Virginie Springer MD Family Medicine, Bariatrics   [address] [address]  [phone]      [address] [place] [address] Emergency Medicine   [address] [place] [phone]  [address] [place] [address],[address], [address] [facility] Orthopedic Surgery   [address]  [phone]            3. Return to ED if worse   4. Discharge Medication List as of 1/2/2023  5:01 PM         Remove if admitted/transferred    Diagnosis/Clinical Impression     Clinical Impression:   1. Sprain of right ankle, unspecified ligament, initial encounter        Attestations: Ken Ching PA-C, am the primary clinician of record. Please note that this dictation was completed with [employer], the [employer] voice recognition software.   Quite often unanticipated grammatical, syntax, homophones, and other interpretive errors are inadvertently transcribed by the computer software. Please disregard these errors. Please excuse any errors that have escaped final proofreading. Thank you.

## 2023-01-03 ENCOUNTER — APPOINTMENT (OUTPATIENT)
Dept: CT IMAGING | Age: 88
DRG: 813 | End: 2023-01-03
Attending: EMERGENCY MEDICINE
Payer: MEDICARE

## 2023-01-03 ENCOUNTER — APPOINTMENT (OUTPATIENT)
Dept: CT IMAGING | Age: 88
DRG: 813 | End: 2023-01-03
Attending: FAMILY MEDICINE
Payer: MEDICARE

## 2023-01-03 ENCOUNTER — HOSPITAL ENCOUNTER (INPATIENT)
Age: 88
LOS: 6 days | Discharge: HOME | DRG: 813 | End: 2023-01-09
Attending: EMERGENCY MEDICINE | Admitting: FAMILY MEDICINE
Payer: MEDICARE

## 2023-01-03 DIAGNOSIS — D64.9 ANEMIA, UNSPECIFIED TYPE: Primary | ICD-10-CM

## 2023-01-03 DIAGNOSIS — K92.2 GASTROINTESTINAL HEMORRHAGE, UNSPECIFIED GASTROINTESTINAL HEMORRHAGE TYPE: ICD-10-CM

## 2023-01-03 DIAGNOSIS — N28.9 RENAL INSUFFICIENCY: ICD-10-CM

## 2023-01-03 LAB
ALBUMIN SERPL-MCNC: 3.2 G/DL (ref 3.5–5)
ALBUMIN/GLOB SERPL: 1.1 (ref 1.1–2.2)
ALP SERPL-CCNC: 82 U/L (ref 45–117)
ALT SERPL-CCNC: 18 U/L (ref 12–78)
ANION GAP SERPL CALC-SCNC: 9 MMOL/L (ref 5–15)
APPEARANCE UR: CLEAR
AST SERPL W P-5'-P-CCNC: 21 U/L (ref 15–37)
ATRIAL RATE: 110 BPM
BACTERIA URNS QL MICRO: NEGATIVE /HPF
BASOPHILS # BLD: 0 K/UL (ref 0–0.1)
BASOPHILS NFR BLD: 0 % (ref 0–1)
BILIRUB SERPL-MCNC: 0.4 MG/DL (ref 0.2–1)
BILIRUB UR QL: NEGATIVE
BNP SERPL-MCNC: 535 PG/ML
BUN SERPL-MCNC: 72 MG/DL (ref 6–20)
BUN/CREAT SERPL: 39 (ref 12–20)
CA-I BLD-MCNC: 9.2 MG/DL (ref 8.5–10.1)
CALCULATED R AXIS, ECG10: -19 DEGREES
CALCULATED T AXIS, ECG11: 25 DEGREES
CHLORIDE SERPL-SCNC: 108 MMOL/L (ref 97–108)
CO2 SERPL-SCNC: 27 MMOL/L (ref 21–32)
COLOR UR: ABNORMAL
CREAT SERPL-MCNC: 1.86 MG/DL (ref 0.55–1.02)
DIAGNOSIS, 93000: NORMAL
DIFFERENTIAL METHOD BLD: ABNORMAL
EOSINOPHIL # BLD: 0 K/UL (ref 0–0.4)
EOSINOPHIL NFR BLD: 0 % (ref 0–7)
EPITH CASTS URNS QL MICRO: NORMAL /LPF
ERYTHROCYTE [DISTWIDTH] IN BLOOD BY AUTOMATED COUNT: 13.4 % (ref 11.5–14.5)
GLOBULIN SER CALC-MCNC: 2.8 G/DL (ref 2–4)
GLUCOSE BLD STRIP.AUTO-MCNC: 146 MG/DL (ref 65–100)
GLUCOSE SERPL-MCNC: 185 MG/DL (ref 65–100)
GLUCOSE UR STRIP.AUTO-MCNC: NEGATIVE MG/DL
HCT VFR BLD AUTO: 18.3 % (ref 35–47)
HGB BLD-MCNC: 5.7 G/DL (ref 11.5–16)
HGB UR QL STRIP: NEGATIVE
HYALINE CASTS URNS QL MICRO: NORMAL /LPF (ref 0–5)
IMM GRANULOCYTES # BLD AUTO: 0 K/UL (ref 0–0.04)
IMM GRANULOCYTES NFR BLD AUTO: 1 % (ref 0–0.5)
INR PPP: 1.1 (ref 0.9–1.1)
KETONES UR QL STRIP.AUTO: NEGATIVE MG/DL
LEUKOCYTE ESTERASE UR QL STRIP.AUTO: ABNORMAL
LYMPHOCYTES # BLD: 1.9 K/UL (ref 0.8–3.5)
LYMPHOCYTES NFR BLD: 23 % (ref 12–49)
MCH RBC QN AUTO: 24.5 PG (ref 26–34)
MCHC RBC AUTO-ENTMCNC: 31.1 G/DL (ref 30–36.5)
MCV RBC AUTO: 78.5 FL (ref 80–99)
MONOCYTES # BLD: 0.6 K/UL (ref 0–1)
MONOCYTES NFR BLD: 7 % (ref 5–13)
NEUTS SEG # BLD: 5.9 K/UL (ref 1.8–8)
NEUTS SEG NFR BLD: 69 % (ref 32–75)
NITRITE UR QL STRIP.AUTO: NEGATIVE
NRBC # BLD: 0 K/UL (ref 0–0.01)
NRBC BLD-RTO: 0 PER 100 WBC
PERFORMED BY, TECHID: ABNORMAL
PH UR STRIP: 5 (ref 5–8)
PLATELET # BLD AUTO: 170 K/UL (ref 150–400)
PMV BLD AUTO: 10.6 FL (ref 8.9–12.9)
POTASSIUM SERPL-SCNC: 4.3 MMOL/L (ref 3.5–5.1)
PROT SERPL-MCNC: 6 G/DL (ref 6.4–8.2)
PROT UR STRIP-MCNC: NEGATIVE MG/DL
PROTHROMBIN TIME: 14.8 SEC (ref 11.9–14.6)
Q-T INTERVAL, ECG07: 420 MS
QRS DURATION, ECG06: 134 MS
QTC CALCULATION (BEZET), ECG08: 499 MS
RBC # BLD AUTO: 2.33 M/UL (ref 3.8–5.2)
RBC #/AREA URNS HPF: NORMAL /HPF (ref 0–5)
SODIUM SERPL-SCNC: 144 MMOL/L (ref 136–145)
SP GR UR REFRACTOMETRY: 1.02 (ref 1–1.03)
TROPONIN-HIGH SENSITIVITY: 36 NG/L (ref 0–51)
UROBILINOGEN UR QL STRIP.AUTO: 0.1 EU/DL (ref 0.1–1)
VENTRICULAR RATE, ECG03: 85 BPM
WBC # BLD AUTO: 8.5 K/UL (ref 3.6–11)
WBC URNS QL MICRO: NORMAL /HPF (ref 0–4)

## 2023-01-03 PROCEDURE — 86923 COMPATIBILITY TEST ELECTRIC: CPT

## 2023-01-03 PROCEDURE — 94761 N-INVAS EAR/PLS OXIMETRY MLT: CPT

## 2023-01-03 PROCEDURE — P9016 RBC LEUKOCYTES REDUCED: HCPCS

## 2023-01-03 PROCEDURE — 85610 PROTHROMBIN TIME: CPT

## 2023-01-03 PROCEDURE — 85025 COMPLETE CBC W/AUTO DIFF WBC: CPT

## 2023-01-03 PROCEDURE — 36430 TRANSFUSION BLD/BLD COMPNT: CPT

## 2023-01-03 PROCEDURE — 74011250636 HC RX REV CODE- 250/636: Performed by: FAMILY MEDICINE

## 2023-01-03 PROCEDURE — 93005 ELECTROCARDIOGRAM TRACING: CPT

## 2023-01-03 PROCEDURE — C9113 INJ PANTOPRAZOLE SODIUM, VIA: HCPCS | Performed by: FAMILY MEDICINE

## 2023-01-03 PROCEDURE — 86900 BLOOD TYPING SEROLOGIC ABO: CPT

## 2023-01-03 PROCEDURE — 82962 GLUCOSE BLOOD TEST: CPT

## 2023-01-03 PROCEDURE — 36415 COLL VENOUS BLD VENIPUNCTURE: CPT

## 2023-01-03 PROCEDURE — 84484 ASSAY OF TROPONIN QUANT: CPT

## 2023-01-03 PROCEDURE — 81001 URINALYSIS AUTO W/SCOPE: CPT

## 2023-01-03 PROCEDURE — 80053 COMPREHEN METABOLIC PANEL: CPT

## 2023-01-03 PROCEDURE — 83880 ASSAY OF NATRIURETIC PEPTIDE: CPT

## 2023-01-03 PROCEDURE — 65270000029 HC RM PRIVATE

## 2023-01-03 PROCEDURE — 74011636637 HC RX REV CODE- 636/637: Performed by: FAMILY MEDICINE

## 2023-01-03 PROCEDURE — 74011000250 HC RX REV CODE- 250: Performed by: FAMILY MEDICINE

## 2023-01-03 PROCEDURE — 74176 CT ABD & PELVIS W/O CONTRAST: CPT

## 2023-01-03 PROCEDURE — 99285 EMERGENCY DEPT VISIT HI MDM: CPT

## 2023-01-03 RX ORDER — POLYETHYLENE GLYCOL 3350 17 G/17G
17 POWDER, FOR SOLUTION ORAL DAILY PRN
Status: DISCONTINUED | OUTPATIENT
Start: 2023-01-03 | End: 2023-01-09 | Stop reason: HOSPADM

## 2023-01-03 RX ORDER — AMLODIPINE BESYLATE 5 MG/1
5 TABLET ORAL DAILY
Status: DISCONTINUED | OUTPATIENT
Start: 2023-01-04 | End: 2023-01-09 | Stop reason: HOSPADM

## 2023-01-03 RX ORDER — SODIUM CHLORIDE 9 MG/ML
100 INJECTION, SOLUTION INTRAVENOUS ONCE
Status: COMPLETED | OUTPATIENT
Start: 2023-01-03 | End: 2023-01-03

## 2023-01-03 RX ORDER — SODIUM CHLORIDE 9 MG/ML
75 INJECTION, SOLUTION INTRAVENOUS CONTINUOUS
Status: DISCONTINUED | OUTPATIENT
Start: 2023-01-03 | End: 2023-01-05

## 2023-01-03 RX ORDER — HYDRALAZINE HYDROCHLORIDE 25 MG/1
25 TABLET, FILM COATED ORAL 2 TIMES DAILY
Status: DISCONTINUED | OUTPATIENT
Start: 2023-01-03 | End: 2023-01-09 | Stop reason: HOSPADM

## 2023-01-03 RX ORDER — ACETAMINOPHEN 650 MG/1
650 SUPPOSITORY RECTAL
Status: DISCONTINUED | OUTPATIENT
Start: 2023-01-03 | End: 2023-01-09 | Stop reason: HOSPADM

## 2023-01-03 RX ORDER — INSULIN LISPRO 100 [IU]/ML
INJECTION, SOLUTION INTRAVENOUS; SUBCUTANEOUS
Status: DISCONTINUED | OUTPATIENT
Start: 2023-01-03 | End: 2023-01-09 | Stop reason: HOSPADM

## 2023-01-03 RX ORDER — ONDANSETRON 4 MG/1
4 TABLET, ORALLY DISINTEGRATING ORAL
Status: DISCONTINUED | OUTPATIENT
Start: 2023-01-03 | End: 2023-01-09 | Stop reason: HOSPADM

## 2023-01-03 RX ORDER — SODIUM CHLORIDE 9 MG/ML
250 INJECTION, SOLUTION INTRAVENOUS AS NEEDED
Status: DISCONTINUED | OUTPATIENT
Start: 2023-01-03 | End: 2023-01-09 | Stop reason: HOSPADM

## 2023-01-03 RX ORDER — AMIODARONE HYDROCHLORIDE 200 MG/1
200 TABLET ORAL DAILY
Status: DISCONTINUED | OUTPATIENT
Start: 2023-01-04 | End: 2023-01-05 | Stop reason: SDUPTHER

## 2023-01-03 RX ORDER — ONDANSETRON 2 MG/ML
4 INJECTION INTRAMUSCULAR; INTRAVENOUS
Status: DISCONTINUED | OUTPATIENT
Start: 2023-01-03 | End: 2023-01-09 | Stop reason: HOSPADM

## 2023-01-03 RX ORDER — ACETAMINOPHEN 325 MG/1
650 TABLET ORAL
Status: DISCONTINUED | OUTPATIENT
Start: 2023-01-03 | End: 2023-01-09 | Stop reason: HOSPADM

## 2023-01-03 RX ORDER — IBUPROFEN 200 MG
4 TABLET ORAL AS NEEDED
Status: DISCONTINUED | OUTPATIENT
Start: 2023-01-03 | End: 2023-01-09 | Stop reason: HOSPADM

## 2023-01-03 RX ORDER — SODIUM BICARBONATE 650 MG/1
325 TABLET ORAL 2 TIMES DAILY
Status: DISCONTINUED | OUTPATIENT
Start: 2023-01-03 | End: 2023-01-09 | Stop reason: HOSPADM

## 2023-01-03 RX ORDER — LANOLIN ALCOHOL/MO/W.PET/CERES
325 CREAM (GRAM) TOPICAL 2 TIMES DAILY
Status: DISCONTINUED | OUTPATIENT
Start: 2023-01-04 | End: 2023-01-09 | Stop reason: HOSPADM

## 2023-01-03 RX ADMIN — SODIUM CHLORIDE 75 ML/HR: 9 INJECTION, SOLUTION INTRAVENOUS at 23:06

## 2023-01-03 RX ADMIN — SODIUM CHLORIDE 100 ML/HR: 9 INJECTION, SOLUTION INTRAVENOUS at 16:34

## 2023-01-03 RX ADMIN — INSULIN LISPRO 3 UNITS: 100 INJECTION, SOLUTION INTRAVENOUS; SUBCUTANEOUS at 22:00

## 2023-01-03 RX ADMIN — SODIUM CHLORIDE, PRESERVATIVE FREE 40 MG: 5 INJECTION INTRAVENOUS at 16:33

## 2023-01-03 NOTE — ED NOTES
Bedside shift report given to [facility] SURGICAL [facility]

## 2023-01-03 NOTE — MED STUDENT NOTES
History and Physical    NAME: Bronwyn Mejía   :  1935   MRN:  [id number]     Date/Time:  1/3/2023 3:59 PM    Patient PCP: Ania Lee MD  ______________________________________________________________________             Subjective:     CHIEF COMPLAINT: blood in stool        HISTORY OF PRESENT ILLNESS:       Patient is a 80y.o. year old female with PMH of HTN, CHF, renal insufficiency, and type II DM presents to ED with complaint of bloody stool that began . Patient experiences bloody stool whenever she attempts to eat or drink water. She states she has not been able to eat since . The blood is described as a mix of dark and bright red at different times. Patient had a colonoscopy done >5 years ago which did not reveal any abnormalities. Per daughter, Maximilian Abel, last meal patient had was shrimp fried rice from unknown restaurant. Patient denies fever and abdominal pain. Only stated pain is in her right ankle, which she sprained. Hgb 5.7 in ED. Blood type O positive. Past Medical History:   Diagnosis Date    CHF (congestive heart failure) (HCC)     Diabetes ([address]     Hypertension     Kidney disease         Past Surgical History:   Procedure Laterality Date    HX BREAST BIOPSY         Social History     Tobacco Use    Smoking status: Never    Smokeless tobacco: Never   Substance Use Topics    Alcohol use: No        Family History   Problem Relation Age of Onset    Cancer Mother     Heart Disease Mother        No Known Allergies     Prior to Admission medications    Medication Sig Start Date End Date Taking? Authorizing Provider   chlorthalidone (HYGROTON) 25 mg tablet TAKE ONE TABLET BY MOUTH ONCE DAILY 22   Ania Lee MD   glyBURIDE (DIABETA) 2.5 mg tablet Take 1 Tablet by mouth Daily (before breakfast).  10/14/22   Ania Lee MD   glucose blood VI test strips (blood glucose test) strip E11.9 check blood sugar daily 10/14/22   Ania Lee MD   Blood-Glucose Meter (Blood Glucose Monitoring) monitoring kit E11.9 check blood sugar daily 10/14/22   Luis Virgen MD   lancets misc E11.9  check blood sugar daily 10/14/22   Luis Virgen MD   aspirin delayed-release 81 mg tablet Take 1 Tablet by mouth daily. 10/6/22   Luis Virgen MD   Eliquis 5 mg tablet TAKE ONE TABLET BY MOUTH TWICE A DAY. 5/17/22   Luis Virgen MD   amLODIPine (NORVASC) 5 mg tablet TAKE ONE (1) TABLET BY MOUTH TWICE DAILY 11/10/21   Luis Virgen MD   ferrous sulfate (IRON) 325 mg (65 mg iron) EC tablet TAKE 1 TABLET TWICE DAILY 11/8/20   Luis Virgen MD   sodium bicarbonate 325 mg tablet Take 325 mg by mouth two (2) times a day. Provider, Historical   cetirizine (ZYRTEC) 10 mg tablet TAKE (1) TABLET DAILY. 9/29/20   Luis Virgen MD   losartan (COZAAR) 100 mg tablet Take 1 Tab by mouth daily. Patient not taking: No sig reported 3/7/20   Luis Virgen MD   carvedilol (COREG) 12.5 mg tablet Take 1.5 Tabs by mouth two (2) times a day. 2/22/19   Provider, Historical   hydrALAZINE (APRESOLINE) 25 mg tablet Take 1 Tab by mouth two (2) times a day. 2/20/19   Provider, Historical   amiodarone (CORDARONE) 200 mg tablet Take 200 mg by mouth daily. Provider, Historical   magnesium oxide (MAG-OX) 400 mg tablet Take 400 mg by mouth daily. Provider, Historical         Current Facility-Administered Medications:     pantoprazole (PROTONIX) 40 mg in 0.9% sodium chloride 10 mL injection, 40 mg, IntraVENous, NOW, Angelika Juan MD    0.9% sodium chloride infusion, 100 mL/hr, IntraVENous, ONCE, Yenifer Juan MD    0.9% sodium chloride infusion 250 mL, 250 mL, IntraVENous, PRN, Yenifer Juan MD    Current Outpatient Medications:     chlorthalidone (HYGROTON) 25 mg tablet, TAKE ONE TABLET BY MOUTH ONCE DAILY, Disp: 90 Tablet, Rfl: 1    glyBURIDE (DIABETA) 2.5 mg tablet, Take 1 Tablet by mouth Daily (before breakfast). , Disp: 90 Tablet, Rfl: 1    glucose blood VI test strips (blood glucose test) strip, E11.9 check blood sugar daily, Disp: 100 Strip, Rfl: 3    Blood-Glucose Meter (Blood Glucose Monitoring) monitoring kit, E11.9 check blood sugar daily, Disp: 1 Kit, Rfl: 0    lancets misc, E11.9  check blood sugar daily, Disp: 100 Each, Rfl: 3    aspirin delayed-release 81 mg tablet, Take 1 Tablet by mouth daily. , Disp: 90 Tablet, Rfl: 2    Eliquis 5 mg tablet, TAKE ONE TABLET BY MOUTH TWICE A DAY., Disp: 180 Tablet, Rfl: 0    amLODIPine (NORVASC) 5 mg tablet, TAKE ONE (1) TABLET BY MOUTH TWICE DAILY, Disp: 180 Tablet, Rfl: 1    ferrous sulfate (IRON) 325 mg (65 mg iron) EC tablet, TAKE 1 TABLET TWICE DAILY, Disp: 180 Tab, Rfl: 0    sodium bicarbonate 325 mg tablet, Take 325 mg by mouth two (2) times a day., Disp: , Rfl:     cetirizine (ZYRTEC) 10 mg tablet, TAKE (1) TABLET DAILY. , Disp: 30 Tab, Rfl: 0    losartan (COZAAR) 100 mg tablet, Take 1 Tab by mouth daily. (Patient not taking: No sig reported), Disp: 90 Tab, Rfl: 3    carvedilol (COREG) 12.5 mg tablet, Take 1.5 Tabs by mouth two (2) times a day., Disp: , Rfl:     hydrALAZINE (APRESOLINE) 25 mg tablet, Take 1 Tab by mouth two (2) times a day., Disp: , Rfl:     amiodarone (CORDARONE) 200 mg tablet, Take 200 mg by mouth daily. , Disp: , Rfl:     magnesium oxide (MAG-OX) 400 mg tablet, Take 400 mg by mouth daily. , Disp: , Rfl:     LAB DATA REVIEWED:    Recent Results (from the past 24 hour(s))   CBC WITH AUTOMATED DIFF    Collection Time: 01/03/23  2:18 PM   Result Value Ref Range    WBC 8.5 3.6 - 11.0 K/uL    RBC 2.33 (L) 3.80 - 5.20 M/uL    HGB 5.7 (LL) 11.5 - 16.0 g/dL    HCT 18.3 (L) 35.0 - 47.0 %    MCV 78.5 (L) 80.0 - 99.0 FL    MCH 24.5 (L) 26.0 - 34.0 PG    MCHC 31.1 30.0 - 36.5 g/dL    RDW 13.4 11.5 - 14.5 %    PLATELET 119 274 - 422 K/uL    MPV 10.6 8.9 - 12.9 FL    NRBC 0.0 0.0  WBC    ABSOLUTE NRBC 0.00 0.00 - 0.01 K/uL    NEUTROPHILS 69 32 - 75 %    LYMPHOCYTES 23 12 - 49 %    MONOCYTES 7 5 - 13 %    EOSINOPHILS 0 0 - 7 %    BASOPHILS 0 0 - 1 %    IMMATURE GRANULOCYTES 1 (H) 0 - 0.5 %    ABS. NEUTROPHILS 5.9 1.8 - 8.0 K/UL    ABS. LYMPHOCYTES 1.9 0.8 - 3.5 K/UL    ABS. MONOCYTES 0.6 0.0 - 1.0 K/UL    ABS. EOSINOPHILS 0.0 0.0 - 0.4 K/UL    ABS. BASOPHILS 0.0 0.0 - 0.1 K/UL    ABS. IMM. GRANS. 0.0 0.00 - 0.04 K/UL    DF AUTOMATED     TYPE & SCREEN    Collection Time: 01/03/23  2:18 PM   Result Value Ref Range    Crossmatch Expiration 01/06/2023,2359     ABO/Rh(D) O Positive     Antibody screen Negative    METABOLIC PANEL, COMPREHENSIVE    Collection Time: 01/03/23  2:18 PM   Result Value Ref Range    Sodium 144 136 - 145 mmol/L    Potassium 4.3 3.5 - 5.1 mmol/L    Chloride 108 97 - 108 mmol/L    CO2 27 21 - 32 mmol/L    Anion gap 9 5 - 15 mmol/L    Glucose 185 (H) 65 - 100 mg/dL    BUN 72 (H) 6 - 20 mg/dL    Creatinine 1.86 (H) 0.55 - 1.02 mg/dL    BUN/Creatinine ratio 39 (H) 12 - 20      eGFR 26 (L) >60 ml/min/1.73m2    Calcium 9.2 8.5 - 10.1 mg/dL    Bilirubin, total 0.4 0.2 - 1.0 mg/dL    AST (SGOT) 21 15 - 37 U/L    ALT (SGPT) 18 12 - 78 U/L    Alk.  phosphatase 82 45 - 117 U/L    Protein, total 6.0 (L) 6.4 - 8.2 g/dL    Albumin 3.2 (L) 3.5 - 5.0 g/dL    Globulin 2.8 2.0 - 4.0 g/dL    A-G Ratio 1.1 1.1 - 2.2     NT-PRO BNP    Collection Time: 01/03/23  2:18 PM   Result Value Ref Range    NT pro- (H) <450 pg/mL   TROPONIN-HIGH SENSITIVITY    Collection Time: 01/03/23  2:18 PM   Result Value Ref Range    Troponin-High Sensitivity 36 0 - 51 ng/L   URINALYSIS W/ RFLX MICROSCOPIC    Collection Time: 01/03/23  2:52 PM   Result Value Ref Range    Color Yellow/Straw      Appearance Clear Clear      Specific gravity 1.018 1.003 - 1.030      pH (UA) 5.0 5.0 - 8.0      Protein Negative Negative mg/dL    Glucose Negative Negative mg/dL    Ketone Negative Negative mg/dL    Bilirubin Negative Negative      Blood Negative Negative      Urobilinogen 0.1 0.1 - 1.0 EU/dL    Nitrites Negative Negative      Leukocyte Esterase Small (A) Negative     URINE MICROSCOPIC Collection Time: 01/03/23  2:52 PM   Result Value Ref Range    WBC 5-10 0 - 4 /hpf    RBC 0-5 0 - 5 /hpf    Epithelial cells Few Few /lpf    Bacteria Negative Negative /hpf    Hyaline cast 2-5 0 - 5 /lpf   PROTHROMBIN TIME + INR    Collection Time: 01/03/23  3:14 PM   Result Value Ref Range    Prothrombin time 14.8 (H) 11.9 - 14.6 sec    INR 1.1 0.9 - 1.1         XR Results (most recent):  Results from Hospital Encounter encounter on 01/02/23    XR ANKLE RT MIN 3 V    Narrative  EXAM: XR ANKLE RT MIN 3 V    INDICATION: pain s/p GLF    COMPARISON: None. FINDINGS: Three views of the right ankle. The ankle mortise is intact. The talar  dome is intact. There is a small osteophyte in the anterior tibial plafond and. There is a plantar calcaneal enthesophyte. No significant tibiotalar joint  effusion. There is mild soft tissue swelling of the lateral side the ankle. .  Atherosclerotic calcifications are present. Impression  Mild soft tissue swelling along lateral side of the ankle. CTA ABDOMEN PELV W CONT    (Results Pending)        Review of Systems:  Constitutional: Negative for chills and fever. HENT: Negative. Eyes: Negative. Respiratory: Negative. Cardiovascular: Negative. Gastrointestinal: Negative for abdominal pain and nausea. Positive for blood in stool  Skin: Negative. Neurological: Negative. Objective:   VITALS:    Visit Vitals  BP (!) 147/71   Pulse 92   Temp 98.1 °F (36.7 °C)   Resp 17   Ht 4' 11\" (1.499 m)   Wt 136 lb (61.7 kg)   SpO2 97%   BMI 27.47 kg/m²       Physical Exam:   Constitutional: pt is oriented to person, place, and time. HENT:   Head: Normocephalic and atraumatic. Eyes: Pupils are equal, round, and reactive to light. EOM are normal.   Cardiovascular: Normal rate, regular rhythm and normal heart sounds. Pulmonary/Chest: Breath sounds normal. No wheezes. No rales. Exhibits no tenderness. Abdominal: Soft.  Bowel sounds are normal. There is no abdominal tenderness. There is no rebound and no guarding. Musculoskeletal: Normal range of motion. Neurological: pt is alert and oriented to person, place, and time. Alert. Normal strength. No cranial nerve deficit or sensory deficit. Displays a negative Romberg sign. ASSESSMENT & PLAN:  Acute GI bleed of unspecified location  Discontinue Eliquis.   CT of abd w/contrast order was discontinued, evaluate risk and benefits with underlying kidney disease  Acute anemia   Hgb 5.7  Blood type O positive  CKD stage 3  Possibly progressed to stage 4   Or SRINIVASA on CKD   On sodium bicarb  Diabetes  Blood glucose 185  Per Pt, she is not currently on medication  Consider adding insulin  CHF    HTN  Continue home medications    Consult with GI  Continue with 3 units packed RBC's transfusion  Discontinue eliquis  Patient NPO   ________________________________________________________________________    Signed: Giuseppe Robertson

## 2023-01-03 NOTE — ED PROVIDER NOTES
EMERGENCY DEPARTMENT HISTORY AND PHYSICAL EXAM      Date: 1/3/2023  Patient Name: Eden Torres    History of Presenting Illness     Chief Complaint   Patient presents with    Rectal Bleeding       History Provided By: Patient and Patient's Daughter    HPI: Eden Torres, 80 y.o. female with a past medical history significant No significant past medical history presents to the ED with chief complaint of Rectal Bleeding  . 9year-old female presents with bright red blood per rectum. Patient noticed that started on Sunday presents to the ER on Tuesday. Feels lightheaded dizzy no syncope. No abdominal pain no rectal pain. No prior history of the same. No change in any medications. No prior history of bleeding like this has not seen a GI doctor. There are no other complaints, changes, or physical findings at this time. PCP: Dotty Awad MD    Current Facility-Administered Medications   Medication Dose Route Frequency Provider Last Rate Last Admin    pantoprazole (PROTONIX) 40 mg in 0.9% sodium chloride 10 mL injection  40 mg IntraVENous NOW Lianne Juan MD        0.9% sodium chloride infusion  100 mL/hr IntraVENous ONCE Richard Ghosh MD        0.9% sodium chloride infusion 250 mL  250 mL IntraVENous PRN Elisa VEGA MD         Current Outpatient Medications   Medication Sig Dispense Refill    chlorthalidone (HYGROTON) 25 mg tablet TAKE ONE TABLET BY MOUTH ONCE DAILY 90 Tablet 1    glyBURIDE (DIABETA) 2.5 mg tablet Take 1 Tablet by mouth Daily (before breakfast). 90 Tablet 1    glucose blood VI test strips (blood glucose test) strip E11.9 check blood sugar daily 100 Strip 3    Blood-Glucose Meter (Blood Glucose Monitoring) monitoring kit E11.9 check blood sugar daily 1 Kit 0    lancets misc E11.9  check blood sugar daily 100 Each 3    aspirin delayed-release 81 mg tablet Take 1 Tablet by mouth daily. 90 Tablet 2    Eliquis 5 mg tablet TAKE ONE TABLET BY MOUTH TWICE A DAY. 180 Tablet 0    amLODIPine (NORVASC) 5 mg tablet TAKE ONE (1) TABLET BY MOUTH TWICE DAILY 180 Tablet 1    ferrous sulfate (IRON) 325 mg (65 mg iron) EC tablet TAKE 1 TABLET TWICE DAILY 180 Tab 0    sodium bicarbonate 325 mg tablet Take 325 mg by mouth two (2) times a day. cetirizine (ZYRTEC) 10 mg tablet TAKE (1) TABLET DAILY. 30 Tab 0    losartan (COZAAR) 100 mg tablet Take 1 Tab by mouth daily. (Patient not taking: No sig reported) 90 Tab 3    carvedilol (COREG) 12.5 mg tablet Take 1.5 Tabs by mouth two (2) times a day. hydrALAZINE (APRESOLINE) 25 mg tablet Take 1 Tab by mouth two (2) times a day. amiodarone (CORDARONE) 200 mg tablet Take 200 mg by mouth daily. magnesium oxide (MAG-OX) 400 mg tablet Take 400 mg by mouth daily. Past History     Past Medical History:  Past Medical History:   Diagnosis Date    CHF (congestive heart failure) ([facility] [address]     Diabetes ([facility] [address]     Hypertension     Kidney disease        Past Surgical History:  Past Surgical History:   Procedure Laterality Date    HX BREAST BIOPSY         Family History:  Family History   Problem Relation Age of Onset    Cancer Mother     Heart Disease Mother        Social History:  Social History     Tobacco Use    Smoking status: Never    Smokeless tobacco: Never   Substance Use Topics    Alcohol use: No    Drug use: No       Allergies:  No Known Allergies      Review of Systems   Review of Systems   Constitutional: Negative. Negative for chills, fatigue and fever. HENT: Negative. Negative for congestion, nosebleeds and sore throat. Eyes: Negative. Negative for pain, discharge and visual disturbance. Respiratory: Negative. Negative for cough, chest tightness and shortness of breath. Cardiovascular:  Negative for chest pain, palpitations and leg swelling. Gastrointestinal:  Positive for blood in stool. Negative for abdominal pain, constipation, diarrhea, nausea and vomiting. Endocrine: Negative.     Genitourinary: Negative. Negative for difficulty urinating, dysuria, pelvic pain and vaginal bleeding. Musculoskeletal: Negative. Negative for arthralgias, back pain and myalgias. Skin: Negative. Negative for rash and wound. Allergic/Immunologic: Negative. Neurological: Negative. Negative for dizziness, syncope, weakness, numbness and headaches. Hematological: Negative. Psychiatric/Behavioral: Negative. Negative for agitation, confusion and suicidal ideas. All other systems reviewed and are negative. Physical Exam   Patient Vitals for the past 24 hrs:   Temp Pulse Resp BP SpO2   01/03/23 1331 98.1 °F (36.7 °C) 92 17 (!) 147/71 97 %         Physical Exam  Vitals and nursing note reviewed. Exam conducted with a chaperone present. Constitutional:       Appearance: Normal appearance. She is normal weight. HENT:      Head: Normocephalic and atraumatic. Nose: Nose normal.      Mouth/Throat:      Mouth: Mucous membranes are moist.      Pharynx: Oropharynx is clear. Eyes:      Extraocular Movements: Extraocular movements intact. Conjunctiva/sclera: Conjunctivae normal.      Pupils: Pupils are equal, round, and reactive to light. Cardiovascular:      Rate and Rhythm: Normal rate and regular rhythm. Pulses: Normal pulses. Heart sounds: Normal heart sounds. Pulmonary:      Effort: Pulmonary effort is normal. No respiratory distress. Breath sounds: Normal breath sounds. Abdominal:      General: Abdomen is flat. Bowel sounds are normal. There is no distension. Palpations: Abdomen is soft. Tenderness: There is no abdominal tenderness. There is no guarding. Musculoskeletal:         General: No swelling, tenderness, deformity or signs of injury. Normal range of motion. Cervical back: Normal range of motion and neck supple. Right lower leg: No edema. Left lower leg: No edema. Comments: R ankle brace   Skin:     General: Skin is warm and dry. Capillary Refill: Capillary refill takes less than 2 seconds. Findings: No lesion or rash. Neurological:      General: No focal deficit present. Mental Status: She is alert and oriented to person, place, and time. Mental status is at baseline. Cranial Nerves: No cranial nerve deficit. Psychiatric:         Mood and Affect: Mood normal.         Behavior: Behavior normal.         Thought Content: Thought content normal.         Judgment: Judgment normal.       Diagnostic Study Results     Labs -  Recent Results (from the past 24 hour(s))   CBC WITH AUTOMATED DIFF    Collection Time: 01/03/23  2:18 PM   Result Value Ref Range    WBC 8.5 3.6 - 11.0 K/uL    RBC 2.33 (L) 3.80 - 5.20 M/uL    HGB 5.7 (LL) 11.5 - 16.0 g/dL    HCT 18.3 (L) 35.0 - 47.0 %    MCV 78.5 (L) 80.0 - 99.0 FL    MCH 24.5 (L) 26.0 - 34.0 PG    MCHC 31.1 30.0 - 36.5 g/dL    RDW 13.4 11.5 - 14.5 %    PLATELET 040 257 - 733 K/uL    MPV 10.6 8.9 - 12.9 FL    NRBC 0.0 0.0  WBC    ABSOLUTE NRBC 0.00 0.00 - 0.01 K/uL    NEUTROPHILS 69 32 - 75 %    LYMPHOCYTES 23 12 - 49 %    MONOCYTES 7 5 - 13 %    EOSINOPHILS 0 0 - 7 %    BASOPHILS 0 0 - 1 %    IMMATURE GRANULOCYTES 1 (H) 0 - 0.5 %    ABS. NEUTROPHILS 5.9 1.8 - 8.0 K/UL    ABS. LYMPHOCYTES 1.9 0.8 - 3.5 K/UL    ABS. MONOCYTES 0.6 0.0 - 1.0 K/UL    ABS. EOSINOPHILS 0.0 0.0 - 0.4 K/UL    ABS. BASOPHILS 0.0 0.0 - 0.1 K/UL    ABS. IMM.  GRANS. 0.0 0.00 - 0.04 K/UL    DF AUTOMATED     METABOLIC PANEL, COMPREHENSIVE    Collection Time: 01/03/23  2:18 PM   Result Value Ref Range    Sodium 144 136 - 145 mmol/L    Potassium 4.3 3.5 - 5.1 mmol/L    Chloride 108 97 - 108 mmol/L    CO2 27 21 - 32 mmol/L    Anion gap 9 5 - 15 mmol/L    Glucose 185 (H) 65 - 100 mg/dL    BUN 72 (H) 6 - 20 mg/dL    Creatinine 1.86 (H) 0.55 - 1.02 mg/dL    BUN/Creatinine ratio 39 (H) 12 - 20      eGFR 26 (L) >60 ml/min/1.73m2    Calcium 9.2 8.5 - 10.1 mg/dL    Bilirubin, total 0.4 0.2 - 1.0 mg/dL    AST (SGOT) 21 15 - 37 U/L    ALT (SGPT) 18 12 - 78 U/L    Alk. phosphatase 82 45 - 117 U/L    Protein, total 6.0 (L) 6.4 - 8.2 g/dL    Albumin 3.2 (L) 3.5 - 5.0 g/dL    Globulin 2.8 2.0 - 4.0 g/dL    A-G Ratio 1.1 1.1 - 2.2     NT-PRO BNP    Collection Time: 01/03/23  2:18 PM   Result Value Ref Range    NT pro- (H) <450 pg/mL   TROPONIN-HIGH SENSITIVITY    Collection Time: 01/03/23  2:18 PM   Result Value Ref Range    Troponin-High Sensitivity 36 0 - 51 ng/L             Radiologic Studies -   CTA ABDOMEN PELV W CONT    (Results Pending)     CT Results  (Last 48 hours)      None          CXR Results  (Last 48 hours)      None            Medical Decision Making and ED Course   I am the first provider for this patient. I reviewed the vital signs, available nursing notes, past medical history, past surgical history, family history and social history. Vital Signs-Reviewed the patient's vital signs. Patient Vitals for the past 24 hrs:   Temp Pulse Resp BP SpO2   01/03/23 1331 98.1 °F (36.7 °C) 92 17 (!) 147/71 97 %         EKG interpretation:         Records Reviewed: Previous Hospital chart. EMS run report      ED Course:   Initial assessment performed. The patients presenting problems have been discussed, and they are in agreement with the care plan formulated and outlined with them. I have encouraged them to ask questions as they arise throughout their visit. Orders Placed This Encounter    CTA ABDOMEN PELV W CONT     Standing Status:   Standing     Number of Occurrences:   1     Order Specific Question:   Transport     Answer:   BED [2]     Order Specific Question:   Reason for Exam     Answer:   brbpr     Order Specific Question:   Type of Contrast     Answer:   IV     Order Specific Question:   Does patient have history of Renal Disease?      Answer:   No     Order Specific Question:   Oral Contrast     Answer:   Per Radiologist Protocol     Order Specific Question:   Decision Support Exception     Answer:   Emergency Medical Condition (MA) [1]    CBC WITH AUTOMATED DIFF     Standing Status:   Standing     Number of Occurrences:   1    COMPREHENSIVE METABOLIC PANEL     Standing Status:   Standing     Number of Occurrences:   1    PRO-BNP     Standing Status:   Standing     Number of Occurrences:   1    TROPONIN-HIGH SENSITIVITY     Standing Status:   Standing     Number of Occurrences:   1    URINALYSIS W/ RFLX MICROSCOPIC     Standing Status:   Standing     Number of Occurrences:   1    PROTHROMBIN TIME + INR     Standing Status:   Standing     Number of Occurrences:   1    VITAL SIGNS     Standing Status:   Standing     Number of Occurrences:   1    TRANSFUSE PACKED RBC'S, 3 Units     Standing Status:   Standing     Number of Occurrences:   3     Order Specific Question:   Reason for transfusion     Answer:   Hgb LESS THAN 7 g/dL with signs or symptoms of anemia. CARDIAC MONITORING     Standing Status:   Standing     Number of Occurrences:   1     Order Specific Question:   Type: Answer:   Bedside     Order Specific Question:   Patient may go off unit without monitor     Answer:   No    IP CONSULT TO GASTROENTEROLOGY     Standing Status:   Standing     Number of Occurrences:   1     Order Specific Question:   Reason for Consult: Answer:   gib     Order Specific Question:   Did you call or speak to the consulting provider? Answer:   No     Order Specific Question:   Consult To     Answer:   nelson     Order Specific Question:   Schedule When? Answer:   TODAY    EKG, 12 LEAD, INITIAL     Standing Status:   Standing     Number of Occurrences:   1     Order Specific Question:   Reason for Exam:     Answer:   brbpr    TYPE & SCREEN     ENTER SURGERY DATE IF FOR PRE-OP TESTING. Standing Status:   Standing     Number of Occurrences:   1     Order Specific Question:   Has patient been transfused or pregnant in the last 3 mos. ? Answer:   Unknown    TYPE & SCREEN     ENTER SURGERY DATE IF FOR PRE-OP TESTING. Standing Status:   Standing     Number of Occurrences:   1     Order Specific Question:   Has patient been transfused or pregnant in the last 3 mos. ? Answer:   Unknown    RBC, ALLOCATE, 3 Units Date needed for transfusion: 1/3/2023     Standing Status:   Standing     Number of Occurrences:   1     Order Specific Question:   Date needed for transfusion     Answer:   1/3/2023    SALINE LOCK IV ONE TIME STAT     Standing Status:   Standing     Number of Occurrences:   1    pantoprazole (PROTONIX) 40 mg in 0.9% sodium chloride 10 mL injection     Order Specific Question:   PPI INDICATION     Answer:   GI Bleed    0.9% sodium chloride infusion    0.9% sodium chloride infusion 250 mL    INITIAL PHYSICIAN ORDER: INPATIENT Telemetry; Yes; 3. Patient receiving treatment that can only be provided in an inpatient setting (further clarification in H&P documentation)     Standing Status:   Standing     Number of Occurrences:   1     Order Specific Question:   Status: Answer:   INPATIENT [101]     Order Specific Question:   Type of Bed     Answer:   Telemetry [19]     Order Specific Question:   Cardiac Monitoring Required? Answer:   Yes     Order Specific Question:   Inpatient Hospitalization Certified Necessary for the Following Reasons     Answer:   3. Patient receiving treatment that can only be provided in an inpatient setting (further clarification in H&P documentation)     Order Specific Question:   Admitting Diagnosis     Answer:   GI bleed [[id number]]     Order Specific Question:   Admitting Physician     Answer:   Adrianne Alvarez [[id number]]     Order Specific Question:   Attending Physician     Answer:   Endy Reyna     Order Specific Question:   Estimated Length of Stay     Answer:   2 Midnights     Order Specific Question:   Discharge Plan:     Answer:   Home with Office Follow-up                 Provider Notes (Medical Decision Making):   80year-old presents with bright red blood per rectum. Differential diagnosis includes anemia, hemorrhoids, anal fissure, diverticulosis versus mass malignancy. Significantly anemia requiring a blood transfusion patient typed and crossed for 3 units of blood. Will admit to telemetry as she received a blood transfusion. GI bleed etiology unclear. Patient is unable to obtain a CT secondary to renal insufficiency. GI has been consulted. Patient is n.p.o. and receiving Protonix. VSS    Patient Vitals for the past 8 hrs:   Temp Pulse Resp BP SpO2   01/03/23 1331 98.1 °F (36.7 °C) 92 17 (!) 147/71 97 %           ED Course as of 01/03/23 1511   Tue Jan 03, 2023   1438 EKG at 1427. Irregular irregular intervals. Atrial fibrillation with PVC rate of 85. No ST changes. No T wave inversions. Reasonable dysrhythmia. Interpreted by ER physician. [HP]   1507 HGB(!!): 5.7  Significant anemia requiring a blood transfusion. [HP]   1507 Creatinine(!): 1.86  Patient does have renal insufficiency with an elevated BUN to suggest to the elevated acute GI bleed on top of it. [HP]   1507 BUN(!): 72 [HP]   1507 Sodium: 144 [HP]   1507 CO2: 27 [HP]   1508 Potassium: 4.3  Normal electrolytes otherwise. [HP]   9297 NT pro-BNP(!): 535  No evidence of significant fluid overload that would compromise a blood transfusion. [HP]   1508 Troponin-High Sensitivity: 36  No Evidence of an NSTEMI suggested by acute blood loss.  [HP]      ED Course User Index  [HP] Qian Prado MD       admit    Consults    Giles Reynoso admit          Admitted    Procedures         CRITICAL CARE NOTE :  3:10 PM  Amount of Critical Care Time: 30 minutes    IMPENDING DETERIORATION -Airway, Respiratory, Cardiovascular, CNS, and Metabolic  ASSOCIATED RISK FACTORS - Bleeding  MANAGEMENT- Bedside Assessment and Supervision of Care  INTERPRETATION -  Xrays and Blood Pressure  INTERVENTIONS - hemodynamic mngmt  CASE REVIEW - Medical Sub-Specialist  TREATMENT RESPONSE -Stable  PERFORMED BY - Self    NOTES :  I have spent critical care time involved in lab review, consultations with specialist, family decision- making, bedside attention and documentation. This time excludes time spent in any separate billed procedures. During this entire length of time I was immediately available to the patient . Barry Maki MD          Disposition       Emergency Department Disposition:  Admitted      Diagnosis     Clinical Impression:   1. Anemia, unspecified type    2. Gastrointestinal hemorrhage, unspecified gastrointestinal hemorrhage type    3. Renal insufficiency        Attestations:    Barry Maki MD    Please note that this dictation was completed with [employer], the computer voice recognition software. Quite often unanticipated grammatical, syntax, homophones, and other interpretive errors are inadvertently transcribed by the computer software. Please disregard these errors. Please excuse any errors that have escaped final proofreading. Thank you.

## 2023-01-03 NOTE — Clinical Note
Status[de-identified] INPATIENT [101]   Type of Bed: Telemetry [19]   Cardiac Monitoring Required?: Yes   Inpatient Hospitalization Certified Necessary for the Following Reasons: 3.  Patient receiving treatment that can only be provided in an inpatient setting (further clarification in H&P documentation)   Admitting Diagnosis: GI bleed [[id number]]   Admitting Physician: Ashley Conn [[id number]]   Attending Physician: Ashley Conn [[id number]]   Estimated Length of Stay: 2 Midnights   Discharge Plan[de-identified] Home with Office Follow-up

## 2023-01-03 NOTE — H&P
History and Physical    NAME: Kellee Matias   :  1935   MRN:  [id number]     Date/Time:  1/3/2023 3:59 PM    Patient PCP: Charis Mayo MD  ______________________________________________________________________             Subjective:     CHIEF COMPLAINT: blood in stool        HISTORY OF PRESENT ILLNESS:       Patient is a 80y.o. year old female with PMH of HTN, CHF, chronic A. fib CKI stage 3b, and type II DM presents to ED with complaint of bloody stool that began . He is on Eliquis for A. fib patient experiences bloody stool whenever she attempts to eat or drink water. She states she has not been able to eat since . The blood is described as a mix of dark and bright red at different times. Patient had a colonoscopy done >5 years ago which did not reveal any abnormalities. Per daughter, Tom Alvarez, last meal patient had was shrimp fried rice from unknown restaurant. Patient denies fever and abdominal pain. Only stated pain is in her right ankle, which she sprained. Hgb 5.7 in ED. Blood type O positive. Past Medical History:   Diagnosis Date    CHF (congestive heart failure) (HCC)     Diabetes ([address]     Hypertension     Kidney disease         Past Surgical History:   Procedure Laterality Date    HX BREAST BIOPSY         Social History     Tobacco Use    Smoking status: Never    Smokeless tobacco: Never   Substance Use Topics    Alcohol use: No        Family History   Problem Relation Age of Onset    Cancer Mother     Heart Disease Mother        No Known Allergies     Prior to Admission medications    Medication Sig Start Date End Date Taking? Authorizing Provider   chlorthalidone (HYGROTON) 25 mg tablet TAKE ONE TABLET BY MOUTH ONCE DAILY 22   Charis Mayo MD   glyBURIDE (DIABETA) 2.5 mg tablet Take 1 Tablet by mouth Daily (before breakfast).  10/14/22   Charis Mayo MD   glucose blood VI test strips (blood glucose test) strip E11.9 check blood sugar daily 10/14/22   Angelique Garcia Rick Infante MD   Blood-Glucose Meter (Blood Glucose Monitoring) monitoring kit E11.9 check blood sugar daily 10/14/22   Luis Virgen MD   lancets misc E11.9  check blood sugar daily 10/14/22   Luis Virgen MD   aspirin delayed-release 81 mg tablet Take 1 Tablet by mouth daily. 10/6/22   Luis Virgen MD   Eliquis 5 mg tablet TAKE ONE TABLET BY MOUTH TWICE A DAY. 5/17/22   Luis Virgen MD   amLODIPine (NORVASC) 5 mg tablet TAKE ONE (1) TABLET BY MOUTH TWICE DAILY 11/10/21   Luis Virgen MD   ferrous sulfate (IRON) 325 mg (65 mg iron) EC tablet TAKE 1 TABLET TWICE DAILY 11/8/20   Luis Virgen MD   sodium bicarbonate 325 mg tablet Take 325 mg by mouth two (2) times a day. Provider, Historical   cetirizine (ZYRTEC) 10 mg tablet TAKE (1) TABLET DAILY. 9/29/20   Luis Virgen MD   losartan (COZAAR) 100 mg tablet Take 1 Tab by mouth daily. Patient not taking: No sig reported 3/7/20   Luis Virgen MD   carvedilol (COREG) 12.5 mg tablet Take 1.5 Tabs by mouth two (2) times a day. 2/22/19   Provider, Historical   hydrALAZINE (APRESOLINE) 25 mg tablet Take 1 Tab by mouth two (2) times a day. 2/20/19   Provider, Historical   amiodarone (CORDARONE) 200 mg tablet Take 200 mg by mouth daily. Provider, Historical   magnesium oxide (MAG-OX) 400 mg tablet Take 400 mg by mouth daily.     Provider, Historical         Current Facility-Administered Medications:     pantoprazole (PROTONIX) 40 mg in 0.9% sodium chloride 10 mL injection, 40 mg, IntraVENous, NOW, Maddie Reynoso MD    0.9% sodium chloride infusion, 100 mL/hr, IntraVENous, ONCE, Maddie Reynoso MD    0.9% sodium chloride infusion 250 mL, 250 mL, IntraVENous, PRN, Maddie Reynoso MD    [START ON 1/4/2023] amiodarone (CORDARONE) tablet 200 mg, 200 mg, Oral, DAILY, Maddie Reynoso MD    [START ON 1/4/2023] amLODIPine (NORVASC) tablet 5 mg, 5 mg, Oral, DAILY, Maddie Reynoso MD    carvediloL (COREG) tablet 18.75 mg, 18.75 mg, Oral, BID, Leena Reynoso, MD    .PHARMACY TO SUBSTITUTE PER PROTOCOL (Reordered from: ferrous sulfate (IRON) 325 mg (65 mg iron) EC tablet), , , Per Protocol, Chun Alarcon MD    hydrALAZINE (APRESOLINE) tablet 25 mg, 25 mg, Oral, BID, Maddie Reynoso MD    sodium bicarbonate tablet 325 mg, 325 mg, Oral, BID, Marialuisa Reynoso MD    insulin lispro (HUMALOG) injection, , SubCUTAneous, AC&HS, Marialuisa Reynoso MD    glucose chewable tablet 16 g, 4 Tablet, Oral, PRN, Maddie Reynoso MD    glucagon (GLUCAGEN) injection 1 mg, 1 mg, IntraMUSCular, PRN, Chun Alarcon MD    0.9% sodium chloride infusion, 75 mL/hr, IntraVENous, CONTINUOUS, Marialuisa Reynoso MD    acetaminophen (TYLENOL) tablet 650 mg, 650 mg, Oral, Q6H PRN **OR** acetaminophen (TYLENOL) suppository 650 mg, 650 mg, Rectal, Q6H PRN, Maddie Reynoso MD    polyethylene glycol (MIRALAX) packet 17 g, 17 g, Oral, DAILY PRN, Maddie Reynoso MD    ondansetron (ZOFRAN ODT) tablet 4 mg, 4 mg, Oral, Q8H PRN **OR** ondansetron (ZOFRAN) injection 4 mg, 4 mg, IntraVENous, Q6H PRN, Maddie Reynoso MD    pantoprazole (PROTONIX) 40 mg in 0.9% sodium chloride 10 mL injection, 40 mg, IntraVENous, Q12H, Maddie Reynoso MD    Current Outpatient Medications:     chlorthalidone (HYGROTON) 25 mg tablet, TAKE ONE TABLET BY MOUTH ONCE DAILY, Disp: 90 Tablet, Rfl: 1    glyBURIDE (DIABETA) 2.5 mg tablet, Take 1 Tablet by mouth Daily (before breakfast). , Disp: 90 Tablet, Rfl: 1    glucose blood VI test strips (blood glucose test) strip, E11.9 check blood sugar daily, Disp: 100 Strip, Rfl: 3    Blood-Glucose Meter (Blood Glucose Monitoring) monitoring kit, E11.9 check blood sugar daily, Disp: 1 Kit, Rfl: 0    lancets misc, E11.9  check blood sugar daily, Disp: 100 Each, Rfl: 3    aspirin delayed-release 81 mg tablet, Take 1 Tablet by mouth daily. , Disp: 90 Tablet, Rfl: 2    Eliquis 5 mg tablet, TAKE ONE TABLET BY MOUTH TWICE A DAY., Disp: 180 Tablet, Rfl: 0    amLODIPine (NORVASC) 5 mg tablet, TAKE ONE (1) TABLET BY MOUTH TWICE DAILY, Disp: 180 Tablet, Rfl: 1    ferrous sulfate (IRON) 325 mg (65 mg iron) EC tablet, TAKE 1 TABLET TWICE DAILY, Disp: 180 Tab, Rfl: 0    sodium bicarbonate 325 mg tablet, Take 325 mg by mouth two (2) times a day., Disp: , Rfl:     cetirizine (ZYRTEC) 10 mg tablet, TAKE (1) TABLET DAILY. , Disp: 30 Tab, Rfl: 0    losartan (COZAAR) 100 mg tablet, Take 1 Tab by mouth daily. (Patient not taking: No sig reported), Disp: 90 Tab, Rfl: 3    carvedilol (COREG) 12.5 mg tablet, Take 1.5 Tabs by mouth two (2) times a day., Disp: , Rfl:     hydrALAZINE (APRESOLINE) 25 mg tablet, Take 1 Tab by mouth two (2) times a day., Disp: , Rfl:     amiodarone (CORDARONE) 200 mg tablet, Take 200 mg by mouth daily. , Disp: , Rfl:     magnesium oxide (MAG-OX) 400 mg tablet, Take 400 mg by mouth daily. , Disp: , Rfl:     LAB DATA REVIEWED:    Recent Results (from the past 24 hour(s))   CBC WITH AUTOMATED DIFF    Collection Time: 01/03/23  2:18 PM   Result Value Ref Range    WBC 8.5 3.6 - 11.0 K/uL    RBC 2.33 (L) 3.80 - 5.20 M/uL    HGB 5.7 (LL) 11.5 - 16.0 g/dL    HCT 18.3 (L) 35.0 - 47.0 %    MCV 78.5 (L) 80.0 - 99.0 FL    MCH 24.5 (L) 26.0 - 34.0 PG    MCHC 31.1 30.0 - 36.5 g/dL    RDW 13.4 11.5 - 14.5 %    PLATELET 433 838 - 515 K/uL    MPV 10.6 8.9 - 12.9 FL    NRBC 0.0 0.0  WBC    ABSOLUTE NRBC 0.00 0.00 - 0.01 K/uL    NEUTROPHILS 69 32 - 75 %    LYMPHOCYTES 23 12 - 49 %    MONOCYTES 7 5 - 13 %    EOSINOPHILS 0 0 - 7 %    BASOPHILS 0 0 - 1 %    IMMATURE GRANULOCYTES 1 (H) 0 - 0.5 %    ABS. NEUTROPHILS 5.9 1.8 - 8.0 K/UL    ABS. LYMPHOCYTES 1.9 0.8 - 3.5 K/UL    ABS. MONOCYTES 0.6 0.0 - 1.0 K/UL    ABS. EOSINOPHILS 0.0 0.0 - 0.4 K/UL    ABS. BASOPHILS 0.0 0.0 - 0.1 K/UL    ABS. IMM.  GRANS. 0.0 0.00 - 0.04 K/UL    DF AUTOMATED     TYPE & SCREEN    Collection Time: 01/03/23  2:18 PM   Result Value Ref Range    Crossmatch Expiration 01/06/2023,2359     ABO/Rh(D) O Positive     Antibody screen Negative     Unit number [id number]     Blood component type  LR     Unit division 00     Status of unit Allocated     TRANSFUSION STATUS Ok to transfuse     Crossmatch result Compatible     Unit number [id number]     Blood component type  LR     Unit division 00     Status of unit Allocated     TRANSFUSION STATUS Ok to transfuse     Crossmatch result Compatible     Unit number [id number]     Blood component type  LR     Unit division 00     Status of unit Allocated     TRANSFUSION STATUS Ok to transfuse     Crossmatch result Compatible    METABOLIC PANEL, COMPREHENSIVE    Collection Time: 01/03/23  2:18 PM   Result Value Ref Range    Sodium 144 136 - 145 mmol/L    Potassium 4.3 3.5 - 5.1 mmol/L    Chloride 108 97 - 108 mmol/L    CO2 27 21 - 32 mmol/L    Anion gap 9 5 - 15 mmol/L    Glucose 185 (H) 65 - 100 mg/dL    BUN 72 (H) 6 - 20 mg/dL    Creatinine 1.86 (H) 0.55 - 1.02 mg/dL    BUN/Creatinine ratio 39 (H) 12 - 20      eGFR 26 (L) >60 ml/min/1.73m2    Calcium 9.2 8.5 - 10.1 mg/dL    Bilirubin, total 0.4 0.2 - 1.0 mg/dL    AST (SGOT) 21 15 - 37 U/L    ALT (SGPT) 18 12 - 78 U/L    Alk.  phosphatase 82 45 - 117 U/L    Protein, total 6.0 (L) 6.4 - 8.2 g/dL    Albumin 3.2 (L) 3.5 - 5.0 g/dL    Globulin 2.8 2.0 - 4.0 g/dL    A-G Ratio 1.1 1.1 - 2.2     NT-PRO BNP    Collection Time: 01/03/23  2:18 PM   Result Value Ref Range    NT pro- (H) <450 pg/mL   TROPONIN-HIGH SENSITIVITY    Collection Time: 01/03/23  2:18 PM   Result Value Ref Range    Troponin-High Sensitivity 36 0 - 51 ng/L   EKG, 12 LEAD, INITIAL    Collection Time: 01/03/23  2:27 PM   Result Value Ref Range    Ventricular Rate 85 BPM    Atrial Rate 110 BPM    QRS Duration 134 ms    Q-T Interval 420 ms    QTC Calculation (Bezet) 499 ms    Calculated R Axis -19 degrees    Calculated T Axis 25 degrees    Diagnosis       Atrial fibrillation with premature ventricular or aberrantly conducted   complexes  Right bundle branch block  Abnormal ECG  No previous ECGs available  Confirmed by [facility] MD, Vimal Hendricks ([id number]) on 1/3/2023 4:24:42 PM     URINALYSIS W/ RFLX MICROSCOPIC    Collection Time: 01/03/23  2:52 PM   Result Value Ref Range    Color Yellow/Straw      Appearance Clear Clear      Specific gravity 1.018 1.003 - 1.030      pH (UA) 5.0 5.0 - 8.0      Protein Negative Negative mg/dL    Glucose Negative Negative mg/dL    Ketone Negative Negative mg/dL    Bilirubin Negative Negative      Blood Negative Negative      Urobilinogen 0.1 0.1 - 1.0 EU/dL    Nitrites Negative Negative      Leukocyte Esterase Small (A) Negative     URINE MICROSCOPIC    Collection Time: 01/03/23  2:52 PM   Result Value Ref Range    WBC 5-10 0 - 4 /hpf    RBC 0-5 0 - 5 /hpf    Epithelial cells Few Few /lpf    Bacteria Negative Negative /hpf    Hyaline cast 2-5 0 - 5 /lpf   PROTHROMBIN TIME + INR    Collection Time: 01/03/23  3:14 PM   Result Value Ref Range    Prothrombin time 14.8 (H) 11.9 - 14.6 sec    INR 1.1 0.9 - 1.1         XR Results (most recent):  Results from Hospital Encounter encounter on 01/02/23    XR ANKLE RT MIN 3 V    Narrative  EXAM: XR ANKLE RT MIN 3 V    INDICATION: pain s/p GLF    COMPARISON: None. FINDINGS: Three views of the right ankle. The ankle mortise is intact. The talar  dome is intact. There is a small osteophyte in the anterior tibial plafond and. There is a plantar calcaneal enthesophyte. No significant tibiotalar joint  effusion. There is mild soft tissue swelling of the lateral side the ankle. .  Atherosclerotic calcifications are present. Impression  Mild soft tissue swelling along lateral side of the ankle. CT ABD PELV WO CONT    (Results Pending)        Review of Systems:  Constitutional: Negative for chills and fever. HENT: Negative. Eyes: Negative. Respiratory: Negative. Cardiovascular: Negative. Gastrointestinal: Negative for abdominal pain and nausea. Positive for blood in stool  Skin: Negative.     Neurological: Negative. Objective:   VITALS:    Visit Vitals  BP (!) 147/71   Pulse 92   Temp 98.1 °F (36.7 °C)   Resp 17   Ht 4' 11\" (1.499 m)   Wt 61.7 kg (136 lb)   SpO2 97%   BMI 27.47 kg/m²       Physical Exam:   Constitutional: pt is oriented to person, place, and time. HENT:   Head: Normocephalic and atraumatic. Eyes: Pupils are equal, round, and reactive to light. EOM are normal.   Cardiovascular: Normal rate, regular rhythm and normal heart sounds. Pulmonary/Chest: Breath sounds normal. No wheezes. No rales. Exhibits no tenderness. Abdominal: Soft. Bowel sounds are normal. There is no abdominal tenderness. There is no rebound and no guarding. Musculoskeletal: Normal range of motion. Neurological: pt is alert and oriented to person, place, and time. Alert. Normal strength. No cranial nerve deficit or sensory deficit. Displays a negative Romberg sign. ASSESSMENT & PLAN:  Acute GI bleed of unspecified location  Discontinue Eliquis.   CT of abd wo contrast   Acute blood  loss anemia  Hgb 5.7  Transfuse  3 units packed RBC  CKD stage 3  Possibly progressed to stage 4   Or SRINIVASA on CKD   On sodium bicarb  Diabetes  Blood glucose 185  Per Pt, she is not currently on medication  Consider adding sliding scale insulin  CHF    HTN  Continue home medications    Consult with GI  Continue with 3 units packed RBC's transfusion  Discontinue eliquis  Patient NPO       Current Facility-Administered Medications:     0.9% sodium chloride infusion 250 mL, 250 mL, IntraVENous, PRN, Amanda Reynoso MD    [START ON 1/4/2023] amiodarone (CORDARONE) tablet 200 mg, 200 mg, Oral, DAILY, Amanda Reynoso MD    [START ON 1/4/2023] amLODIPine (NORVASC) tablet 5 mg, 5 mg, Oral, DAILY, Maddie Reynoso MD    carvediloL (COREG) tablet 18.75 mg, 18.75 mg, Oral, BID, Maddie Reynoso MD    .PHARMACY TO SUBSTITUTE PER PROTOCOL (Reordered from: ferrous sulfate (IRON) 325 mg (65 mg iron) EC tablet), , , Per Protocol, Fatimah Dupont MD    hydrALAZINE (APRESOLINE) tablet 25 mg, 25 mg, Oral, BID, Radha Reynoso MD    sodium bicarbonate tablet 325 mg, 325 mg, Oral, BID, Radha Reynoso MD    insulin lispro (HUMALOG) injection, , SubCUTAneous, AC&HS, Radha Reynoso MD    glucose chewable tablet 16 g, 4 Tablet, Oral, PRN, Radha Reynoso MD    glucagon (GLUCAGEN) injection 1 mg, 1 mg, IntraMUSCular, PRN, Fatimah Dupont MD    0.9% sodium chloride infusion, 75 mL/hr, IntraVENous, CONTINUOUS, Radha Reynoso MD    acetaminophen (TYLENOL) tablet 650 mg, 650 mg, Oral, Q6H PRN **OR** acetaminophen (TYLENOL) suppository 650 mg, 650 mg, Rectal, Q6H PRN, Maddie Reynoso MD    polyethylene glycol (MIRALAX) packet 17 g, 17 g, Oral, DAILY PRN, Maddie Reynoso MD    ondansetron (ZOFRAN ODT) tablet 4 mg, 4 mg, Oral, Q8H PRN **OR** ondansetron (ZOFRAN) injection 4 mg, 4 mg, IntraVENous, Q6H PRN, Maddie Reynoso MD    pantoprazole (PROTONIX) 40 mg in 0.9% sodium chloride 10 mL injection, 40 mg, IntraVENous, Q12H, Maddie Reynoso MD    Current Outpatient Medications:     chlorthalidone (HYGROTON) 25 mg tablet, TAKE ONE TABLET BY MOUTH ONCE DAILY, Disp: 90 Tablet, Rfl: 1    glyBURIDE (DIABETA) 2.5 mg tablet, Take 1 Tablet by mouth Daily (before breakfast). , Disp: 90 Tablet, Rfl: 1    glucose blood VI test strips (blood glucose test) strip, E11.9 check blood sugar daily, Disp: 100 Strip, Rfl: 3    Blood-Glucose Meter (Blood Glucose Monitoring) monitoring kit, E11.9 check blood sugar daily, Disp: 1 Kit, Rfl: 0    lancets misc, E11.9  check blood sugar daily, Disp: 100 Each, Rfl: 3    aspirin delayed-release 81 mg tablet, Take 1 Tablet by mouth daily. , Disp: 90 Tablet, Rfl: 2    Eliquis 5 mg tablet, TAKE ONE TABLET BY MOUTH TWICE A DAY., Disp: 180 Tablet, Rfl: 0    amLODIPine (NORVASC) 5 mg tablet, TAKE ONE (1) TABLET BY MOUTH TWICE DAILY, Disp: 180 Tablet, Rfl: 1    ferrous sulfate (IRON) 325 mg (65 mg iron) EC tablet, TAKE 1 TABLET TWICE DAILY, Disp: 180 Tab, Rfl: 0    sodium bicarbonate 325 mg tablet, Take 325 mg by mouth two (2) times a day., Disp: , Rfl:     cetirizine (ZYRTEC) 10 mg tablet, TAKE (1) TABLET DAILY. , Disp: 30 Tab, Rfl: 0    losartan (COZAAR) 100 mg tablet, Take 1 Tab by mouth daily. (Patient not taking: No sig reported), Disp: 90 Tab, Rfl: 3    carvedilol (COREG) 12.5 mg tablet, Take 1.5 Tabs by mouth two (2) times a day., Disp: , Rfl:     hydrALAZINE (APRESOLINE) 25 mg tablet, Take 1 Tab by mouth two (2) times a day., Disp: , Rfl:     amiodarone (CORDARONE) 200 mg tablet, Take 200 mg by mouth daily. , Disp: , Rfl:     magnesium oxide (MAG-OX) 400 mg tablet, Take 400 mg by mouth daily. , Disp: , Rfl:    ________________________________________________________________________    Signed: Raina Zuleta MD

## 2023-01-04 LAB
ALBUMIN SERPL-MCNC: 3 G/DL (ref 3.5–5)
ALBUMIN/GLOB SERPL: 0.9 (ref 1.1–2.2)
ALP SERPL-CCNC: 73 U/L (ref 45–117)
ALT SERPL-CCNC: 17 U/L (ref 12–78)
ANION GAP SERPL CALC-SCNC: 7 MMOL/L (ref 5–15)
AST SERPL W P-5'-P-CCNC: 16 U/L (ref 15–37)
BASOPHILS # BLD: 0.1 K/UL (ref 0–0.1)
BASOPHILS NFR BLD: 1 % (ref 0–1)
BILIRUB SERPL-MCNC: 0.6 MG/DL (ref 0.2–1)
BUN SERPL-MCNC: 71 MG/DL (ref 6–20)
BUN/CREAT SERPL: 41 (ref 12–20)
CA-I BLD-MCNC: 8.7 MG/DL (ref 8.5–10.1)
CHLORIDE SERPL-SCNC: 111 MMOL/L (ref 97–108)
CO2 SERPL-SCNC: 27 MMOL/L (ref 21–32)
CREAT SERPL-MCNC: 1.74 MG/DL (ref 0.55–1.02)
DIFFERENTIAL METHOD BLD: ABNORMAL
EOSINOPHIL # BLD: 0.1 K/UL (ref 0–0.4)
EOSINOPHIL NFR BLD: 1 % (ref 0–7)
ERYTHROCYTE [DISTWIDTH] IN BLOOD BY AUTOMATED COUNT: 14.9 % (ref 11.5–14.5)
GLOBULIN SER CALC-MCNC: 3.2 G/DL (ref 2–4)
GLUCOSE BLD STRIP.AUTO-MCNC: 101 MG/DL (ref 65–100)
GLUCOSE BLD STRIP.AUTO-MCNC: 131 MG/DL (ref 65–100)
GLUCOSE BLD STRIP.AUTO-MCNC: 133 MG/DL (ref 65–100)
GLUCOSE BLD STRIP.AUTO-MCNC: 153 MG/DL (ref 65–100)
GLUCOSE SERPL-MCNC: 106 MG/DL (ref 65–100)
HCT VFR BLD AUTO: 25.4 % (ref 35–47)
HGB BLD-MCNC: 8 G/DL (ref 11.5–16)
IMM GRANULOCYTES # BLD AUTO: 0 K/UL (ref 0–0.04)
IMM GRANULOCYTES NFR BLD AUTO: 0 % (ref 0–0.5)
LYMPHOCYTES # BLD: 2.8 K/UL (ref 0.8–3.5)
LYMPHOCYTES NFR BLD: 25 % (ref 12–49)
MCH RBC QN AUTO: 25.4 PG (ref 26–34)
MCHC RBC AUTO-ENTMCNC: 31.5 G/DL (ref 30–36.5)
MCV RBC AUTO: 80.6 FL (ref 80–99)
MONOCYTES # BLD: 1 K/UL (ref 0–1)
MONOCYTES NFR BLD: 9 % (ref 5–13)
NEUTS SEG # BLD: 6.9 K/UL (ref 1.8–8)
NEUTS SEG NFR BLD: 64 % (ref 32–75)
NRBC # BLD: 0 K/UL (ref 0–0.01)
NRBC BLD-RTO: 0 PER 100 WBC
PERFORMED BY, TECHID: ABNORMAL
PLATELET # BLD AUTO: 164 K/UL (ref 150–400)
PMV BLD AUTO: 10.9 FL (ref 8.9–12.9)
POTASSIUM SERPL-SCNC: 3.7 MMOL/L (ref 3.5–5.1)
PROT SERPL-MCNC: 6.2 G/DL (ref 6.4–8.2)
RBC # BLD AUTO: 3.15 M/UL (ref 3.8–5.2)
SODIUM SERPL-SCNC: 145 MMOL/L (ref 136–145)
WBC # BLD AUTO: 10.9 K/UL (ref 3.6–11)

## 2023-01-04 PROCEDURE — 74011636637 HC RX REV CODE- 636/637: Performed by: FAMILY MEDICINE

## 2023-01-04 PROCEDURE — C9113 INJ PANTOPRAZOLE SODIUM, VIA: HCPCS | Performed by: FAMILY MEDICINE

## 2023-01-04 PROCEDURE — P9016 RBC LEUKOCYTES REDUCED: HCPCS

## 2023-01-04 PROCEDURE — 65270000029 HC RM PRIVATE

## 2023-01-04 PROCEDURE — 85025 COMPLETE CBC W/AUTO DIFF WBC: CPT

## 2023-01-04 PROCEDURE — 36430 TRANSFUSION BLD/BLD COMPNT: CPT

## 2023-01-04 PROCEDURE — 74011250637 HC RX REV CODE- 250/637: Performed by: FAMILY MEDICINE

## 2023-01-04 PROCEDURE — 74011250636 HC RX REV CODE- 250/636: Performed by: FAMILY MEDICINE

## 2023-01-04 PROCEDURE — 80053 COMPREHEN METABOLIC PANEL: CPT

## 2023-01-04 PROCEDURE — 74011000250 HC RX REV CODE- 250: Performed by: FAMILY MEDICINE

## 2023-01-04 PROCEDURE — 36415 COLL VENOUS BLD VENIPUNCTURE: CPT

## 2023-01-04 PROCEDURE — 82962 GLUCOSE BLOOD TEST: CPT

## 2023-01-04 RX ADMIN — FERROUS SULFATE TAB 325 MG (65 MG ELEMENTAL FE) 325 MG: 325 (65 FE) TAB at 21:38

## 2023-01-04 RX ADMIN — HYDRALAZINE HYDROCHLORIDE 25 MG: 25 TABLET, FILM COATED ORAL at 21:38

## 2023-01-04 RX ADMIN — HYDRALAZINE HYDROCHLORIDE 25 MG: 25 TABLET, FILM COATED ORAL at 08:56

## 2023-01-04 RX ADMIN — SODIUM CHLORIDE 75 ML/HR: 9 INJECTION, SOLUTION INTRAVENOUS at 18:08

## 2023-01-04 RX ADMIN — SODIUM CHLORIDE, PRESERVATIVE FREE 40 MG: 5 INJECTION INTRAVENOUS at 21:38

## 2023-01-04 RX ADMIN — INSULIN LISPRO 3 UNITS: 100 INJECTION, SOLUTION INTRAVENOUS; SUBCUTANEOUS at 08:00

## 2023-01-04 RX ADMIN — SODIUM BICARBONATE 325 MG: 650 TABLET ORAL at 00:39

## 2023-01-04 RX ADMIN — SODIUM BICARBONATE 325 MG: 650 TABLET ORAL at 21:38

## 2023-01-04 RX ADMIN — HYDRALAZINE HYDROCHLORIDE 25 MG: 25 TABLET, FILM COATED ORAL at 00:39

## 2023-01-04 RX ADMIN — SODIUM BICARBONATE 325 MG: 650 TABLET ORAL at 08:56

## 2023-01-04 RX ADMIN — AMLODIPINE BESYLATE 5 MG: 5 TABLET ORAL at 08:56

## 2023-01-04 RX ADMIN — SODIUM CHLORIDE, PRESERVATIVE FREE 40 MG: 5 INJECTION INTRAVENOUS at 08:55

## 2023-01-04 RX ADMIN — SODIUM CHLORIDE, PRESERVATIVE FREE 40 MG: 5 INJECTION INTRAVENOUS at 00:38

## 2023-01-04 NOTE — ACP (ADVANCE CARE PLANNING)
Advance Care Planning   Healthcare Decision Maker:       Primary Decision Maker: Aayush Campbell - [facility] - [phone]

## 2023-01-04 NOTE — PROGRESS NOTES
Reason for Admission:   GIB                    RUR Score:   15%               PCP: First and Last name:   Martin Ashraf MD     Name of Practice:    Are you a current patient: Yes/No: Yes   Approximate date of last visit: Seen in October. Can you participate in a virtual visit if needed: Yes/Call. Do you (patient/family) have any concerns for transition/discharge? No               Plan for utilizing home health:  None @ this time/uses no DME. Current Advanced Directive/Advance Care Plan:  Full Code      Healthcare Decision Maker:             Primary Decision Maker: Yanet Saez - Daughter - [phone]    Transition of Care Plan:        D/C Plan is home alone & daughter to transport. Send Rxs to [facility] upon discharge.

## 2023-01-04 NOTE — MED STUDENT NOTES
*ATTENTION:  This note has been created by a medical student for educational purposes only. Please do not refer to the content of this note for clinical decision-making, billing, or other purposes. Please see attending physicians note to obtain clinical information on this patient. *     Gastroenterology Consult     Referring Physician: Karolina Nieto MD     Consult Date: 1/4/2023     Subjective:     Chief Complaint: rectal bleeding    History of Present Illness: Angela Perez is a 80 y.o. female with history of hypertension, CHF, atrial fibrillation on Eliquis, CKD stage 3b, and type 2 diabetes mellitus. On 1/3/2023, patient presented to ED for 2 days of rectal bleeding, sudden onset, described as a mix of bright and dark red blood. No abdominal pain, nausea, or vomiting. No history of similar. Last colonoscopy ~5 years ago, cannot recall anything abnormal. ED workup significant for anemia (Hgb 5.7) and elevated BUN. CT abdomen without contrast showed sigmoid diverticulosis. Admitted for further evaluation and management of rectal bleeding    She is seen in consultation for rectal bleeding.    ----------    Patient is seen and examined in ED. She is sitting up in bed on room air. Daughter at the bedside. Patient had just had a bowel movement with darker red blood. No abdominal pain, nausea, or vomiting. No history of similar. Denies fever, chills, cough, chest pain, shortness of breath, or changes in bladder habits.        Past Medical History:   Diagnosis Date    CHF (congestive heart failure) ([facility] [address]     Diabetes ([facility] [address]     Hypertension     Kidney disease      Past Surgical History:   Procedure Laterality Date    HX BREAST BIOPSY        Family History   Problem Relation Age of Onset    Cancer Mother     Heart Disease Mother      Social History     Tobacco Use    Smoking status: Never    Smokeless tobacco: Never   Substance Use Topics    Alcohol use: No      No Known Allergies  Current Facility-Administered Medications   Medication Dose Route Frequency    0.9% sodium chloride infusion 250 mL  250 mL IntraVENous PRN    amiodarone (CORDARONE) tablet 200 mg  200 mg Oral DAILY    amLODIPine (NORVASC) tablet 5 mg  5 mg Oral DAILY    carvediloL (COREG) tablet 18.75 mg  18.75 mg Oral BID    . PHARMACY TO SUBSTITUTE PER PROTOCOL (Reordered from: ferrous sulfate (IRON) 325 mg (65 mg iron) EC tablet)    Per Protocol    hydrALAZINE (APRESOLINE) tablet 25 mg  25 mg Oral BID    sodium bicarbonate tablet 325 mg  325 mg Oral BID    insulin lispro (HUMALOG) injection   SubCUTAneous AC&HS    glucose chewable tablet 16 g  4 Tablet Oral PRN    glucagon (GLUCAGEN) injection 1 mg  1 mg IntraMUSCular PRN    0.9% sodium chloride infusion  75 mL/hr IntraVENous CONTINUOUS    acetaminophen (TYLENOL) tablet 650 mg  650 mg Oral Q6H PRN    Or    acetaminophen (TYLENOL) suppository 650 mg  650 mg Rectal Q6H PRN    polyethylene glycol (MIRALAX) packet 17 g  17 g Oral DAILY PRN    ondansetron (ZOFRAN ODT) tablet 4 mg  4 mg Oral Q8H PRN    Or    ondansetron (ZOFRAN) injection 4 mg  4 mg IntraVENous Q6H PRN    pantoprazole (PROTONIX) 40 mg in 0.9% sodium chloride 10 mL injection  40 mg IntraVENous Q12H     Current Outpatient Medications   Medication Sig    chlorthalidone (HYGROTON) 25 mg tablet TAKE ONE TABLET BY MOUTH ONCE DAILY    glyBURIDE (DIABETA) 2.5 mg tablet Take 1 Tablet by mouth Daily (before breakfast). glucose blood VI test strips (blood glucose test) strip E11.9 check blood sugar daily    Blood-Glucose Meter (Blood Glucose Monitoring) monitoring kit E11.9 check blood sugar daily    lancets misc E11.9  check blood sugar daily    aspirin delayed-release 81 mg tablet Take 1 Tablet by mouth daily. Eliquis 5 mg tablet TAKE ONE TABLET BY MOUTH TWICE A DAY.     amLODIPine (NORVASC) 5 mg tablet TAKE ONE (1) TABLET BY MOUTH TWICE DAILY    ferrous sulfate (IRON) 325 mg (65 mg iron) EC tablet TAKE 1 TABLET TWICE DAILY    sodium bicarbonate 325 mg tablet Take 325 mg by mouth two (2) times a day. cetirizine (ZYRTEC) 10 mg tablet TAKE (1) TABLET DAILY. losartan (COZAAR) 100 mg tablet Take 1 Tab by mouth daily. (Patient not taking: No sig reported)    carvedilol (COREG) 12.5 mg tablet Take 1.5 Tabs by mouth two (2) times a day. hydrALAZINE (APRESOLINE) 25 mg tablet Take 1 Tab by mouth two (2) times a day. amiodarone (CORDARONE) 200 mg tablet Take 200 mg by mouth daily. magnesium oxide (MAG-OX) 400 mg tablet Take 400 mg by mouth daily. Review of Systems:  A detailed 10 organ review of systems is obtained with pertinent positives as listed in the History of Present Illness and Past Medical History. All others are negative. Objective:     Physical Exam:  Visit Vitals  /76   Pulse 87   Temp 97.8 °F (36.6 °C)   Resp 17   Ht 4' 11\" (1.499 m)   Wt 61.7 kg (136 lb)   SpO2 99%   BMI 27.47 kg/m²        Skin:  Extremities and face reveal no rashes. No sosa erythema. No telangiectasias on the chest wall. HEENT: Sclerae anicteric. Extra-occular muscles are intact. No oral ulcers. No abnormal pigmentation of the lips. The neck is supple. Cardiovascular: Regular rate and rhythm. No murmurs, gallops, or rubs. PMI nondisplaced. Carotids without bruits. Respiratory:  Comfortable breathing with no accessory muscle use. Clear breath sounds with no wheezes, rales, or rhonchi. GI:  Abdomen nondistended, soft, and nontender. Normal active bowel sounds. No enlargement of the liver or spleen. No masses palpable. Rectal:  Deferred  Musculoskeletal:  No pitting edema of the lower legs. Extremities have good range of motion. No costovertebral tenderness. Neurological:  Gross memory appears intact. Patient is alert and oriented. Psychiatric:  Mood appears appropriate with judgement intact. Lymphatic:  No cervical or supraclavicular adenopathy.     Lab/Data Review:  Recent Results (from the past 24 hour(s))   URINALYSIS W/ RFLX MICROSCOPIC Collection Time: 01/03/23  2:52 PM   Result Value Ref Range    Color Yellow/Straw      Appearance Clear Clear      Specific gravity 1.018 1.003 - 1.030      pH (UA) 5.0 5.0 - 8.0      Protein Negative Negative mg/dL    Glucose Negative Negative mg/dL    Ketone Negative Negative mg/dL    Bilirubin Negative Negative      Blood Negative Negative      Urobilinogen 0.1 0.1 - 1.0 EU/dL    Nitrites Negative Negative      Leukocyte Esterase Small (A) Negative     URINE MICROSCOPIC    Collection Time: 01/03/23  2:52 PM   Result Value Ref Range    WBC 5-10 0 - 4 /hpf    RBC 0-5 0 - 5 /hpf    Epithelial cells Few Few /lpf    Bacteria Negative Negative /hpf    Hyaline cast 2-5 0 - 5 /lpf   PROTHROMBIN TIME + INR    Collection Time: 01/03/23  3:14 PM   Result Value Ref Range    Prothrombin time 14.8 (H) 11.9 - 14.6 sec    INR 1.1 0.9 - 1.1     GLUCOSE, POC    Collection Time: 01/03/23 10:48 PM   Result Value Ref Range    Glucose (POC) 146 (H) 65 - 100 mg/dL    Performed by [facility]    METABOLIC PANEL, COMPREHENSIVE    Collection Time: 01/04/23  3:04 AM   Result Value Ref Range    Sodium 145 136 - 145 mmol/L    Potassium 3.7 3.5 - 5.1 mmol/L    Chloride 111 (H) 97 - 108 mmol/L    CO2 27 21 - 32 mmol/L    Anion gap 7 5 - 15 mmol/L    Glucose 106 (H) 65 - 100 mg/dL    BUN 71 (H) 6 - 20 mg/dL    Creatinine 1.74 (H) 0.55 - 1.02 mg/dL    BUN/Creatinine ratio 41 (H) 12 - 20      eGFR 28 (L) >60 ml/min/1.73m2    Calcium 8.7 8.5 - 10.1 mg/dL    Bilirubin, total 0.6 0.2 - 1.0 mg/dL    AST (SGOT) 16 15 - 37 U/L    ALT (SGPT) 17 12 - 78 U/L    Alk.  phosphatase 73 45 - 117 U/L    Protein, total 6.2 (L) 6.4 - 8.2 g/dL    Albumin 3.0 (L) 3.5 - 5.0 g/dL    Globulin 3.2 2.0 - 4.0 g/dL    A-G Ratio 0.9 (L) 1.1 - 2.2     CBC WITH AUTOMATED DIFF    Collection Time: 01/04/23  3:04 AM   Result Value Ref Range    WBC 10.9 3.6 - 11.0 K/uL    RBC 3.15 (L) 3.80 - 5.20 M/uL    HGB 8.0 (L) 11.5 - 16.0 g/dL    HCT 25.4 (L) 35.0 - 47.0 %    MCV 80.6 80.0 - 99.0 FL    MCH 25.4 (L) 26.0 - 34.0 PG    MCHC 31.5 30.0 - 36.5 g/dL    RDW 14.9 (H) 11.5 - 14.5 %    PLATELET 837 185 - 401 K/uL    MPV 10.9 8.9 - 12.9 FL    NRBC 0.0 0.0  WBC    ABSOLUTE NRBC 0.00 0.00 - 0.01 K/uL    NEUTROPHILS 64 32 - 75 %    LYMPHOCYTES 25 12 - 49 %    MONOCYTES 9 5 - 13 %    EOSINOPHILS 1 0 - 7 %    BASOPHILS 1 0 - 1 %    IMMATURE GRANULOCYTES 0 0 - 0.5 %    ABS. NEUTROPHILS 6.9 1.8 - 8.0 K/UL    ABS. LYMPHOCYTES 2.8 0.8 - 3.5 K/UL    ABS. MONOCYTES 1.0 0.0 - 1.0 K/UL    ABS. EOSINOPHILS 0.1 0.0 - 0.4 K/UL    ABS. BASOPHILS 0.1 0.0 - 0.1 K/UL    ABS. IMM. GRANS. 0.0 0.00 - 0.04 K/UL    DF AUTOMATED     GLUCOSE, POC    Collection Time: 01/04/23  7:33 AM   Result Value Ref Range    Glucose (POC) 153 (H) 65 - 100 mg/dL    Performed by Aby Marroquin, POC    Collection Time: 01/04/23 11:13 AM   Result Value Ref Range    Glucose (POC) 101 (H) 65 - 100 mg/dL    Performed by Emelina Mosquera         CT ABD PELV WO CONT   Final Result   1. Several chronic findings including a nonobstructive left lower pole renal   stone, moderate hiatal hernia, bilateral renal cysts, sigmoid diverticulosis,   and lumbar spine degenerative changes. 2.  No acute pathology in the abdomen or pelvis. Assessment/Plan:     Active Problems:    GI bleed (1/3/2023)      Acute GI bleeding (1/3/2023)    Sigmoid diverticulosis    Atrial fibrillation on Eliquis    Hold Eliquis.  Consult cardiology regarding alternatives to VTE prophylaxis for atrial fibrillation  Continue protonix 40 mg IV Q12H  Clear liquid diet  Elective colonoscopy next week, or sooner if symptoms do not improve    IP CONSULT TO GASTROENTEROLOGY    Thank you for allowing me to participate in this patients care  [facility] Referring Physician   Shae Jenkins MD

## 2023-01-04 NOTE — PROGRESS NOTES
General Daily Progress Note          Patient Name:   Rosa Isela Gallo       YOB: 1935       Age:  80 y.o. Admit Date: 1/3/2023      Subjective:     Patient is a 80y.o. year old female with PMH of HTN, CHF, chronic A. fib CKI stage 3b, and type II DM presents to ED with complaint of bloody stool that began Sunday. He is on Eliquis for A. fib patient experiences bloody stool whenever she attempts to eat or drink water. She states she has not been able to eat since Sunday. The blood is described as a mix of dark and bright red at different times. Patient had a colonoscopy done >5 years ago which did not reveal any abnormalities. Per daughter, Rae Smith, last meal patient had was shrimp fried rice from unknown restaurant. Patient denies fever and abdominal pain. Only stated pain is in her right ankle, which she sprained. Hgb 5.7 in ED       1/4    After blood transfusion hemoglobin stable          Objective:     Visit Vitals  BP (!) 142/67   Pulse 99   Temp 97.8 °F (36.6 °C)   Resp 20   Ht 4' 11\" (1.499 m)   Wt 61.7 kg (136 lb)   SpO2 98%   BMI 27.47 kg/m²        Recent Results (from the past 24 hour(s))   CBC WITH AUTOMATED DIFF    Collection Time: 01/03/23  2:18 PM   Result Value Ref Range    WBC 8.5 3.6 - 11.0 K/uL    RBC 2.33 (L) 3.80 - 5.20 M/uL    HGB 5.7 (LL) 11.5 - 16.0 g/dL    HCT 18.3 (L) 35.0 - 47.0 %    MCV 78.5 (L) 80.0 - 99.0 FL    MCH 24.5 (L) 26.0 - 34.0 PG    MCHC 31.1 30.0 - 36.5 g/dL    RDW 13.4 11.5 - 14.5 %    PLATELET 028 324 - 372 K/uL    MPV 10.6 8.9 - 12.9 FL    NRBC 0.0 0.0  WBC    ABSOLUTE NRBC 0.00 0.00 - 0.01 K/uL    NEUTROPHILS 69 32 - 75 %    LYMPHOCYTES 23 12 - 49 %    MONOCYTES 7 5 - 13 %    EOSINOPHILS 0 0 - 7 %    BASOPHILS 0 0 - 1 %    IMMATURE GRANULOCYTES 1 (H) 0 - 0.5 %    ABS. NEUTROPHILS 5.9 1.8 - 8.0 K/UL    ABS. LYMPHOCYTES 1.9 0.8 - 3.5 K/UL    ABS. MONOCYTES 0.6 0.0 - 1.0 K/UL    ABS. EOSINOPHILS 0.0 0.0 - 0.4 K/UL    ABS.  BASOPHILS 0.0 0.0 - 0.1 K/UL    ABS. IMM. GRANS. 0.0 0.00 - 0.04 K/UL    DF AUTOMATED     TYPE & SCREEN    Collection Time: 01/03/23  2:18 PM   Result Value Ref Range    Crossmatch Expiration 01/06/2023,2359     ABO/Rh(D) Romano Thomas Positive     Antibody screen Negative     Unit number [id number]     Blood component type  LR     Unit division 00     Status of unit Issued,final     TRANSFUSION STATUS Ok to transfuse     Crossmatch result Compatible     Unit number [id number]     Blood component type  LR     Unit division 00     Status of unit [address] to transfuse     Crossmatch result Compatible     Unit number [id number]     Blood component type  LR     Unit division 00     Status of unit Issued,final     TRANSFUSION STATUS Ok to transfuse     Crossmatch result Compatible    METABOLIC PANEL, COMPREHENSIVE    Collection Time: 01/03/23  2:18 PM   Result Value Ref Range    Sodium 144 136 - 145 mmol/L    Potassium 4.3 3.5 - 5.1 mmol/L    Chloride 108 97 - 108 mmol/L    CO2 27 21 - 32 mmol/L    Anion gap 9 5 - 15 mmol/L    Glucose 185 (H) 65 - 100 mg/dL    BUN 72 (H) 6 - 20 mg/dL    Creatinine 1.86 (H) 0.55 - 1.02 mg/dL    BUN/Creatinine ratio 39 (H) 12 - 20      eGFR 26 (L) >60 ml/min/1.73m2    Calcium 9.2 8.5 - 10.1 mg/dL    Bilirubin, total 0.4 0.2 - 1.0 mg/dL    AST (SGOT) 21 15 - 37 U/L    ALT (SGPT) 18 12 - 78 U/L    Alk.  phosphatase 82 45 - 117 U/L    Protein, total 6.0 (L) 6.4 - 8.2 g/dL    Albumin 3.2 (L) 3.5 - 5.0 g/dL    Globulin 2.8 2.0 - 4.0 g/dL    A-G Ratio 1.1 1.1 - 2.2     NT-PRO BNP    Collection Time: 01/03/23  2:18 PM   Result Value Ref Range    NT pro- (H) <450 pg/mL   TROPONIN-HIGH SENSITIVITY    Collection Time: 01/03/23  2:18 PM   Result Value Ref Range    Troponin-High Sensitivity 36 0 - 51 ng/L   EKG, 12 LEAD, INITIAL    Collection Time: 01/03/23  2:27 PM   Result Value Ref Range    Ventricular Rate 85 BPM    Atrial Rate 110 BPM    QRS Duration 134 ms    Q-T Interval 420 ms    QTC Calculation (Bezet) 499 ms    Calculated R Axis -19 degrees    Calculated T Axis 25 degrees    Diagnosis       Atrial fibrillation with premature ventricular or aberrantly conducted   complexes  Right bundle branch block  Abnormal ECG  No previous ECGs available  Confirmed by Katelyn Herr MD, Pretty Porras ([id number]) on 1/3/2023 4:24:42 PM     URINALYSIS W/ RFLX MICROSCOPIC    Collection Time: 01/03/23  2:52 PM   Result Value Ref Range    Color Yellow/Straw      Appearance Clear Clear      Specific gravity 1.018 1.003 - 1.030      pH (UA) 5.0 5.0 - 8.0      Protein Negative Negative mg/dL    Glucose Negative Negative mg/dL    Ketone Negative Negative mg/dL    Bilirubin Negative Negative      Blood Negative Negative      Urobilinogen 0.1 0.1 - 1.0 EU/dL    Nitrites Negative Negative      Leukocyte Esterase Small (A) Negative     URINE MICROSCOPIC    Collection Time: 01/03/23  2:52 PM   Result Value Ref Range    WBC 5-10 0 - 4 /hpf    RBC 0-5 0 - 5 /hpf    Epithelial cells Few Few /lpf    Bacteria Negative Negative /hpf    Hyaline cast 2-5 0 - 5 /lpf   PROTHROMBIN TIME + INR    Collection Time: 01/03/23  3:14 PM   Result Value Ref Range    Prothrombin time 14.8 (H) 11.9 - 14.6 sec    INR 1.1 0.9 - 1.1     GLUCOSE, POC    Collection Time: 01/03/23 10:48 PM   Result Value Ref Range    Glucose (POC) 146 (H) 65 - 100 mg/dL    Performed by [facility]    METABOLIC PANEL, COMPREHENSIVE    Collection Time: 01/04/23  3:04 AM   Result Value Ref Range    Sodium 145 136 - 145 mmol/L    Potassium 3.7 3.5 - 5.1 mmol/L    Chloride 111 (H) 97 - 108 mmol/L    CO2 27 21 - 32 mmol/L    Anion gap 7 5 - 15 mmol/L    Glucose 106 (H) 65 - 100 mg/dL    BUN 71 (H) 6 - 20 mg/dL    Creatinine 1.74 (H) 0.55 - 1.02 mg/dL    BUN/Creatinine ratio 41 (H) 12 - 20      eGFR 28 (L) >60 ml/min/1.73m2    Calcium 8.7 8.5 - 10.1 mg/dL    Bilirubin, total 0.6 0.2 - 1.0 mg/dL    AST (SGOT) 16 15 - 37 U/L    ALT (SGPT) 17 12 - 78 U/L    Alk.  phosphatase 73 45 - 117 U/L Protein, total 6.2 (L) 6.4 - 8.2 g/dL    Albumin 3.0 (L) 3.5 - 5.0 g/dL    Globulin 3.2 2.0 - 4.0 g/dL    A-G Ratio 0.9 (L) 1.1 - 2.2     CBC WITH AUTOMATED DIFF    Collection Time: 01/04/23  3:04 AM   Result Value Ref Range    WBC 10.9 3.6 - 11.0 K/uL    RBC 3.15 (L) 3.80 - 5.20 M/uL    HGB 8.0 (L) 11.5 - 16.0 g/dL    HCT 25.4 (L) 35.0 - 47.0 %    MCV 80.6 80.0 - 99.0 FL    MCH 25.4 (L) 26.0 - 34.0 PG    MCHC 31.5 30.0 - 36.5 g/dL    RDW 14.9 (H) 11.5 - 14.5 %    PLATELET 269 369 - 193 K/uL    MPV 10.9 8.9 - 12.9 FL    NRBC 0.0 0.0  WBC    ABSOLUTE NRBC 0.00 0.00 - 0.01 K/uL    NEUTROPHILS 64 32 - 75 %    LYMPHOCYTES 25 12 - 49 %    MONOCYTES 9 5 - 13 %    EOSINOPHILS 1 0 - 7 %    BASOPHILS 1 0 - 1 %    IMMATURE GRANULOCYTES 0 0 - 0.5 %    ABS. NEUTROPHILS 6.9 1.8 - 8.0 K/UL    ABS. LYMPHOCYTES 2.8 0.8 - 3.5 K/UL    ABS. MONOCYTES 1.0 0.0 - 1.0 K/UL    ABS. EOSINOPHILS 0.1 0.0 - 0.4 K/UL    ABS. BASOPHILS 0.1 0.0 - 0.1 K/UL    ABS. IMM. GRANS. 0.0 0.00 - 0.04 K/UL    DF AUTOMATED     GLUCOSE, POC    Collection Time: 01/04/23  7:33 AM   Result Value Ref Range    Glucose (POC) 153 (H) 65 - 100 mg/dL    Performed by Ishan Ordonez      [unfilled]      Review of Systems    Constitutional: Negative for chills and fever. HENT: Negative. Eyes: Negative. Respiratory: Negative. Cardiovascular: Negative. Gastrointestinal: Negative for abdominal pain and nausea. Skin: Negative. Neurological: Negative. Physical Exam:      Constitutional: pt is oriented to person, place, and time. HENT:   Head: Normocephalic and atraumatic. Eyes: Pupils are equal, round, and reactive to light. EOM are normal.   Cardiovascular: Normal rate, regular rhythm and normal heart sounds. Pulmonary/Chest: Breath sounds normal. No wheezes. No rales. Exhibits no tenderness. Abdominal: Soft. Bowel sounds are normal. There is no abdominal tenderness. There is no rebound and no guarding.    Musculoskeletal: Normal range of motion. Neurological: pt is alert and oriented to person, place, and time. CT ABD PELV WO CONT   Final Result   1. Several chronic findings including a nonobstructive left lower pole renal   stone, moderate hiatal hernia, bilateral renal cysts, sigmoid diverticulosis,   and lumbar spine degenerative changes. 2.  No acute pathology in the abdomen or pelvis. Recent Results (from the past 24 hour(s))   CBC WITH AUTOMATED DIFF    Collection Time: 01/03/23  2:18 PM   Result Value Ref Range    WBC 8.5 3.6 - 11.0 K/uL    RBC 2.33 (L) 3.80 - 5.20 M/uL    HGB 5.7 (LL) 11.5 - 16.0 g/dL    HCT 18.3 (L) 35.0 - 47.0 %    MCV 78.5 (L) 80.0 - 99.0 FL    MCH 24.5 (L) 26.0 - 34.0 PG    MCHC 31.1 30.0 - 36.5 g/dL    RDW 13.4 11.5 - 14.5 %    PLATELET 912 686 - 655 K/uL    MPV 10.6 8.9 - 12.9 FL    NRBC 0.0 0.0  WBC    ABSOLUTE NRBC 0.00 0.00 - 0.01 K/uL    NEUTROPHILS 69 32 - 75 %    LYMPHOCYTES 23 12 - 49 %    MONOCYTES 7 5 - 13 %    EOSINOPHILS 0 0 - 7 %    BASOPHILS 0 0 - 1 %    IMMATURE GRANULOCYTES 1 (H) 0 - 0.5 %    ABS. NEUTROPHILS 5.9 1.8 - 8.0 K/UL    ABS. LYMPHOCYTES 1.9 0.8 - 3.5 K/UL    ABS. MONOCYTES 0.6 0.0 - 1.0 K/UL    ABS. EOSINOPHILS 0.0 0.0 - 0.4 K/UL    ABS. BASOPHILS 0.0 0.0 - 0.1 K/UL    ABS. IMM.  GRANS. 0.0 0.00 - 0.04 K/UL    DF AUTOMATED     TYPE & SCREEN    Collection Time: 01/03/23  2:18 PM   Result Value Ref Range    Crossmatch Expiration 01/06/2023,2359     ABO/Rh(D) O Positive     Antibody screen Negative     Unit number [id number]     Blood component type  LR     Unit division 00     Status of unit Issued,final     TRANSFUSION STATUS Ok to transfuse     Crossmatch result Compatible     Unit number [id number]     Blood component type [facility]     Unit division 00     Status of unit [address] to transfuse     Crossmatch result Compatible     Unit number [id number]     Blood component type [facility]     Unit division 00     Status of unit Issued,final     TRANSFUSION STATUS Ok to transfuse     Crossmatch result Compatible    METABOLIC PANEL, COMPREHENSIVE    Collection Time: 01/03/23  2:18 PM   Result Value Ref Range    Sodium 144 136 - 145 mmol/L    Potassium 4.3 3.5 - 5.1 mmol/L    Chloride 108 97 - 108 mmol/L    CO2 27 21 - 32 mmol/L    Anion gap 9 5 - 15 mmol/L    Glucose 185 (H) 65 - 100 mg/dL    BUN 72 (H) 6 - 20 mg/dL    Creatinine 1.86 (H) 0.55 - 1.02 mg/dL    BUN/Creatinine ratio 39 (H) 12 - 20      eGFR 26 (L) >60 ml/min/1.73m2    Calcium 9.2 8.5 - 10.1 mg/dL    Bilirubin, total 0.4 0.2 - 1.0 mg/dL    AST (SGOT) 21 15 - 37 U/L    ALT (SGPT) 18 12 - 78 U/L    Alk.  phosphatase 82 45 - 117 U/L    Protein, total 6.0 (L) 6.4 - 8.2 g/dL    Albumin 3.2 (L) 3.5 - 5.0 g/dL    Globulin 2.8 2.0 - 4.0 g/dL    A-G Ratio 1.1 1.1 - 2.2     NT-PRO BNP    Collection Time: 01/03/23  2:18 PM   Result Value Ref Range    NT pro- (H) <450 pg/mL   TROPONIN-HIGH SENSITIVITY    Collection Time: 01/03/23  2:18 PM   Result Value Ref Range    Troponin-High Sensitivity 36 0 - 51 ng/L   EKG, 12 LEAD, INITIAL    Collection Time: 01/03/23  2:27 PM   Result Value Ref Range    Ventricular Rate 85 BPM    Atrial Rate 110 BPM    QRS Duration 134 ms    Q-T Interval 420 ms    QTC Calculation (Bezet) 499 ms    Calculated R Axis -19 degrees    Calculated T Axis 25 degrees    Diagnosis       Atrial fibrillation with premature ventricular or aberrantly conducted   complexes  Right bundle branch block  Abnormal ECG  No previous ECGs available  Confirmed by Tori Harris MD, Christian Howell ([id number]) on 1/3/2023 4:24:42 PM     URINALYSIS W/ RFLX MICROSCOPIC    Collection Time: 01/03/23  2:52 PM   Result Value Ref Range    Color Yellow/Straw      Appearance Clear Clear      Specific gravity 1.018 1.003 - 1.030      pH (UA) 5.0 5.0 - 8.0      Protein Negative Negative mg/dL    Glucose Negative Negative mg/dL    Ketone Negative Negative mg/dL    Bilirubin Negative Negative      Blood Negative Negative      Urobilinogen 0.1 0.1 - 1.0 EU/dL    Nitrites Negative Negative      Leukocyte Esterase Small (A) Negative     URINE MICROSCOPIC    Collection Time: 01/03/23  2:52 PM   Result Value Ref Range    WBC 5-10 0 - 4 /hpf    RBC 0-5 0 - 5 /hpf    Epithelial cells Few Few /lpf    Bacteria Negative Negative /hpf    Hyaline cast 2-5 0 - 5 /lpf   PROTHROMBIN TIME + INR    Collection Time: 01/03/23  3:14 PM   Result Value Ref Range    Prothrombin time 14.8 (H) 11.9 - 14.6 sec    INR 1.1 0.9 - 1.1     GLUCOSE, POC    Collection Time: 01/03/23 10:48 PM   Result Value Ref Range    Glucose (POC) 146 (H) 65 - 100 mg/dL    Performed by [facility]    METABOLIC PANEL, COMPREHENSIVE    Collection Time: 01/04/23  3:04 AM   Result Value Ref Range    Sodium 145 136 - 145 mmol/L    Potassium 3.7 3.5 - 5.1 mmol/L    Chloride 111 (H) 97 - 108 mmol/L    CO2 27 21 - 32 mmol/L    Anion gap 7 5 - 15 mmol/L    Glucose 106 (H) 65 - 100 mg/dL    BUN 71 (H) 6 - 20 mg/dL    Creatinine 1.74 (H) 0.55 - 1.02 mg/dL    BUN/Creatinine ratio 41 (H) 12 - 20      eGFR 28 (L) >60 ml/min/1.73m2    Calcium 8.7 8.5 - 10.1 mg/dL    Bilirubin, total 0.6 0.2 - 1.0 mg/dL    AST (SGOT) 16 15 - 37 U/L    ALT (SGPT) 17 12 - 78 U/L    Alk.  phosphatase 73 45 - 117 U/L    Protein, total 6.2 (L) 6.4 - 8.2 g/dL    Albumin 3.0 (L) 3.5 - 5.0 g/dL    Globulin 3.2 2.0 - 4.0 g/dL    A-G Ratio 0.9 (L) 1.1 - 2.2     CBC WITH AUTOMATED DIFF    Collection Time: 01/04/23  3:04 AM   Result Value Ref Range    WBC 10.9 3.6 - 11.0 K/uL    RBC 3.15 (L) 3.80 - 5.20 M/uL    HGB 8.0 (L) 11.5 - 16.0 g/dL    HCT 25.4 (L) 35.0 - 47.0 %    MCV 80.6 80.0 - 99.0 FL    MCH 25.4 (L) 26.0 - 34.0 PG    MCHC 31.5 30.0 - 36.5 g/dL    RDW 14.9 (H) 11.5 - 14.5 %    PLATELET 741 180 - 119 K/uL    MPV 10.9 8.9 - 12.9 FL    NRBC 0.0 0.0  WBC    ABSOLUTE NRBC 0.00 0.00 - 0.01 K/uL    NEUTROPHILS 64 32 - 75 %    LYMPHOCYTES 25 12 - 49 %    MONOCYTES 9 5 - 13 %    EOSINOPHILS 1 0 - 7 % BASOPHILS 1 0 - 1 %    IMMATURE GRANULOCYTES 0 0 - 0.5 %    ABS. NEUTROPHILS 6.9 1.8 - 8.0 K/UL    ABS. LYMPHOCYTES 2.8 0.8 - 3.5 K/UL    ABS. MONOCYTES 1.0 0.0 - 1.0 K/UL    ABS. EOSINOPHILS 0.1 0.0 - 0.4 K/UL    ABS. BASOPHILS 0.1 0.0 - 0.1 K/UL    ABS. IMM. GRANS. 0.0 0.00 - 0.04 K/UL    DF AUTOMATED     GLUCOSE, POC    Collection Time: 01/04/23  7:33 AM   Result Value Ref Range    Glucose (POC) 153 (H) 65 - 100 mg/dL    Performed by Hailee Garnett        Results       ** No results found for the last 336 hours. **             Labs:     Recent Labs     01/04/23  0304 01/03/23  1418   WBC 10.9 8.5   HGB 8.0* 5.7*   HCT 25.4* 18.3*    170     Recent Labs     01/04/23  0304 01/03/23  1418    144   K 3.7 4.3   * 108   CO2 27 27   BUN 71* 72*   CREA 1.74* 1.86*   * 185*   CA 8.7 9.2     Recent Labs     01/04/23  0304 01/03/23  1418   ALT 17 18   AP 73 82   TBILI 0.6 0.4   TP 6.2* 6.0*   ALB 3.0* 3.2*   GLOB 3.2 2.8     Recent Labs     01/03/23  1514   INR 1.1   PTP 14.8*      No results for input(s): FE, TIBC, PSAT, FERR in the last 72 hours. No results found for: FOL, RBCF   No results for input(s): PH, PCO2, PO2 in the last 72 hours. No results for input(s): CPK, CKNDX, TROIQ in the last 72 hours.     No lab exists for component: CPKMB  Lab Results   Component Value Date/Time    Cholesterol, total 181 11/05/2020 12:48 PM    HDL Cholesterol 66 11/05/2020 12:48 PM    LDL, calculated 96 11/05/2020 12:48 PM    LDL, calculated 91 03/11/2019 03:16 PM    Triglyceride 105 11/05/2020 12:48 PM     Lab Results   Component Value Date/Time    Glucose (POC) 153 (H) 01/04/2023 07:33 AM    Glucose (POC) 146 (H) 01/03/2023 10:48 PM     Lab Results   Component Value Date/Time    Color Yellow/Straw 01/03/2023 02:52 PM    Appearance Clear 01/03/2023 02:52 PM    Specific gravity 1.018 01/03/2023 02:52 PM    Specific gravity 1.019 06/11/2021 02:45 PM    pH (UA) 5.0 01/03/2023 02:52 PM    Protein Negative 01/03/2023 02:52 PM    Glucose Negative 01/03/2023 02:52 PM    Ketone Negative 01/03/2023 02:52 PM    Bilirubin Negative 01/03/2023 02:52 PM    Urobilinogen 0.1 01/03/2023 02:52 PM    Nitrites Negative 01/03/2023 02:52 PM    Leukocyte Esterase Small (A) 01/03/2023 02:52 PM    Epithelial cells Few 01/03/2023 02:52 PM    Bacteria Negative 01/03/2023 02:52 PM    WBC 5-10 01/03/2023 02:52 PM    RBC 0-5 01/03/2023 02:52 PM         Assessment:     Acute GI bleed of unspecified location    2    Acute blood  loss anemia    3    CKD stage 3    4    Diabetes    5    CHF    6  HTN  Continue home medications      Plan:        To monitor H&H and renal function  Repeat the labs in the morning    GI consult pending regarding GI bleed      Current Facility-Administered Medications:     0.9% sodium chloride infusion 250 mL, 250 mL, IntraVENous, PRN, Shantanu Reynoso MD    amiodarone (CORDARONE) tablet 200 mg, 200 mg, Oral, DAILY, Maddie Reynoso MD    amLODIPine (NORVASC) tablet 5 mg, 5 mg, Oral, DAILY, Maddie Reynoso MD, 5 mg at 01/04/23 0856    carvediloL (COREG) tablet 18.75 mg, 18.75 mg, Oral, BID, Maddie Reynoso MD    .PHARMACY TO SUBSTITUTE PER PROTOCOL (Reordered from: ferrous sulfate (IRON) 325 mg (65 mg iron) EC tablet), , , Per Protocol, Rebeka Wilkerson MD    hydrALAZINE (APRESOLINE) tablet 25 mg, 25 mg, Oral, BID, Maddie Reynoso MD, 25 mg at 01/04/23 0856    sodium bicarbonate tablet 325 mg, 325 mg, Oral, BID, Maddie Reynoso MD, 325 mg at 01/04/23 0856    insulin lispro (HUMALOG) injection, , SubCUTAneous, AC&HS, Maddie Reynoso MD, 3 Units at 01/04/23 0800    glucose chewable tablet 16 g, 4 Tablet, Oral, PRN, Maddie Reynoso MD    glucagon (GLUCAGEN) injection 1 mg, 1 mg, IntraMUSCular, PRN, Rebeka Wilkerson MD    0.9% sodium chloride infusion, 75 mL/hr, IntraVENous, CONTINUOUS, Maddie Reynoso MD, Last Rate: 75 mL/hr at 01/03/23 2306, 75 mL/hr at 01/03/23 2306    acetaminophen (TYLENOL) tablet 650 mg, 650 mg, Oral, Q6H PRN **OR** acetaminophen (TYLENOL) suppository 650 mg, 650 mg, Rectal, Q6H PRN, Maddie Reynoso MD    polyethylene glycol (MIRALAX) packet 17 g, 17 g, Oral, DAILY PRN, Maddie Reynoso MD    ondansetron (ZOFRAN ODT) tablet 4 mg, 4 mg, Oral, Q8H PRN **OR** ondansetron (ZOFRAN) injection 4 mg, 4 mg, IntraVENous, Q6H PRN, Maddie Reynoso MD    pantoprazole (PROTONIX) 40 mg in 0.9% sodium chloride 10 mL injection, 40 mg, IntraVENous, Q12H, Maddie Reynoso MD, 40 mg at 01/04/23 3407    Current Outpatient Medications:     chlorthalidone (HYGROTON) 25 mg tablet, TAKE ONE TABLET BY MOUTH ONCE DAILY, Disp: 90 Tablet, Rfl: 1    glyBURIDE (DIABETA) 2.5 mg tablet, Take 1 Tablet by mouth Daily (before breakfast). , Disp: 90 Tablet, Rfl: 1    glucose blood VI test strips (blood glucose test) strip, E11.9 check blood sugar daily, Disp: 100 Strip, Rfl: 3    Blood-Glucose Meter (Blood Glucose Monitoring) monitoring kit, E11.9 check blood sugar daily, Disp: 1 Kit, Rfl: 0    lancets misc, E11.9  check blood sugar daily, Disp: 100 Each, Rfl: 3    aspirin delayed-release 81 mg tablet, Take 1 Tablet by mouth daily. , Disp: 90 Tablet, Rfl: 2    Eliquis 5 mg tablet, TAKE ONE TABLET BY MOUTH TWICE A DAY., Disp: 180 Tablet, Rfl: 0    amLODIPine (NORVASC) 5 mg tablet, TAKE ONE (1) TABLET BY MOUTH TWICE DAILY, Disp: 180 Tablet, Rfl: 1    ferrous sulfate (IRON) 325 mg (65 mg iron) EC tablet, TAKE 1 TABLET TWICE DAILY, Disp: 180 Tab, Rfl: 0    sodium bicarbonate 325 mg tablet, Take 325 mg by mouth two (2) times a day., Disp: , Rfl:     cetirizine (ZYRTEC) 10 mg tablet, TAKE (1) TABLET DAILY. , Disp: 30 Tab, Rfl: 0    losartan (COZAAR) 100 mg tablet, Take 1 Tab by mouth daily.  (Patient not taking: No sig reported), Disp: 90 Tab, Rfl: 3    carvedilol (COREG) 12.5 mg tablet, Take 1.5 Tabs by mouth two (2) times a day., Disp: , Rfl:     hydrALAZINE (APRESOLINE) 25 mg tablet, Take 1 Tab by mouth two (2) times a day., Disp: , Rfl:     amiodarone (CORDARONE) 200 mg tablet, Take 200 mg by mouth daily. , Disp: , Rfl:     magnesium oxide (MAG-OX) 400 mg tablet, Take 400 mg by mouth daily. , Disp: , Rfl:

## 2023-01-04 NOTE — CONSULTS
Consult Date: 1/4/2023     Subjective:     Chief Complaint: rectal bleeding    History of Present Illness: Gwyndolyn Mortimer is a 80 y.o. female with history of hypertension, CHF, atrial fibrillation on Eliquis, CKD stage 3b, and type 2 diabetes mellitus. On 1/3/2023, patient presented to ED for 2 days of rectal bleeding, sudden onset, described as a mix of bright and dark red blood. No abdominal pain, nausea, or vomiting. No history of similar. Last colonoscopy ~5 years ago, cannot recall anything abnormal. ED workup significant for anemia (Hgb 5.7) and elevated BUN. CT abdomen without contrast showed sigmoid diverticulosis. Admitted for further evaluation and management of rectal bleeding    She is seen in consultation for rectal bleeding.    ----------    Patient is seen and examined in ED. She is sitting up in bed on room air. Daughter at the bedside. Patient had just had a bowel movement with darker red blood. No abdominal pain, nausea, or vomiting. No history of similar. Denies fever, chills, cough, chest pain, shortness of breath, or changes in bladder habits.        Past Medical History:   Diagnosis Date    CHF (congestive heart failure) (HCC)     Diabetes ([address]     Hypertension     Kidney disease      Past Surgical History:   Procedure Laterality Date    HX BREAST BIOPSY        Family History   Problem Relation Age of Onset    Cancer Mother     Heart Disease Mother      Social History     Tobacco Use    Smoking status: Never    Smokeless tobacco: Never   Substance Use Topics    Alcohol use: No      No Known Allergies  Current Facility-Administered Medications   Medication Dose Route Frequency    0.9% sodium chloride infusion 250 mL  250 mL IntraVENous PRN    amiodarone (CORDARONE) tablet 200 mg  200 mg Oral DAILY    amLODIPine (NORVASC) tablet 5 mg  5 mg Oral DAILY    carvediloL (COREG) tablet 18.75 mg  18.75 mg Oral BID    ferrous sulfate tablet 325 mg  325 mg Oral BID    hydrALAZINE (APRESOLINE) tablet 25 mg  25 mg Oral BID    sodium bicarbonate tablet 325 mg  325 mg Oral BID    insulin lispro (HUMALOG) injection   SubCUTAneous AC&HS    glucose chewable tablet 16 g  4 Tablet Oral PRN    glucagon (GLUCAGEN) injection 1 mg  1 mg IntraMUSCular PRN    0.9% sodium chloride infusion  75 mL/hr IntraVENous CONTINUOUS    acetaminophen (TYLENOL) tablet 650 mg  650 mg Oral Q6H PRN    Or    acetaminophen (TYLENOL) suppository 650 mg  650 mg Rectal Q6H PRN    polyethylene glycol (MIRALAX) packet 17 g  17 g Oral DAILY PRN    ondansetron (ZOFRAN ODT) tablet 4 mg  4 mg Oral Q8H PRN    Or    ondansetron (ZOFRAN) injection 4 mg  4 mg IntraVENous Q6H PRN    pantoprazole (PROTONIX) 40 mg in 0.9% sodium chloride 10 mL injection  40 mg IntraVENous Q12H     Current Outpatient Medications   Medication Sig    chlorthalidone (HYGROTON) 25 mg tablet TAKE ONE TABLET BY MOUTH ONCE DAILY    glyBURIDE (DIABETA) 2.5 mg tablet Take 1 Tablet by mouth Daily (before breakfast). glucose blood VI test strips (blood glucose test) strip E11.9 check blood sugar daily    Blood-Glucose Meter (Blood Glucose Monitoring) monitoring kit E11.9 check blood sugar daily    lancets misc E11.9  check blood sugar daily    aspirin delayed-release 81 mg tablet Take 1 Tablet by mouth daily. Eliquis 5 mg tablet TAKE ONE TABLET BY MOUTH TWICE A DAY. amLODIPine (NORVASC) 5 mg tablet TAKE ONE (1) TABLET BY MOUTH TWICE DAILY    ferrous sulfate (IRON) 325 mg (65 mg iron) EC tablet TAKE 1 TABLET TWICE DAILY    sodium bicarbonate 325 mg tablet Take 325 mg by mouth two (2) times a day. cetirizine (ZYRTEC) 10 mg tablet TAKE (1) TABLET DAILY. losartan (COZAAR) 100 mg tablet Take 1 Tab by mouth daily. (Patient not taking: No sig reported)    carvedilol (COREG) 12.5 mg tablet Take 1.5 Tabs by mouth two (2) times a day. hydrALAZINE (APRESOLINE) 25 mg tablet Take 1 Tab by mouth two (2) times a day. amiodarone (CORDARONE) 200 mg tablet Take 200 mg by mouth daily. magnesium oxide (MAG-OX) 400 mg tablet Take 400 mg by mouth daily. Review of Systems:  A detailed 10 organ review of systems is obtained with pertinent positives as listed in the History of Present Illness and Past Medical History. All others are negative. Objective:     Physical Exam:  Visit Vitals  BP (!) 148/73   Pulse 82   Temp 97.8 °F (36.6 °C)   Resp 18   Ht 4' 11\" (1.499 m)   Wt 61.7 kg (136 lb)   SpO2 98%   BMI 27.47 kg/m²        Skin:  Extremities and face reveal no rashes. No sosa erythema. No telangiectasias on the chest wall. HEENT: Sclerae anicteric. Extra-occular muscles are intact. No oral ulcers. No abnormal pigmentation of the lips. The neck is supple. Cardiovascular: Regular rate and rhythm. No murmurs, gallops, or rubs. PMI nondisplaced. Carotids without bruits. Respiratory:  Comfortable breathing with no accessory muscle use. Clear breath sounds with no wheezes, rales, or rhonchi. GI:  Abdomen nondistended, soft, and nontender. Normal active bowel sounds. No enlargement of the liver or spleen. No masses palpable. Rectal:  Deferred  Musculoskeletal:  No pitting edema of the lower legs. Extremities have good range of motion. No costovertebral tenderness. Neurological:  Gross memory appears intact. Patient is alert and oriented. Psychiatric:  Mood appears appropriate with judgement intact. Lymphatic:  No cervical or supraclavicular adenopathy.     Lab/Data Review:  Recent Results (from the past 24 hour(s))   GLUCOSE, POC    Collection Time: 01/03/23 10:48 PM   Result Value Ref Range    Glucose (POC) 146 (H) 65 - 100 mg/dL    Performed by Ortiz Andino    METABOLIC PANEL, COMPREHENSIVE    Collection Time: 01/04/23  3:04 AM   Result Value Ref Range    Sodium 145 136 - 145 mmol/L    Potassium 3.7 3.5 - 5.1 mmol/L    Chloride 111 (H) 97 - 108 mmol/L    CO2 27 21 - 32 mmol/L    Anion gap 7 5 - 15 mmol/L    Glucose 106 (H) 65 - 100 mg/dL    BUN 71 (H) 6 - 20 mg/dL    Creatinine 1.74 (H) 0.55 - 1.02 mg/dL    BUN/Creatinine ratio 41 (H) 12 - 20      eGFR 28 (L) >60 ml/min/1.73m2    Calcium 8.7 8.5 - 10.1 mg/dL    Bilirubin, total 0.6 0.2 - 1.0 mg/dL    AST (SGOT) 16 15 - 37 U/L    ALT (SGPT) 17 12 - 78 U/L    Alk. phosphatase 73 45 - 117 U/L    Protein, total 6.2 (L) 6.4 - 8.2 g/dL    Albumin 3.0 (L) 3.5 - 5.0 g/dL    Globulin 3.2 2.0 - 4.0 g/dL    A-G Ratio 0.9 (L) 1.1 - 2.2     CBC WITH AUTOMATED DIFF    Collection Time: 01/04/23  3:04 AM   Result Value Ref Range    WBC 10.9 3.6 - 11.0 K/uL    RBC 3.15 (L) 3.80 - 5.20 M/uL    HGB 8.0 (L) 11.5 - 16.0 g/dL    HCT 25.4 (L) 35.0 - 47.0 %    MCV 80.6 80.0 - 99.0 FL    MCH 25.4 (L) 26.0 - 34.0 PG    MCHC 31.5 30.0 - 36.5 g/dL    RDW 14.9 (H) 11.5 - 14.5 %    PLATELET 126 602 - 625 K/uL    MPV 10.9 8.9 - 12.9 FL    NRBC 0.0 0.0  WBC    ABSOLUTE NRBC 0.00 0.00 - 0.01 K/uL    NEUTROPHILS 64 32 - 75 %    LYMPHOCYTES 25 12 - 49 %    MONOCYTES 9 5 - 13 %    EOSINOPHILS 1 0 - 7 %    BASOPHILS 1 0 - 1 %    IMMATURE GRANULOCYTES 0 0 - 0.5 %    ABS. NEUTROPHILS 6.9 1.8 - 8.0 K/UL    ABS. LYMPHOCYTES 2.8 0.8 - 3.5 K/UL    ABS. MONOCYTES 1.0 0.0 - 1.0 K/UL    ABS. EOSINOPHILS 0.1 0.0 - 0.4 K/UL    ABS. BASOPHILS 0.1 0.0 - 0.1 K/UL    ABS. IMM. GRANS. 0.0 0.00 - 0.04 K/UL    DF AUTOMATED     GLUCOSE, POC    Collection Time: 01/04/23  7:33 AM   Result Value Ref Range    Glucose (POC) 153 (H) 65 - 100 mg/dL    Performed by Ronald Amaro, POC    Collection Time: 01/04/23 11:13 AM   Result Value Ref Range    Glucose (POC) 101 (H) 65 - 100 mg/dL    Performed by Deacon Harvey, [address], POC    Collection Time: 01/04/23  4:06 PM   Result Value Ref Range    Glucose (POC) 131 (H) 65 - 100 mg/dL    Performed by Kim Turner         CT ABD PELV WO CONT   Final Result   1.   Several chronic findings including a nonobstructive left lower pole renal   stone, moderate hiatal hernia, bilateral renal cysts, sigmoid diverticulosis,   and lumbar spine degenerative changes. 2.  No acute pathology in the abdomen or pelvis. Assessment/Plan:     Active Problems:    GI bleed (1/3/2023)      Acute GI bleeding (1/3/2023)  Sigmoid diverticulosis    Atrial fibrillation on Eliquis    Hold Eliquis.  Consult cardiology regarding alternatives to thromboembolic prophylaxis for atrial fibrillation  Continue protonix 40 mg IV Q12H  Clear liquid diet  Elective colonoscopy next week, or sooner if symptoms do not improve    IP CONSULT TO GASTROENTEROLOGY  IP CONSULT TO CARDIOLOGY    Thank you for allowing me to participate in this patients care  Cc Referring Physician   Martin Ashraf MD

## 2023-01-05 ENCOUNTER — APPOINTMENT (OUTPATIENT)
Dept: NUCLEAR MEDICINE | Age: 88
DRG: 813 | End: 2023-01-05
Attending: INTERNAL MEDICINE
Payer: MEDICARE

## 2023-01-05 LAB
ABO + RH BLD: NORMAL
ALBUMIN SERPL-MCNC: 2.7 G/DL (ref 3.5–5)
ALBUMIN/GLOB SERPL: 1 (ref 1.1–2.2)
ALP SERPL-CCNC: 63 U/L (ref 45–117)
ALT SERPL-CCNC: 14 U/L (ref 12–78)
ANION GAP SERPL CALC-SCNC: 9 MMOL/L (ref 5–15)
AST SERPL W P-5'-P-CCNC: 19 U/L (ref 15–37)
BILIRUB SERPL-MCNC: 0.6 MG/DL (ref 0.2–1)
BLD PROD TYP BPU: NORMAL
BLOOD GROUP ANTIBODIES SERPL: NEGATIVE
BPU ID: NORMAL
BUN SERPL-MCNC: 49 MG/DL (ref 6–20)
BUN/CREAT SERPL: 37 (ref 12–20)
CA-I BLD-MCNC: 8.2 MG/DL (ref 8.5–10.1)
CHLORIDE SERPL-SCNC: 115 MMOL/L (ref 97–108)
CO2 SERPL-SCNC: 24 MMOL/L (ref 21–32)
CREAT SERPL-MCNC: 1.32 MG/DL (ref 0.55–1.02)
CROSSMATCH RESULT,%XM: NORMAL
ERYTHROCYTE [DISTWIDTH] IN BLOOD BY AUTOMATED COUNT: 15.7 % (ref 11.5–14.5)
GLOBULIN SER CALC-MCNC: 2.6 G/DL (ref 2–4)
GLUCOSE BLD STRIP.AUTO-MCNC: 125 MG/DL (ref 65–100)
GLUCOSE BLD STRIP.AUTO-MCNC: 174 MG/DL (ref 65–100)
GLUCOSE BLD STRIP.AUTO-MCNC: 175 MG/DL (ref 65–100)
GLUCOSE SERPL-MCNC: 126 MG/DL (ref 65–100)
HCT VFR BLD AUTO: 24.4 % (ref 35–47)
HGB BLD-MCNC: 7.6 G/DL (ref 11.5–16)
MCH RBC QN AUTO: 25.3 PG (ref 26–34)
MCHC RBC AUTO-ENTMCNC: 31.1 G/DL (ref 30–36.5)
MCV RBC AUTO: 81.3 FL (ref 80–99)
NRBC # BLD: 0.03 K/UL (ref 0–0.01)
NRBC BLD-RTO: 0.4 PER 100 WBC
PERFORMED BY, TECHID: ABNORMAL
PLATELET # BLD AUTO: 158 K/UL (ref 150–400)
PMV BLD AUTO: 10.2 FL (ref 8.9–12.9)
POTASSIUM SERPL-SCNC: 3.7 MMOL/L (ref 3.5–5.1)
PROT SERPL-MCNC: 5.3 G/DL (ref 6.4–8.2)
RBC # BLD AUTO: 3 M/UL (ref 3.8–5.2)
SODIUM SERPL-SCNC: 148 MMOL/L (ref 136–145)
SPECIMEN EXP DATE BLD: NORMAL
STATUS OF UNIT,%ST: NORMAL
TRANSFUSION STATUS PATIENT QL: NORMAL
UNIT DIVISION, %UDIV: 0
WBC # BLD AUTO: 8.2 K/UL (ref 3.6–11)

## 2023-01-05 PROCEDURE — 74011000250 HC RX REV CODE- 250: Performed by: FAMILY MEDICINE

## 2023-01-05 PROCEDURE — 74011000250 HC RX REV CODE- 250: Performed by: INTERNAL MEDICINE

## 2023-01-05 PROCEDURE — 82962 GLUCOSE BLOOD TEST: CPT

## 2023-01-05 PROCEDURE — 36415 COLL VENOUS BLD VENIPUNCTURE: CPT

## 2023-01-05 PROCEDURE — 74011636637 HC RX REV CODE- 636/637: Performed by: FAMILY MEDICINE

## 2023-01-05 PROCEDURE — 74011250636 HC RX REV CODE- 250/636: Performed by: FAMILY MEDICINE

## 2023-01-05 PROCEDURE — 80053 COMPREHEN METABOLIC PANEL: CPT

## 2023-01-05 PROCEDURE — C9113 INJ PANTOPRAZOLE SODIUM, VIA: HCPCS | Performed by: FAMILY MEDICINE

## 2023-01-05 PROCEDURE — 74011250637 HC RX REV CODE- 250/637: Performed by: FAMILY MEDICINE

## 2023-01-05 PROCEDURE — 85027 COMPLETE CBC AUTOMATED: CPT

## 2023-01-05 PROCEDURE — 65270000029 HC RM PRIVATE

## 2023-01-05 PROCEDURE — A9560 TC99M LABELED RBC: HCPCS

## 2023-01-05 RX ORDER — SODIUM CHLORIDE 450 MG/100ML
75 INJECTION, SOLUTION INTRAVENOUS CONTINUOUS
Status: DISCONTINUED | OUTPATIENT
Start: 2023-01-05 | End: 2023-01-07

## 2023-01-05 RX ORDER — DEXTROSE MONOHYDRATE AND SODIUM CHLORIDE 5; .45 G/100ML; G/100ML
50 INJECTION, SOLUTION INTRAVENOUS CONTINUOUS
Status: DISCONTINUED | OUTPATIENT
Start: 2023-01-05 | End: 2023-01-05

## 2023-01-05 RX ORDER — AMIODARONE HYDROCHLORIDE 200 MG/1
100 TABLET ORAL DAILY
Status: DISCONTINUED | OUTPATIENT
Start: 2023-01-05 | End: 2023-01-09 | Stop reason: HOSPADM

## 2023-01-05 RX ADMIN — SODIUM CHLORIDE 75 ML/HR: 4.5 INJECTION, SOLUTION INTRAVENOUS at 12:19

## 2023-01-05 RX ADMIN — SODIUM BICARBONATE 325 MG: 650 TABLET ORAL at 20:30

## 2023-01-05 RX ADMIN — INSULIN LISPRO 3 UNITS: 100 INJECTION, SOLUTION INTRAVENOUS; SUBCUTANEOUS at 12:12

## 2023-01-05 RX ADMIN — SODIUM CHLORIDE, PRESERVATIVE FREE 40 MG: 5 INJECTION INTRAVENOUS at 08:21

## 2023-01-05 RX ADMIN — POLYETHYLENE GLYCOL 3350, SODIUM SULFATE ANHYDROUS, SODIUM BICARBONATE, SODIUM CHLORIDE, POTASSIUM CHLORIDE 2000 ML: 236; 22.74; 6.74; 5.86; 2.97 POWDER, FOR SOLUTION ORAL at 20:32

## 2023-01-05 RX ADMIN — SODIUM CHLORIDE, PRESERVATIVE FREE 40 MG: 5 INJECTION INTRAVENOUS at 20:30

## 2023-01-05 RX ADMIN — SODIUM BICARBONATE 325 MG: 650 TABLET ORAL at 08:21

## 2023-01-05 RX ADMIN — FERROUS SULFATE TAB 325 MG (65 MG ELEMENTAL FE) 325 MG: 325 (65 FE) TAB at 20:30

## 2023-01-05 RX ADMIN — CARVEDILOL 18.75 MG: 12.5 TABLET, FILM COATED ORAL at 08:21

## 2023-01-05 RX ADMIN — AMIODARONE HYDROCHLORIDE 100 MG: 200 TABLET ORAL at 12:18

## 2023-01-05 RX ADMIN — SODIUM CHLORIDE 75 ML/HR: 9 INJECTION, SOLUTION INTRAVENOUS at 05:50

## 2023-01-05 RX ADMIN — HYDRALAZINE HYDROCHLORIDE 25 MG: 25 TABLET, FILM COATED ORAL at 08:22

## 2023-01-05 RX ADMIN — FERROUS SULFATE TAB 325 MG (65 MG ELEMENTAL FE) 325 MG: 325 (65 FE) TAB at 08:21

## 2023-01-05 RX ADMIN — AMLODIPINE BESYLATE 5 MG: 5 TABLET ORAL at 08:21

## 2023-01-05 RX ADMIN — CARVEDILOL 18.75 MG: 12.5 TABLET, FILM COATED ORAL at 20:31

## 2023-01-05 RX ADMIN — HYDRALAZINE HYDROCHLORIDE 25 MG: 25 TABLET, FILM COATED ORAL at 20:30

## 2023-01-05 NOTE — PROGRESS NOTES
Dual skin assessment performed with second RN. Pt has no open wounds. R ankle has +1 swelling from a fall on  at home (X-ray negative).  scar is present. Rest of skin is clean, dry, and intact.

## 2023-01-05 NOTE — ED NOTES
Bedside shift change report given to [address] [place] [address] (oncoming nurse) by Shaggy Lord (offgoing nurse). Report included the following information SBAR, ED Summary, MAR, and Recent Results. Patient has bed assigned at this time. Waiting to receive telemetry box.

## 2023-01-05 NOTE — PROGRESS NOTES
General Daily Progress Note          Patient Name:   Cy Richards       YOB: 1935       Age:  80 y.o. Admit Date: 1/3/2023      Subjective:     Patient is a 80y.o. year old female with PMH of HTN, CHF, chronic A. fib CKI stage 3b, and type II DM presents to ED with complaint of bloody stool that began Sunday. She is on Eliquis for A. fib patient experiences bloody stool whenever she attempts to eat or drink water. She states she has not been able to eat since Sunday. The blood is described as a mix of dark and bright red at different times. Patient had a colonoscopy done >5 years ago which did not reveal any abnormalities. Per daughter, Nora Layne, last meal patient had was shrimp fried rice from unknown restaurant. Patient denies fever and abdominal pain. Only stated pain is in her right ankle, which she sprained. Hgb 5.7 in ED       1/4    After blood transfusion hemoglobin stable    1/5    Patient was seen and examined in room today. She was resting comfortably in bed. Patient reports feeling better. She has been tolerating clear liquid diet well. Denies any nausea, vomiting, constipation, or diarrhea. No blood in stool. Hemoglobin 7.6 today. Na 148, Cl 115, Ca 8.2.       Objective:     Visit Vitals  /66 (BP 1 Location: Left upper arm, BP Patient Position: At rest)   Pulse 70   Temp 98.4 °F (36.9 °C)   Resp 20   Ht 4' 11\" (1.499 m)   Wt 62 kg (136 lb 11 oz)   SpO2 98%   BMI 27.61 kg/m²        Recent Results (from the past 24 hour(s))   GLUCOSE, POC    Collection Time: 01/04/23  4:06 PM   Result Value Ref Range    Glucose (POC) 131 (H) 65 - 100 mg/dL    Performed by Deacon Harvey, [address], POC    Collection Time: 01/04/23  9:19 PM   Result Value Ref Range    Glucose (POC) 133 (H) 65 - 100 mg/dL    Performed by Adelaide Eagle    CBC W/O DIFF    Collection Time: 01/05/23  6:46 AM   Result Value Ref Range    WBC 8.2 3.6 - 11.0 K/uL    RBC 3.00 (L) 3.80 - 5.20 M/uL    HGB 7.6 (L) 11.5 - 16.0 g/dL    HCT 24.4 (L) 35.0 - 47.0 %    MCV 81.3 80.0 - 99.0 FL    MCH 25.3 (L) 26.0 - 34.0 PG    MCHC 31.1 30.0 - 36.5 g/dL    RDW 15.7 (H) 11.5 - 14.5 %    PLATELET 694 120 - 668 K/uL    MPV 10.2 8.9 - 12.9 FL    NRBC 0.4 (H) 0.0  WBC    ABSOLUTE NRBC 0.03 (H) 0.00 - 6.75 K/uL   METABOLIC PANEL, COMPREHENSIVE    Collection Time: 01/05/23  6:46 AM   Result Value Ref Range    Sodium 148 (H) 136 - 145 mmol/L    Potassium 3.7 3.5 - 5.1 mmol/L    Chloride 115 (H) 97 - 108 mmol/L    CO2 24 21 - 32 mmol/L    Anion gap 9 5 - 15 mmol/L    Glucose 126 (H) 65 - 100 mg/dL    BUN 49 (H) 6 - 20 mg/dL    Creatinine 1.32 (H) 0.55 - 1.02 mg/dL    BUN/Creatinine ratio 37 (H) 12 - 20      eGFR 39 (L) >60 ml/min/1.73m2    Calcium 8.2 (L) 8.5 - 10.1 mg/dL    Bilirubin, total 0.6 0.2 - 1.0 mg/dL    AST (SGOT) 19 15 - 37 U/L    ALT (SGPT) 14 12 - 78 U/L    Alk. phosphatase 63 45 - 117 U/L    Protein, total 5.3 (L) 6.4 - 8.2 g/dL    Albumin 2.7 (L) 3.5 - 5.0 g/dL    Globulin 2.6 2.0 - 4.0 g/dL    A-G Ratio 1.0 (L) 1.1 - 2.2     GLUCOSE, POC    Collection Time: 01/05/23  7:45 AM   Result Value Ref Range    Glucose (POC) 125 (H) 65 - 100 mg/dL    Performed by Magaly Arita      [unfilled]      Review of Systems    Constitutional: Negative for chills and fever. HENT: Negative. Eyes: Negative. Respiratory: Negative. Cardiovascular: Negative. Gastrointestinal: Negative for abdominal pain and nausea. Skin: Negative. Neurological: Negative. Physical Exam:      Constitutional: pt is oriented to person, place, and time. HENT:   Head: Normocephalic and atraumatic. Eyes: Pupils are equal, round, and reactive to light. EOM are normal.   Cardiovascular: Normal rate, regular rhythm and normal heart sounds. Pulmonary/Chest: Breath sounds normal. No wheezes. No rales. Exhibits no tenderness. Abdominal: Soft. Bowel sounds are normal. There is no abdominal tenderness.  There is no rebound and no guarding. Musculoskeletal: Normal range of motion. Neurological: pt is alert and oriented to person, place, and time. CT ABD PELV WO CONT   Final Result   1. Several chronic findings including a nonobstructive left lower pole renal   stone, moderate hiatal hernia, bilateral renal cysts, sigmoid diverticulosis,   and lumbar spine degenerative changes. 2.  No acute pathology in the abdomen or pelvis. Recent Results (from the past 24 hour(s))   GLUCOSE, POC    Collection Time: 01/04/23  4:06 PM   Result Value Ref Range    Glucose (POC) 131 (H) 65 - 100 mg/dL    Performed by Deacon 264, [address], POC    Collection Time: 01/04/23  9:19 PM   Result Value Ref Range    Glucose (POC) 133 (H) 65 - 100 mg/dL    Performed by Nicola Montes    CBC W/O DIFF    Collection Time: 01/05/23  6:46 AM   Result Value Ref Range    WBC 8.2 3.6 - 11.0 K/uL    RBC 3.00 (L) 3.80 - 5.20 M/uL    HGB 7.6 (L) 11.5 - 16.0 g/dL    HCT 24.4 (L) 35.0 - 47.0 %    MCV 81.3 80.0 - 99.0 FL    MCH 25.3 (L) 26.0 - 34.0 PG    MCHC 31.1 30.0 - 36.5 g/dL    RDW 15.7 (H) 11.5 - 14.5 %    PLATELET 113 669 - 411 K/uL    MPV 10.2 8.9 - 12.9 FL    NRBC 0.4 (H) 0.0  WBC    ABSOLUTE NRBC 0.03 (H) 0.00 - 6.31 K/uL   METABOLIC PANEL, COMPREHENSIVE    Collection Time: 01/05/23  6:46 AM   Result Value Ref Range    Sodium 148 (H) 136 - 145 mmol/L    Potassium 3.7 3.5 - 5.1 mmol/L    Chloride 115 (H) 97 - 108 mmol/L    CO2 24 21 - 32 mmol/L    Anion gap 9 5 - 15 mmol/L    Glucose 126 (H) 65 - 100 mg/dL    BUN 49 (H) 6 - 20 mg/dL    Creatinine 1.32 (H) 0.55 - 1.02 mg/dL    BUN/Creatinine ratio 37 (H) 12 - 20      eGFR 39 (L) >60 ml/min/1.73m2    Calcium 8.2 (L) 8.5 - 10.1 mg/dL    Bilirubin, total 0.6 0.2 - 1.0 mg/dL    AST (SGOT) 19 15 - 37 U/L    ALT (SGPT) 14 12 - 78 U/L    Alk.  phosphatase 63 45 - 117 U/L    Protein, total 5.3 (L) 6.4 - 8.2 g/dL    Albumin 2.7 (L) 3.5 - 5.0 g/dL    Globulin 2.6 2.0 - 4.0 g/dL    A-G Ratio 1.0 (L) 1.1 - 2. 2     GLUCOSE, POC    Collection Time: 01/05/23  7:45 AM   Result Value Ref Range    Glucose (POC) 125 (H) 65 - 100 mg/dL    Performed by Malvin Reddy        Results       ** No results found for the last 336 hours. **             Labs:     Recent Labs     01/05/23  0646 01/04/23  0304   WBC 8.2 10.9   HGB 7.6* 8.0*   HCT 24.4* 25.4*    164       Recent Labs     01/05/23  0646 01/04/23  0304 01/03/23  1418   * 145 144   K 3.7 3.7 4.3   * 111* 108   CO2 24 27 27   BUN 49* 71* 72*   CREA 1.32* 1.74* 1.86*   * 106* 185*   CA 8.2* 8.7 9.2       Recent Labs     01/05/23  0646 01/04/23  0304 01/03/23  1418   ALT 14 17 18   AP 63 73 82   TBILI 0.6 0.6 0.4   TP 5.3* 6.2* 6.0*   ALB 2.7* 3.0* 3.2*   GLOB 2.6 3.2 2.8       Recent Labs     01/03/23  1514   INR 1.1   PTP 14.8*        No results for input(s): FE, TIBC, PSAT, FERR in the last 72 hours. No results found for: FOL, RBCF   No results for input(s): PH, PCO2, PO2 in the last 72 hours. No results for input(s): CPK, CKNDX, TROIQ in the last 72 hours.     No lab exists for component: CPKMB  Lab Results   Component Value Date/Time    Cholesterol, total 181 11/05/2020 12:48 PM    HDL Cholesterol 66 11/05/2020 12:48 PM    LDL, calculated 96 11/05/2020 12:48 PM    LDL, calculated 91 03/11/2019 03:16 PM    Triglyceride 105 11/05/2020 12:48 PM     Lab Results   Component Value Date/Time    Glucose (POC) 125 (H) 01/05/2023 07:45 AM    Glucose (POC) 133 (H) 01/04/2023 09:19 PM    Glucose (POC) 131 (H) 01/04/2023 04:06 PM    Glucose (POC) 101 (H) 01/04/2023 11:13 AM    Glucose (POC) 153 (H) 01/04/2023 07:33 AM     Lab Results   Component Value Date/Time    Color Yellow/Straw 01/03/2023 02:52 PM    Appearance Clear 01/03/2023 02:52 PM    Specific gravity 1.018 01/03/2023 02:52 PM    Specific gravity 1.019 06/11/2021 02:45 PM    pH (UA) 5.0 01/03/2023 02:52 PM    Protein Negative 01/03/2023 02:52 PM    Glucose Negative 01/03/2023 02:52 PM    Ketone Negative 01/03/2023 02:52 PM    Bilirubin Negative 01/03/2023 02:52 PM    Urobilinogen 0.1 01/03/2023 02:52 PM    Nitrites Negative 01/03/2023 02:52 PM    Leukocyte Esterase Small (A) 01/03/2023 02:52 PM    Epithelial cells Few 01/03/2023 02:52 PM    Bacteria Negative 01/03/2023 02:52 PM    WBC 5-10 01/03/2023 02:52 PM    RBC 0-5 01/03/2023 02:52 PM         Assessment:     Acute GI bleed of unspecified location  2. Acute blood loss anemia, improving  3. CKD stage 3b  4. Diabetes  5. CHF  6.    Hypertension    Plan:   Eliquis held by GI  Cardiology consult pending regarding alternatives to thromboembolic prophylaxis for atrial fibrillation  Continue protonix 40 mg IV  Continue clear liquid diet  Elective colonoscopy next week per GI  Monitor H&H and renal function  Repeat the labs in the morning    If hemoglobin stable by tomorrow patient can be discharged home as GI has no plans to do any work-up at this time        Current Facility-Administered Medications:     0.9% sodium chloride infusion 250 mL, 250 mL, IntraVENous, PRN, Maddie Reynoso MD    amiodarone (CORDARONE) tablet 200 mg, 200 mg, Oral, DAILY, Maddie Reynoso MD    amLODIPine (NORVASC) tablet 5 mg, 5 mg, Oral, DAILY, Maddie Reynoso MD, 5 mg at 01/05/23 2888    carvediloL (COREG) tablet 18.75 mg, 18.75 mg, Oral, BID, Maddie Reynoso MD, 18.75 mg at 01/05/23 7808    ferrous sulfate tablet 325 mg, 325 mg, Oral, BID, Maddie Reynoso MD, 325 mg at 01/05/23 3615    hydrALAZINE (APRESOLINE) tablet 25 mg, 25 mg, Oral, BID, Maddie Reynoso MD, 25 mg at 01/05/23 7433    sodium bicarbonate tablet 325 mg, 325 mg, Oral, BID, Maddie Reynoso MD, 325 mg at 01/05/23 6431    insulin lispro (HUMALOG) injection, , SubCUTAneous, AC&HS, Maddie Reynoso MD, 3 Units at 01/04/23 0800    glucose chewable tablet 16 g, 4 Tablet, Oral, PRN, Maddie Reynoso MD    glucagon (GLUCAGEN) injection 1 mg, 1 mg, IntraMUSCular, PRN, Traci Gracia MD    0.9% sodium chloride infusion, 75 mL/hr, IntraVENous, CONTINUOUS, Maddie Reynoso MD, Last Rate: 75 mL/hr at 01/05/23 0550, 75 mL/hr at 01/05/23 0550    acetaminophen (TYLENOL) tablet 650 mg, 650 mg, Oral, Q6H PRN **OR** acetaminophen (TYLENOL) suppository 650 mg, 650 mg, Rectal, Q6H PRN, Maddie eRynoso MD    polyethylene glycol (MIRALAX) packet 17 g, 17 g, Oral, DAILY PRN, Maddie Reynoso MD    ondansetron (ZOFRAN ODT) tablet 4 mg, 4 mg, Oral, Q8H PRN **OR** ondansetron (ZOFRAN) injection 4 mg, 4 mg, IntraVENous, Q6H PRN, Maddie Reynoso MD    pantoprazole (PROTONIX) 40 mg in 0.9% sodium chloride 10 mL injection, 40 mg, IntraVENous, Q12H, Maddie Reynoso MD, 40 mg at 01/05/23 4146

## 2023-01-05 NOTE — PROGRESS NOTES
Pharmacy called [facility], [place], which patient reports is her only pharmacy. Patient last fill of amiodarone 4/19/22 for 90 day supply. Per [facility] patient takes 1/2 tablet (100mg) daily. Dr. Joi Barker contacted with info regarding recent EKG and elevated heart rate. He wished to restart amiodarone 100mg po Daily.

## 2023-01-05 NOTE — PROGRESS NOTES
Problem: Pressure Injury - Risk of  Goal: *Prevention of pressure injury  Description: Document Ramon Scale and appropriate interventions in the flowsheet. Outcome: Progressing Towards Goal  Note: Pressure Injury Interventions:    Moisture Interventions: Absorbent underpads, Apply protective barrier, creams and emollients, Internal/External urinary devices, Minimize layers    Activity Interventions: Increase time out of bed, PT/OT evaluation    Mobility Interventions: Float heels, PT/OT evaluation    Nutrition Interventions: Document food/fluid/supplement intake, Offer support with meals,snacks and hydration    Problem: Patient Education: Go to Patient Education Activity  Goal: Patient/Family Education  Outcome: Progressing Towards Goal     Problem: Falls - Risk of  Goal: *Absence of Falls  Description: Document Miguel A Fall Risk and appropriate interventions in the flowsheet.   Outcome: Progressing Towards Goal  Note: Fall Risk Interventions:

## 2023-01-05 NOTE — MED STUDENT NOTES
General Daily Progress Note          Patient Name:   Samson Birch       YOB: 1935       Age:  80 y.o. Admit Date: 1/3/2023      Subjective:     Patient is a 80y.o. year old female with PMH of HTN, CHF, chronic A. fib CKI stage 3b, and type II DM presents to ED with complaint of bloody stool that began Sunday. She is on Eliquis for A. fib patient experiences bloody stool whenever she attempts to eat or drink water. She states she has not been able to eat since Sunday. The blood is described as a mix of dark and bright red at different times. Patient had a colonoscopy done >5 years ago which did not reveal any abnormalities. Per daughter, Vicky Darby, last meal patient had was shrimp fried rice from unknown restaurant. Patient denies fever and abdominal pain. Only stated pain is in her right ankle, which she sprained. Hgb 5.7 in ED       1/4    After blood transfusion hemoglobin stable    1/5    Patient was seen and examined in room today. She was resting comfortably in bed. Patient reports feeling better. She has been tolerating clear liquid diet well. Denies any nausea, vomiting, constipation, or diarrhea. No blood in stool. Hemoglobin 7.6 today. Na 148, Cl 115, Ca 8.2.       Objective:     Visit Vitals  BP (!) 130/56 (BP 1 Location: Left upper arm, BP Patient Position: At rest;Lying right side)   Pulse 96   Temp 97.7 °F (36.5 °C)   Resp 20   Ht 4' 11\" (1.499 m)   Wt 62 kg (136 lb 11 oz)   SpO2 97%   BMI 27.61 kg/m²        Recent Results (from the past 24 hour(s))   GLUCOSE, POC    Collection Time: 01/04/23 11:13 AM   Result Value Ref Range    Glucose (POC) 101 (H) 65 - 100 mg/dL    Performed by [address] [address], POC    Collection Time: 01/04/23  4:06 PM   Result Value Ref Range    Glucose (POC) 131 (H) 65 - 100 mg/dL    Performed by [address], POC    Collection Time: 01/04/23  9:19 PM   Result Value Ref Range    Glucose (POC) 133 (H) 65 - 100 mg/dL    Performed by Lang Ray    CBC W/O DIFF    Collection Time: 01/05/23  6:46 AM   Result Value Ref Range    WBC 8.2 3.6 - 11.0 K/uL    RBC 3.00 (L) 3.80 - 5.20 M/uL    HGB 7.6 (L) 11.5 - 16.0 g/dL    HCT 24.4 (L) 35.0 - 47.0 %    MCV 81.3 80.0 - 99.0 FL    MCH 25.3 (L) 26.0 - 34.0 PG    MCHC 31.1 30.0 - 36.5 g/dL    RDW 15.7 (H) 11.5 - 14.5 %    PLATELET 539 427 - 725 K/uL    MPV 10.2 8.9 - 12.9 FL    NRBC 0.4 (H) 0.0  WBC    ABSOLUTE NRBC 0.03 (H) 0.00 - 9.14 K/uL   METABOLIC PANEL, COMPREHENSIVE    Collection Time: 01/05/23  6:46 AM   Result Value Ref Range    Sodium 148 (H) 136 - 145 mmol/L    Potassium 3.7 3.5 - 5.1 mmol/L    Chloride 115 (H) 97 - 108 mmol/L    CO2 24 21 - 32 mmol/L    Anion gap 9 5 - 15 mmol/L    Glucose 126 (H) 65 - 100 mg/dL    BUN 49 (H) 6 - 20 mg/dL    Creatinine 1.32 (H) 0.55 - 1.02 mg/dL    BUN/Creatinine ratio 37 (H) 12 - 20      eGFR 39 (L) >60 ml/min/1.73m2    Calcium 8.2 (L) 8.5 - 10.1 mg/dL    Bilirubin, total 0.6 0.2 - 1.0 mg/dL    AST (SGOT) 19 15 - 37 U/L    ALT (SGPT) 14 12 - 78 U/L    Alk. phosphatase 63 45 - 117 U/L    Protein, total 5.3 (L) 6.4 - 8.2 g/dL    Albumin 2.7 (L) 3.5 - 5.0 g/dL    Globulin 2.6 2.0 - 4.0 g/dL    A-G Ratio 1.0 (L) 1.1 - 2.2     GLUCOSE, POC    Collection Time: 01/05/23  7:45 AM   Result Value Ref Range    Glucose (POC) 125 (H) 65 - 100 mg/dL    Performed by Rose Mary Pereira      [unfilled]      Review of Systems    Constitutional: Negative for chills and fever. HENT: Negative. Eyes: Negative. Respiratory: Negative. Cardiovascular: Negative. Gastrointestinal: Negative for abdominal pain and nausea. Skin: Negative. Neurological: Negative. Physical Exam:      Constitutional: pt is oriented to person, place, and time. HENT:   Head: Normocephalic and atraumatic. Eyes: Pupils are equal, round, and reactive to light. EOM are normal.   Cardiovascular: Normal rate, regular rhythm and normal heart sounds.    Pulmonary/Chest: Breath sounds normal. No wheezes. No rales. Exhibits no tenderness. Abdominal: Soft. Bowel sounds are normal. There is no abdominal tenderness. There is no rebound and no guarding. Musculoskeletal: Normal range of motion. Neurological: pt is alert and oriented to person, place, and time. CT ABD PELV WO CONT   Final Result   1. Several chronic findings including a nonobstructive left lower pole renal   stone, moderate hiatal hernia, bilateral renal cysts, sigmoid diverticulosis,   and lumbar spine degenerative changes. 2.  No acute pathology in the abdomen or pelvis.            Recent Results (from the past 24 hour(s))   GLUCOSE, POC    Collection Time: 01/04/23 11:13 AM   Result Value Ref Range    Glucose (POC) 101 (H) 65 - 100 mg/dL    Performed by [employer] [address], POC    Collection Time: 01/04/23  4:06 PM   Result Value Ref Range    Glucose (POC) 131 (H) 65 - 100 mg/dL    Performed by [facility] [facility] [address], POC    Collection Time: 01/04/23  9:19 PM   Result Value Ref Range    Glucose (POC) 133 (H) 65 - 100 mg/dL    Performed by Emelyn Wellington    CBC W/O DIFF    Collection Time: 01/05/23  6:46 AM   Result Value Ref Range    WBC 8.2 3.6 - 11.0 K/uL    RBC 3.00 (L) 3.80 - 5.20 M/uL    HGB 7.6 (L) 11.5 - 16.0 g/dL    HCT 24.4 (L) 35.0 - 47.0 %    MCV 81.3 80.0 - 99.0 FL    MCH 25.3 (L) 26.0 - 34.0 PG    MCHC 31.1 30.0 - 36.5 g/dL    RDW 15.7 (H) 11.5 - 14.5 %    PLATELET 011 384 - 586 K/uL    MPV 10.2 8.9 - 12.9 FL    NRBC 0.4 (H) 0.0  WBC    ABSOLUTE NRBC 0.03 (H) 0.00 - 7.17 K/uL   METABOLIC PANEL, COMPREHENSIVE    Collection Time: 01/05/23  6:46 AM   Result Value Ref Range    Sodium 148 (H) 136 - 145 mmol/L    Potassium 3.7 3.5 - 5.1 mmol/L    Chloride 115 (H) 97 - 108 mmol/L    CO2 24 21 - 32 mmol/L    Anion gap 9 5 - 15 mmol/L    Glucose 126 (H) 65 - 100 mg/dL    BUN 49 (H) 6 - 20 mg/dL    Creatinine 1.32 (H) 0.55 - 1.02 mg/dL    BUN/Creatinine ratio 37 (H) 12 - 20      eGFR 39 (L) >60 ml/min/1.73m2    Calcium 8.2 (L) 8.5 - 10.1 mg/dL    Bilirubin, total 0.6 0.2 - 1.0 mg/dL    AST (SGOT) 19 15 - 37 U/L    ALT (SGPT) 14 12 - 78 U/L    Alk. phosphatase 63 45 - 117 U/L    Protein, total 5.3 (L) 6.4 - 8.2 g/dL    Albumin 2.7 (L) 3.5 - 5.0 g/dL    Globulin 2.6 2.0 - 4.0 g/dL    A-G Ratio 1.0 (L) 1.1 - 2.2     GLUCOSE, POC    Collection Time: 01/05/23  7:45 AM   Result Value Ref Range    Glucose (POC) 125 (H) 65 - 100 mg/dL    Performed by Ashley Pérez        Results       ** No results found for the last 336 hours. **             Labs:     Recent Labs     01/05/23  0646 01/04/23  0304   WBC 8.2 10.9   HGB 7.6* 8.0*   HCT 24.4* 25.4*    164       Recent Labs     01/05/23  0646 01/04/23  0304 01/03/23  1418   * 145 144   K 3.7 3.7 4.3   * 111* 108   CO2 24 27 27   BUN 49* 71* 72*   CREA 1.32* 1.74* 1.86*   * 106* 185*   CA 8.2* 8.7 9.2       Recent Labs     01/05/23  0646 01/04/23  0304 01/03/23  1418   ALT 14 17 18   AP 63 73 82   TBILI 0.6 0.6 0.4   TP 5.3* 6.2* 6.0*   ALB 2.7* 3.0* 3.2*   GLOB 2.6 3.2 2.8       Recent Labs     01/03/23  1514   INR 1.1   PTP 14.8*        No results for input(s): FE, TIBC, PSAT, FERR in the last 72 hours. No results found for: FOL, RBCF   No results for input(s): PH, PCO2, PO2 in the last 72 hours. No results for input(s): CPK, CKNDX, TROIQ in the last 72 hours.     No lab exists for component: CPKMB  Lab Results   Component Value Date/Time    Cholesterol, total 181 11/05/2020 12:48 PM    HDL Cholesterol 66 11/05/2020 12:48 PM    LDL, calculated 96 11/05/2020 12:48 PM    LDL, calculated 91 03/11/2019 03:16 PM    Triglyceride 105 11/05/2020 12:48 PM     Lab Results   Component Value Date/Time    Glucose (POC) 125 (H) 01/05/2023 07:45 AM    Glucose (POC) 133 (H) 01/04/2023 09:19 PM    Glucose (POC) 131 (H) 01/04/2023 04:06 PM    Glucose (POC) 101 (H) 01/04/2023 11:13 AM    Glucose (POC) 153 (H) 01/04/2023 07:33 AM     Lab Results   Component Value Date/Time    Color Yellow/Straw 01/03/2023 02:52 PM    Appearance Clear 01/03/2023 02:52 PM    Specific gravity 1.018 01/03/2023 02:52 PM    Specific gravity 1.019 06/11/2021 02:45 PM    pH (UA) 5.0 01/03/2023 02:52 PM    Protein Negative 01/03/2023 02:52 PM    Glucose Negative 01/03/2023 02:52 PM    Ketone Negative 01/03/2023 02:52 PM    Bilirubin Negative 01/03/2023 02:52 PM    Urobilinogen 0.1 01/03/2023 02:52 PM    Nitrites Negative 01/03/2023 02:52 PM    Leukocyte Esterase Small (A) 01/03/2023 02:52 PM    Epithelial cells Few 01/03/2023 02:52 PM    Bacteria Negative 01/03/2023 02:52 PM    WBC 5-10 01/03/2023 02:52 PM    RBC 0-5 01/03/2023 02:52 PM         Assessment:     Acute GI bleed of unspecified location  2. Acute blood loss anemia, improving  3. CKD stage 3b  4. Diabetes  5. CHF  6.    Hypertension    Plan:   Eliquis held by GI  Cardiology consult pending regarding alternatives to thromboembolic prophylaxis for atrial fibrillation  Continue protonix 40 mg IV  Continue clear liquid diet  Elective colonoscopy next week per GI  Monitor H&H and renal function  Repeat the labs in the morning        Current Facility-Administered Medications:     0.9% sodium chloride infusion 250 mL, 250 mL, IntraVENous, PRN, Maddie Reynoso MD    amiodarone (CORDARONE) tablet 200 mg, 200 mg, Oral, DAILY, Maddie Reynoso MD    amLODIPine (NORVASC) tablet 5 mg, 5 mg, Oral, DAILY, Maddie Reynoso MD, 5 mg at 01/05/23 2271    carvediloL (COREG) tablet 18.75 mg, 18.75 mg, Oral, BID, Fatimah Dupont MD, 18.75 mg at 01/05/23 2185    ferrous sulfate tablet 325 mg, 325 mg, Oral, BID, Maddie Reynoso MD, 325 mg at 01/05/23 0118    hydrALAZINE (APRESOLINE) tablet 25 mg, 25 mg, Oral, BID, Maddie Reynoso MD, 25 mg at 01/05/23 9396    sodium bicarbonate tablet 325 mg, 325 mg, Oral, BID, Maddie Reynoso MD, 325 mg at 01/05/23 9309    insulin lispro (HUMALOG) injection, , SubCUTAneous, AC&HS, Fatimah Dupont MD, 3 Units at 01/04/23 0800    glucose chewable tablet 16 g, 4 Tablet, Oral, PRN, Zen Reynoso MD    glucagon (GLUCAGEN) injection 1 mg, 1 mg, IntraMUSCular, PRN, Zen Reynoso MD    0.9% sodium chloride infusion, 75 mL/hr, IntraVENous, CONTINUOUS, Maddie Reynoso MD, Last Rate: 75 mL/hr at 01/05/23 0550, 75 mL/hr at 01/05/23 0550    acetaminophen (TYLENOL) tablet 650 mg, 650 mg, Oral, Q6H PRN **OR** acetaminophen (TYLENOL) suppository 650 mg, 650 mg, Rectal, Q6H PRN, Maddie Reynoso MD    polyethylene glycol (MIRALAX) packet 17 g, 17 g, Oral, DAILY PRN, Maddie Reynoso MD    ondansetron (ZOFRAN ODT) tablet 4 mg, 4 mg, Oral, Q8H PRN **OR** ondansetron (ZOFRAN) injection 4 mg, 4 mg, IntraVENous, Q6H PRN, Maddie Reynoso MD    pantoprazole (PROTONIX) 40 mg in 0.9% sodium chloride 10 mL injection, 40 mg, IntraVENous, Q12H, Maddie Reynoso MD, 40 mg at 01/05/23 5125

## 2023-01-05 NOTE — PROGRESS NOTES
Contacted  regarding pts dietary status. Confirmed pt is on a all clear liquid diet at this time and to continue monitoring for any bloody stools.

## 2023-01-05 NOTE — PROGRESS NOTES
Problem: Pressure Injury - Risk of  Goal: *Prevention of pressure injury  Description: Document Ramon Scale and appropriate interventions in the flowsheet. Outcome: Progressing Towards Goal  Note: Pressure Injury Interventions:       Moisture Interventions: Absorbent underpads, Internal/External urinary devices, Minimize layers    Activity Interventions: Increase time out of bed    Mobility Interventions: Float heels    Nutrition Interventions: Document food/fluid/supplement intake, Offer support with meals,snacks and hydration                     Problem: Patient Education: Go to Patient Education Activity  Goal: Patient/Family Education  Outcome: Progressing Towards Goal     Problem: Falls - Risk of  Goal: *Absence of Falls  Description: Document Miguel A Fall Risk and appropriate interventions in the flowsheet.   Outcome: Progressing Towards Goal  Note: Fall Risk Interventions:

## 2023-01-06 ENCOUNTER — ANESTHESIA (OUTPATIENT)
Dept: ENDOSCOPY | Age: 88
DRG: 378 | End: 2023-01-06
Payer: MEDICARE

## 2023-01-06 ENCOUNTER — APPOINTMENT (OUTPATIENT)
Dept: ENDOSCOPY | Age: 88
DRG: 813 | End: 2023-01-06
Attending: INTERNAL MEDICINE
Payer: MEDICARE

## 2023-01-06 ENCOUNTER — ANESTHESIA EVENT (OUTPATIENT)
Dept: ENDOSCOPY | Age: 88
DRG: 378 | End: 2023-01-06
Payer: MEDICARE

## 2023-01-06 LAB
ALBUMIN SERPL-MCNC: 2.6 G/DL (ref 3.5–5)
ALBUMIN/GLOB SERPL: 0.8 (ref 1.1–2.2)
ALP SERPL-CCNC: 62 U/L (ref 45–117)
ALT SERPL-CCNC: 16 U/L (ref 12–78)
ANION GAP SERPL CALC-SCNC: 6 MMOL/L (ref 5–15)
AST SERPL W P-5'-P-CCNC: 24 U/L (ref 15–37)
BILIRUB SERPL-MCNC: 0.5 MG/DL (ref 0.2–1)
BUN SERPL-MCNC: 23 MG/DL (ref 6–20)
BUN/CREAT SERPL: 22 (ref 12–20)
CA-I BLD-MCNC: 8.6 MG/DL (ref 8.5–10.1)
CHLORIDE SERPL-SCNC: 116 MMOL/L (ref 97–108)
CO2 SERPL-SCNC: 23 MMOL/L (ref 21–32)
CREAT SERPL-MCNC: 1.04 MG/DL (ref 0.55–1.02)
ERYTHROCYTE [DISTWIDTH] IN BLOOD BY AUTOMATED COUNT: 16 % (ref 11.5–14.5)
GLOBULIN SER CALC-MCNC: 3.1 G/DL (ref 2–4)
GLUCOSE BLD STRIP.AUTO-MCNC: 160 MG/DL (ref 65–100)
GLUCOSE BLD STRIP.AUTO-MCNC: 164 MG/DL (ref 65–100)
GLUCOSE BLD STRIP.AUTO-MCNC: 166 MG/DL (ref 65–100)
GLUCOSE BLD STRIP.AUTO-MCNC: 185 MG/DL (ref 65–100)
GLUCOSE SERPL-MCNC: 185 MG/DL (ref 65–100)
HCT VFR BLD AUTO: 22.2 % (ref 35–47)
HCT VFR BLD AUTO: 35.8 % (ref 35–47)
HGB BLD-MCNC: 11.5 G/DL (ref 11.5–16)
HGB BLD-MCNC: 6.9 G/DL (ref 11.5–16)
MCH RBC QN AUTO: 27.3 PG (ref 26–34)
MCHC RBC AUTO-ENTMCNC: 32.1 G/DL (ref 30–36.5)
MCV RBC AUTO: 85 FL (ref 80–99)
NRBC # BLD: 0.03 K/UL (ref 0–0.01)
NRBC BLD-RTO: 0.4 PER 100 WBC
PERFORMED BY, TECHID: ABNORMAL
PLATELET # BLD AUTO: 163 K/UL (ref 150–400)
PMV BLD AUTO: 9.7 FL (ref 8.9–12.9)
POTASSIUM SERPL-SCNC: 3.7 MMOL/L (ref 3.5–5.1)
PROT SERPL-MCNC: 5.7 G/DL (ref 6.4–8.2)
RBC # BLD AUTO: 4.21 M/UL (ref 3.8–5.2)
SODIUM SERPL-SCNC: 145 MMOL/L (ref 136–145)
WBC # BLD AUTO: 7.7 K/UL (ref 3.6–11)

## 2023-01-06 PROCEDURE — 86923 COMPATIBILITY TEST ELECTRIC: CPT

## 2023-01-06 PROCEDURE — 36415 COLL VENOUS BLD VENIPUNCTURE: CPT

## 2023-01-06 PROCEDURE — C9113 INJ PANTOPRAZOLE SODIUM, VIA: HCPCS | Performed by: FAMILY MEDICINE

## 2023-01-06 PROCEDURE — 2709999900 HC NON-CHARGEABLE SUPPLY: Performed by: INTERNAL MEDICINE

## 2023-01-06 PROCEDURE — 82962 GLUCOSE BLOOD TEST: CPT

## 2023-01-06 PROCEDURE — 65270000029 HC RM PRIVATE

## 2023-01-06 PROCEDURE — 30233N1 TRANSFUSION OF NONAUTOLOGOUS RED BLOOD CELLS INTO PERIPHERAL VEIN, PERCUTANEOUS APPROACH: ICD-10-PCS | Performed by: FAMILY MEDICINE

## 2023-01-06 PROCEDURE — 85027 COMPLETE CBC AUTOMATED: CPT

## 2023-01-06 PROCEDURE — 74011250636 HC RX REV CODE- 250/636: Performed by: NURSE ANESTHETIST, CERTIFIED REGISTERED

## 2023-01-06 PROCEDURE — 74011000250 HC RX REV CODE- 250: Performed by: NURSE ANESTHETIST, CERTIFIED REGISTERED

## 2023-01-06 PROCEDURE — 74011000250 HC RX REV CODE- 250: Performed by: FAMILY MEDICINE

## 2023-01-06 PROCEDURE — 74011636637 HC RX REV CODE- 636/637: Performed by: FAMILY MEDICINE

## 2023-01-06 PROCEDURE — 74011250636 HC RX REV CODE- 250/636: Performed by: FAMILY MEDICINE

## 2023-01-06 PROCEDURE — 76060000032 HC ANESTHESIA 0.5 TO 1 HR: Performed by: INTERNAL MEDICINE

## 2023-01-06 PROCEDURE — 36430 TRANSFUSION BLD/BLD COMPNT: CPT

## 2023-01-06 PROCEDURE — 80053 COMPREHEN METABOLIC PANEL: CPT

## 2023-01-06 PROCEDURE — 86900 BLOOD TYPING SEROLOGIC ABO: CPT

## 2023-01-06 PROCEDURE — 74011250637 HC RX REV CODE- 250/637: Performed by: FAMILY MEDICINE

## 2023-01-06 PROCEDURE — 85018 HEMOGLOBIN: CPT

## 2023-01-06 PROCEDURE — 76040000007: Performed by: INTERNAL MEDICINE

## 2023-01-06 PROCEDURE — 0DJD8ZZ INSPECTION OF LOWER INTESTINAL TRACT, VIA NATURAL OR ARTIFICIAL OPENING ENDOSCOPIC: ICD-10-PCS | Performed by: INTERNAL MEDICINE

## 2023-01-06 PROCEDURE — P9016 RBC LEUKOCYTES REDUCED: HCPCS

## 2023-01-06 RX ORDER — LIDOCAINE HYDROCHLORIDE 20 MG/ML
INJECTION, SOLUTION EPIDURAL; INFILTRATION; INTRACAUDAL; PERINEURAL AS NEEDED
Status: DISCONTINUED | OUTPATIENT
Start: 2023-01-06 | End: 2023-01-06 | Stop reason: HOSPADM

## 2023-01-06 RX ORDER — LIDOCAINE HCL/PF 100 MG/5ML
SYRINGE (ML) INTRAVENOUS
Status: DISPENSED
Start: 2023-01-06 | End: 2023-01-07

## 2023-01-06 RX ORDER — SODIUM CHLORIDE, SODIUM LACTATE, POTASSIUM CHLORIDE, CALCIUM CHLORIDE 600; 310; 30; 20 MG/100ML; MG/100ML; MG/100ML; MG/100ML
INJECTION, SOLUTION INTRAVENOUS
Status: DISCONTINUED | OUTPATIENT
Start: 2023-01-06 | End: 2023-01-06 | Stop reason: HOSPADM

## 2023-01-06 RX ORDER — PROPOFOL 10 MG/ML
INJECTION, EMULSION INTRAVENOUS
Status: COMPLETED
Start: 2023-01-06 | End: 2023-01-06

## 2023-01-06 RX ORDER — PROPOFOL 10 MG/ML
INJECTION, EMULSION INTRAVENOUS AS NEEDED
Status: DISCONTINUED | OUTPATIENT
Start: 2023-01-06 | End: 2023-01-06 | Stop reason: HOSPADM

## 2023-01-06 RX ORDER — SODIUM CHLORIDE 9 MG/ML
250 INJECTION, SOLUTION INTRAVENOUS AS NEEDED
Status: DISCONTINUED | OUTPATIENT
Start: 2023-01-06 | End: 2023-01-09 | Stop reason: HOSPADM

## 2023-01-06 RX ADMIN — PROPOFOL 30 MG: 10 INJECTION, EMULSION INTRAVENOUS at 15:05

## 2023-01-06 RX ADMIN — FERROUS SULFATE TAB 325 MG (65 MG ELEMENTAL FE) 325 MG: 325 (65 FE) TAB at 20:39

## 2023-01-06 RX ADMIN — HYDRALAZINE HYDROCHLORIDE 25 MG: 25 TABLET, FILM COATED ORAL at 20:39

## 2023-01-06 RX ADMIN — FERROUS SULFATE TAB 325 MG (65 MG ELEMENTAL FE) 325 MG: 325 (65 FE) TAB at 09:15

## 2023-01-06 RX ADMIN — CARVEDILOL 18.75 MG: 12.5 TABLET, FILM COATED ORAL at 09:16

## 2023-01-06 RX ADMIN — AMIODARONE HYDROCHLORIDE 100 MG: 200 TABLET ORAL at 09:16

## 2023-01-06 RX ADMIN — SODIUM CHLORIDE, PRESERVATIVE FREE 40 MG: 5 INJECTION INTRAVENOUS at 09:15

## 2023-01-06 RX ADMIN — SODIUM BICARBONATE 325 MG: 650 TABLET ORAL at 09:16

## 2023-01-06 RX ADMIN — CARVEDILOL 18.75 MG: 12.5 TABLET, FILM COATED ORAL at 20:39

## 2023-01-06 RX ADMIN — SODIUM CHLORIDE, PRESERVATIVE FREE 40 MG: 5 INJECTION INTRAVENOUS at 20:39

## 2023-01-06 RX ADMIN — PROPOFOL 30 MG: 10 INJECTION, EMULSION INTRAVENOUS at 14:51

## 2023-01-06 RX ADMIN — PROPOFOL 30 MG: 10 INJECTION, EMULSION INTRAVENOUS at 14:59

## 2023-01-06 RX ADMIN — HYDRALAZINE HYDROCHLORIDE 25 MG: 25 TABLET, FILM COATED ORAL at 09:16

## 2023-01-06 RX ADMIN — SODIUM BICARBONATE 325 MG: 650 TABLET ORAL at 20:39

## 2023-01-06 RX ADMIN — SODIUM CHLORIDE, POTASSIUM CHLORIDE, SODIUM LACTATE AND CALCIUM CHLORIDE: 600; 310; 30; 20 INJECTION, SOLUTION INTRAVENOUS at 14:50

## 2023-01-06 RX ADMIN — AMLODIPINE BESYLATE 5 MG: 5 TABLET ORAL at 09:16

## 2023-01-06 RX ADMIN — PROPOFOL 20 MG: 10 INJECTION, EMULSION INTRAVENOUS at 14:55

## 2023-01-06 RX ADMIN — INSULIN LISPRO 3 UNITS: 100 INJECTION, SOLUTION INTRAVENOUS; SUBCUTANEOUS at 17:14

## 2023-01-06 RX ADMIN — LIDOCAINE HYDROCHLORIDE 40 MG: 20 INJECTION, SOLUTION EPIDURAL; INFILTRATION; INTRACAUDAL; PERINEURAL at 14:51

## 2023-01-06 NOTE — PROGRESS NOTES
Problem: Pressure Injury - Risk of  Goal: *Prevention of pressure injury  Description: Document Ramon Scale and appropriate interventions in the flowsheet.   Outcome: Progressing Towards Goal  Note: Pressure Injury Interventions:       Moisture Interventions: Absorbent underpads, Minimize layers    Activity Interventions: Increase time out of bed    Mobility Interventions: PT/OT evaluation    Nutrition Interventions: Document food/fluid/supplement intake, Offer support with meals,snacks and hydration                     Problem: Patient Education: Go to Patient Education Activity  Goal: Patient/Family Education  Outcome: Progressing Towards Goal     Problem: Patient Education: Go to Patient Education Activity  Goal: Patient/Family Education  Outcome: Progressing Towards Goal

## 2023-01-06 NOTE — PROGRESS NOTES
Physician Progress Note      PATIENT:               Cortez Murillo  CSN #:                  [id number]  :                       1935  ADMIT DATE:       1/3/2023 1:29 PM  DISCH DATE:  RESPONDING  PROVIDER #:        HORTENCIA aPrker MD          QUERY TEXT:    Patient admitted with GI bleeding and is on chronic anticoagulation. If possible, please document in the progress notes and discharge summary if you are evaluating and/or treating any of the following: The medical record reflects the following:  Risk Factors: 81 yo female with sigmoid diverticulosis, atrial fibrillation, acute blood loss anemia  Clinical Indicators: Atrial fibrillation on Eliquis; No abdominal pain, nausea, or vomiting. No history of similar. Last colonoscopy   5 years ago, cannot recall anything abnormal  Treatment: Hold Eliquis. Consult cardiology regarding alternatives to thromboembolic prophylaxis for atrial fibrillation    Thank you,  Xin Taylor RN, CCDS  Options provided:  -- GI bleeding associated with Eliquis  -- Bleeding unrelated to anticoagulation  -- Other - I will add my own diagnosis  -- Disagree - Not applicable / Not valid  -- Disagree - Clinically unable to determine / Unknown  -- Refer to Clinical Documentation Reviewer    PROVIDER RESPONSE TEXT:    This patient has GI bleeding associated with Eliquis.     Query created by: Lizz Beth on 2023 10:13 AM      Electronically signed by:  Mellisa Parker MD 2023 12:17 PM

## 2023-01-06 NOTE — MED STUDENT NOTES
General Daily Progress Note          Patient Name:   Marga Ray       YOB: 1935       Age:  80 y.o. Admit Date: 1/3/2023      Subjective:     Patient is a 80y.o. year old female with PMH of HTN, CHF, chronic A. fib CKI stage 3b, and type II DM presents to ED with complaint of bloody stool that began Sunday. She is on Eliquis for A. fib patient experiences bloody stool whenever she attempts to eat or drink water. She states she has not been able to eat since Sunday. The blood is described as a mix of dark and bright red at different times. Patient had a colonoscopy done >5 years ago which did not reveal any abnormalities. Per daughter, Christi Forbes, last meal patient had was shrimp fried rice from unknown restaurant. Patient denies fever and abdominal pain. Only stated pain is in her right ankle, which she sprained. Hgb 5.7 in ED       1/4    After blood transfusion hemoglobin stable    1/5    Patient was seen and examined in room today. She was resting comfortably in bed. Patient reports feeling better. She has been tolerating clear liquid diet well. Denies any nausea, vomiting, constipation, or diarrhea. No blood in stool. Hgb 7.6 today. Na 148, Cl 115, Ca 8.2.    1/6  Patient was seen and examined in room today. Patient reports feeling well. However during bowel prep last night for colonoscopy she had dark colored blood in stool, Hgb 6.9 and was transfused 1 unit of blood. Per nurse she is still having blood in stool.         Objective:     Visit Vitals  /68 (BP 1 Location: Right upper arm, BP Patient Position: Lying)   Pulse 97   Temp 98.5 °F (36.9 °C)   Resp 20   Ht 4' 11\" (1.499 m)   Wt 62.4 kg (137 lb 9.6 oz)   SpO2 100%   BMI 27.79 kg/m²        Recent Results (from the past 24 hour(s))   GLUCOSE, POC    Collection Time: 01/05/23 11:53 AM   Result Value Ref Range    Glucose (POC) 174 (H) 65 - 100 mg/dL    Performed by [id number], POC    Collection Time: 01/05/23 10:19 PM   Result Value Ref Range    Glucose (POC) 175 (H) 65 - 100 mg/dL    Performed by Ashlee Javier    HGB & HCT    Collection Time: 01/06/23 12:29 AM   Result Value Ref Range    HGB 6.9 (L) 11.5 - 16.0 g/dL    HCT 22.2 (L) 35.0 - 47.0 %   TYPE & SCREEN    Collection Time: 01/06/23 12:29 AM   Result Value Ref Range    Crossmatch Expiration 01/09/2023,2359     ABO/Rh(D) O Positive     Antibody screen Negative     Unit number [id number]     Blood component type  LR     Unit division 00     Status of unit [address] to transfuse     Crossmatch result Compatible     Unit number [id number]     Blood component type  LR     Unit division 00     Status of unit [address] to transfuse     Crossmatch result Compatible    GLUCOSE, POC    Collection Time: 01/06/23  7:40 AM   Result Value Ref Range    Glucose (POC) 185 (H) 65 - 100 mg/dL    Performed by Tomeka Pittman      [unfilled]      Review of Systems    Constitutional: Negative for chills and fever. HENT: Negative. Eyes: Negative. Respiratory: Negative. Cardiovascular: Negative. Gastrointestinal: Negative for abdominal pain and nausea. Skin: Negative. Neurological: Negative. Physical Exam:      Constitutional: pt is oriented to person, place, and time. HENT:   Head: Normocephalic and atraumatic. Eyes: Pupils are equal, round, and reactive to light. EOM are normal.   Cardiovascular: Normal rate, regular rhythm and normal heart sounds. Pulmonary/Chest: Breath sounds normal. No wheezes. No rales. Exhibits no tenderness. Abdominal: Soft. Bowel sounds are normal. There is no abdominal tenderness. There is no rebound and no guarding. Musculoskeletal: Normal range of motion. Neurological: pt is alert and oriented to person, place, and time. NM ACUTE GI BLEED SCAN   Final Result   Active GI bleed in the region of the cecum.       The findings were called to the floor nurse on 1/5/2023 at 10:30 PM by Dr. Hayley Brown. [id number]            CT ABD PELV WO CONT   Final Result   1. Several chronic findings including a nonobstructive left lower pole renal   stone, moderate hiatal hernia, bilateral renal cysts, sigmoid diverticulosis,   and lumbar spine degenerative changes. 2.  No acute pathology in the abdomen or pelvis. Recent Results (from the past 24 hour(s))   GLUCOSE, POC    Collection Time: 01/05/23 11:53 AM   Result Value Ref Range    Glucose (POC) 174 (H) 65 - 100 mg/dL    Performed by Nona Friday, POC    Collection Time: 01/05/23 10:19 PM   Result Value Ref Range    Glucose (POC) 175 (H) 65 - 100 mg/dL    Performed by Ashlee Javier    HGB & HCT    Collection Time: 01/06/23 12:29 AM   Result Value Ref Range    HGB 6.9 (L) 11.5 - 16.0 g/dL    HCT 22.2 (L) 35.0 - 47.0 %   TYPE & SCREEN    Collection Time: 01/06/23 12:29 AM   Result Value Ref Range    Crossmatch Expiration 01/09/2023,2359     ABO/Rh(D) O Positive     Antibody screen Negative     Unit number [id number]     Blood component type [facility]     Unit division 00     Status of unit [address] to transfuse     Crossmatch result Compatible     Unit number [id number]     Blood component type [facility]     Unit division 00     Status of unit [address] to transfuse     Crossmatch result Compatible    GLUCOSE, POC    Collection Time: 01/06/23  7:40 AM   Result Value Ref Range    Glucose (POC) 185 (H) 65 - 100 mg/dL    Performed by [facility]        Results       ** No results found for the last 336 hours.  **             Labs:     Recent Labs     01/06/23  0029 01/05/23  0646 01/04/23  0304   WBC  --  8.2 10.9   HGB 6.9* 7.6* 8.0*   HCT 22.2* 24.4* 25.4*   PLT  --  158 164       Recent Labs     01/05/23  0646 01/04/23  0304 01/03/23  1418   * 145 144   K 3.7 3.7 4.3   * 111* 108   CO2 24 27 27   BUN 49* 71* 72*   CREA 1.32* 1.74* 1.86*   * 106* 185*   CA 8.2* 8.7 9.2       Recent Labs     01/05/23  0646 01/04/23  0304 01/03/23  1418   ALT 14 17 18   AP 63 73 82   TBILI 0.6 0.6 0.4   TP 5.3* 6.2* 6.0*   ALB 2.7* 3.0* 3.2*   GLOB 2.6 3.2 2.8       Recent Labs     01/03/23  1514   INR 1.1   PTP 14.8*        No results for input(s): FE, TIBC, PSAT, FERR in the last 72 hours. No results found for: FOL, RBCF   No results for input(s): PH, PCO2, PO2 in the last 72 hours. No results for input(s): CPK, CKNDX, TROIQ in the last 72 hours. No lab exists for component: CPKMB  Lab Results   Component Value Date/Time    Cholesterol, total 181 11/05/2020 12:48 PM    HDL Cholesterol 66 11/05/2020 12:48 PM    LDL, calculated 96 11/05/2020 12:48 PM    LDL, calculated 91 03/11/2019 03:16 PM    Triglyceride 105 11/05/2020 12:48 PM     Lab Results   Component Value Date/Time    Glucose (POC) 185 (H) 01/06/2023 07:40 AM    Glucose (POC) 175 (H) 01/05/2023 10:19 PM    Glucose (POC) 174 (H) 01/05/2023 11:53 AM    Glucose (POC) 125 (H) 01/05/2023 07:45 AM    Glucose (POC) 133 (H) 01/04/2023 09:19 PM     Lab Results   Component Value Date/Time    Color Yellow/Straw 01/03/2023 02:52 PM    Appearance Clear 01/03/2023 02:52 PM    Specific gravity 1.018 01/03/2023 02:52 PM    Specific gravity 1.019 06/11/2021 02:45 PM    pH (UA) 5.0 01/03/2023 02:52 PM    Protein Negative 01/03/2023 02:52 PM    Glucose Negative 01/03/2023 02:52 PM    Ketone Negative 01/03/2023 02:52 PM    Bilirubin Negative 01/03/2023 02:52 PM    Urobilinogen 0.1 01/03/2023 02:52 PM    Nitrites Negative 01/03/2023 02:52 PM    Leukocyte Esterase Small (A) 01/03/2023 02:52 PM    Epithelial cells Few 01/03/2023 02:52 PM    Bacteria Negative 01/03/2023 02:52 PM    WBC 5-10 01/03/2023 02:52 PM    RBC 0-5 01/03/2023 02:52 PM         Assessment:     Acute GI bleed of unspecified location  2. Acute blood loss anemia, improving  3. CKD stage 3b  4. Diabetes  5. CHF  6. Hypertension    Plan:   Eliquis held by GI  Continue protonix 40 mg IV  NPO status for colonoscopy  Monitor H&H and renal function  Repeat labs in the morning          Current Facility-Administered Medications:     0.9% sodium chloride infusion 250 mL, 250 mL, IntraVENous, PRN, Maryjo Vasques MD    0.45% sodium chloride infusion, 75 mL/hr, IntraVENous, CONTINUOUS, Maddie Reynoso MD, Last Rate: 75 mL/hr at 01/05/23 1219, 75 mL/hr at 01/05/23 1219    amiodarone (CORDARONE) tablet 100 mg, 100 mg, Oral, DAILY, Maddie Reynoso MD, 100 mg at 01/05/23 1218    0.9% sodium chloride infusion 250 mL, 250 mL, IntraVENous, PRN, Leean Reynoso MD    amLODIPine (NORVASC) tablet 5 mg, 5 mg, Oral, DAILY, Maddie Reynoso MD, 5 mg at 01/05/23 5388    carvediloL (COREG) tablet 18.75 mg, 18.75 mg, Oral, BID, Maddie Reynoso MD, 18.75 mg at 01/05/23 2031    ferrous sulfate tablet 325 mg, 325 mg, Oral, BID, Maddie Reynoso MD, 325 mg at 01/05/23 2030    hydrALAZINE (APRESOLINE) tablet 25 mg, 25 mg, Oral, BID, Maddie Reynoso MD, 25 mg at 01/05/23 2030    sodium bicarbonate tablet 325 mg, 325 mg, Oral, BID, Maddie Reynoso MD, 325 mg at 01/05/23 2030    insulin lispro (HUMALOG) injection, , SubCUTAneous, AC&HS, Maddie Reynoso MD, 3 Units at 01/05/23 1212    glucose chewable tablet 16 g, 4 Tablet, Oral, PRN, Leena Reynoso MD    glucagon (GLUCAGEN) injection 1 mg, 1 mg, IntraMUSCular, PRN, Leena Reynoso MD    acetaminophen (TYLENOL) tablet 650 mg, 650 mg, Oral, Q6H PRN **OR** acetaminophen (TYLENOL) suppository 650 mg, 650 mg, Rectal, Q6H PRN, Maddie Reynoso MD    polyethylene glycol (MIRALAX) packet 17 g, 17 g, Oral, DAILY PRN, Maddie Reynoso MD    ondansetron (ZOFRAN ODT) tablet 4 mg, 4 mg, Oral, Q8H PRN **OR** ondansetron (ZOFRAN) injection 4 mg, 4 mg, IntraVENous, Q6H PRN, Maddie Reynoso MD    pantoprazole (PROTONIX) 40 mg in 0.9% sodium chloride 10 mL injection, 40 mg, IntraVENous, Q12H, Edgardo Mitzi Gaines MD, 40 mg at 01/05/23 2030

## 2023-01-06 NOTE — PROGRESS NOTES
Patient given bowel prep for colonoscopy in AM. After drinking about half of mixture, patient noted to be having frequent large bowel movements that appear to be almost completely dark colored blood. VSS and no acute distress. Notified Dr. Graham Cortez and orders received to withhold remaining bowel prep, draw an H&H and type and screen, and to transfuse 2 units PRBC's for HgB<7. Labs resulted with HgB 6.9 and H&H 22.2. Will transfuse per doctor's order.

## 2023-01-06 NOTE — MED STUDENT NOTES
General Daily Progress Note          Patient Name:   Andre Lopez       YOB: 1935       Age:  80 y.o. Admit Date: 1/3/2023      Subjective:     Patient is a 80y.o. year old female with PMH of HTN, CHF, chronic A. fib CKI stage 3b, and type II DM presents to ED with complaint of bloody stool that began Sunday. She is on Eliquis for A. fib patient experiences bloody stool whenever she attempts to eat or drink water. She states she has not been able to eat since Sunday. The blood is described as a mix of dark and bright red at different times. Patient had a colonoscopy done >5 years ago which did not reveal any abnormalities. Per daughter, Kyle Wright, last meal patient had was shrimp fried rice from unknown restaurant. Patient denies fever and abdominal pain. Only stated pain is in her right ankle, which she sprained. Hgb 5.7 in ED       1/4    After blood transfusion hemoglobin stable    1/5    Patient was seen and examined in room today. She was resting comfortably in bed. Patient reports feeling better. She has been tolerating clear liquid diet well. Denies any nausea, vomiting, constipation, or diarrhea. No blood in stool. Hgb 7.6 today. Na 148, Cl 115, Ca 8.2.    1/6  Patient was seen and examined in room today. Patient reports feeling well. However during bowel prep last night for colonoscopy she had dark colored blood in stool, Hgb 6.9 and was transfused 1 unit of blood. Per nurse she is still having blood in stool.      bleeding scan done shows active bleeding in the region of cecum        Objective:     Visit Vitals  /68   Pulse 97   Temp 98.5 °F (36.9 °C)   Resp 20   Ht 4' 11\" (1.499 m)   Wt 62.4 kg (137 lb 9.6 oz)   SpO2 100%   BMI 27.79 kg/m²        Recent Results (from the past 24 hour(s))   GLUCOSE, POC    Collection Time: 01/05/23 11:53 AM   Result Value Ref Range    Glucose (POC) 174 (H) 65 - 100 mg/dL    Performed by [id number], POC    Collection Time: 01/05/23 10:19 PM   Result Value Ref Range    Glucose (POC) 175 (H) 65 - 100 mg/dL    Performed by Ashlee Javier    HGB & HCT    Collection Time: 01/06/23 12:29 AM   Result Value Ref Range    HGB 6.9 (L) 11.5 - 16.0 g/dL    HCT 22.2 (L) 35.0 - 47.0 %   TYPE & SCREEN    Collection Time: 01/06/23 12:29 AM   Result Value Ref Range    Crossmatch Expiration 01/09/2023,2359     ABO/Rh(D) O Positive     Antibody screen Negative     Unit number [id number]     Blood component type  LR     Unit division 00     Status of unit [address] to transfuse     Crossmatch result Compatible     Unit number [id number]     Blood component type  LR     Unit division 00     Status of unit [address] to transfuse     Crossmatch result Compatible    GLUCOSE, POC    Collection Time: 01/06/23  7:40 AM   Result Value Ref Range    Glucose (POC) 185 (H) 65 - 100 mg/dL    Performed by Jonah Newby      [unfilled]      Review of Systems    Constitutional: Negative for chills and fever. HENT: Negative. Eyes: Negative. Respiratory: Negative. Cardiovascular: Negative. Gastrointestinal: Negative for abdominal pain and nausea. Skin: Negative. Neurological: Negative. Physical Exam:      Constitutional: pt is oriented to person, place, and time. HENT:   Head: Normocephalic and atraumatic. Eyes: Pupils are equal, round, and reactive to light. EOM are normal.   Cardiovascular: Normal rate, regular rhythm and normal heart sounds. Pulmonary/Chest: Breath sounds normal. No wheezes. No rales. Exhibits no tenderness. Abdominal: Soft. Bowel sounds are normal. There is no abdominal tenderness. There is no rebound and no guarding. Musculoskeletal: Normal range of motion. Neurological: pt is alert and oriented to person, place, and time. NM ACUTE GI BLEED SCAN   Final Result   Active GI bleed in the region of the cecum.       The findings were called to the floor nurse on 1/5/2023 at 10:30 PM by Dr. Demario Enciso. [id number]            CT ABD PELV WO CONT   Final Result   1. Several chronic findings including a nonobstructive left lower pole renal   stone, moderate hiatal hernia, bilateral renal cysts, sigmoid diverticulosis,   and lumbar spine degenerative changes. 2.  No acute pathology in the abdomen or pelvis. Recent Results (from the past 24 hour(s))   GLUCOSE, POC    Collection Time: 01/05/23 11:53 AM   Result Value Ref Range    Glucose (POC) 174 (H) 65 - 100 mg/dL    Performed by Satinder Lobato, POC    Collection Time: 01/05/23 10:19 PM   Result Value Ref Range    Glucose (POC) 175 (H) 65 - 100 mg/dL    Performed by Ashlee Javier    HGB & HCT    Collection Time: 01/06/23 12:29 AM   Result Value Ref Range    HGB 6.9 (L) 11.5 - 16.0 g/dL    HCT 22.2 (L) 35.0 - 47.0 %   TYPE & SCREEN    Collection Time: 01/06/23 12:29 AM   Result Value Ref Range    Crossmatch Expiration 01/09/2023,2359     ABO/Rh(D) O Positive     Antibody screen Negative     Unit number [id number]     Blood component type  LR     Unit division 00     Status of unit [address] to transfuse     Crossmatch result Compatible     Unit number [id number]     Blood component type  LR     Unit division 00     Status of unit [address] to transfuse     Crossmatch result Compatible    GLUCOSE, POC    Collection Time: 01/06/23  7:40 AM   Result Value Ref Range    Glucose (POC) 185 (H) 65 - 100 mg/dL    Performed by Amparo Moss        Results       ** No results found for the last 336 hours.  **             Labs:     Recent Labs     01/06/23  0029 01/05/23  0646 01/04/23  0304   WBC  --  8.2 10.9   HGB 6.9* 7.6* 8.0*   HCT 22.2* 24.4* 25.4*   PLT  --  158 164       Recent Labs     01/05/23  0646 01/04/23  0304 01/03/23  1418   * 145 144   K 3.7 3.7 4.3   * 111* 108   CO2 24 27 27   BUN 49* 71* 72* CREA 1.32* 1.74* 1.86*   * 106* 185*   CA 8.2* 8.7 9.2       Recent Labs     01/05/23  0646 01/04/23  0304 01/03/23  1418   ALT 14 17 18   AP 63 73 82   TBILI 0.6 0.6 0.4   TP 5.3* 6.2* 6.0*   ALB 2.7* 3.0* 3.2*   GLOB 2.6 3.2 2.8       Recent Labs     01/03/23  1514   INR 1.1   PTP 14.8*        No results for input(s): FE, TIBC, PSAT, FERR in the last 72 hours. No results found for: FOL, RBCF   No results for input(s): PH, PCO2, PO2 in the last 72 hours. No results for input(s): CPK, CKNDX, TROIQ in the last 72 hours. No lab exists for component: CPKMB  Lab Results   Component Value Date/Time    Cholesterol, total 181 11/05/2020 12:48 PM    HDL Cholesterol 66 11/05/2020 12:48 PM    LDL, calculated 96 11/05/2020 12:48 PM    LDL, calculated 91 03/11/2019 03:16 PM    Triglyceride 105 11/05/2020 12:48 PM     Lab Results   Component Value Date/Time    Glucose (POC) 185 (H) 01/06/2023 07:40 AM    Glucose (POC) 175 (H) 01/05/2023 10:19 PM    Glucose (POC) 174 (H) 01/05/2023 11:53 AM    Glucose (POC) 125 (H) 01/05/2023 07:45 AM    Glucose (POC) 133 (H) 01/04/2023 09:19 PM     Lab Results   Component Value Date/Time    Color Yellow/Straw 01/03/2023 02:52 PM    Appearance Clear 01/03/2023 02:52 PM    Specific gravity 1.018 01/03/2023 02:52 PM    Specific gravity 1.019 06/11/2021 02:45 PM    pH (UA) 5.0 01/03/2023 02:52 PM    Protein Negative 01/03/2023 02:52 PM    Glucose Negative 01/03/2023 02:52 PM    Ketone Negative 01/03/2023 02:52 PM    Bilirubin Negative 01/03/2023 02:52 PM    Urobilinogen 0.1 01/03/2023 02:52 PM    Nitrites Negative 01/03/2023 02:52 PM    Leukocyte Esterase Small (A) 01/03/2023 02:52 PM    Epithelial cells Few 01/03/2023 02:52 PM    Bacteria Negative 01/03/2023 02:52 PM    WBC 5-10 01/03/2023 02:52 PM    RBC 0-5 01/03/2023 02:52 PM         Assessment:     Acute GI bleed of unspecified location  2. Acute blood loss anemia, improving  3. CKD stage 3b  4. Diabetes  5. CHF  6. Hypertension  9.   Bleeding scan shows active bleeding at the region of cecum    Plan:   Eliquis held by GI  Continue protonix 40 mg IV  NPO status for colonoscopy  Monitor H&H and renal function  Repeat labs in the morning  With a GI          Current Facility-Administered Medications:     0.9% sodium chloride infusion 250 mL, 250 mL, IntraVENous, PRN, Doni Austin MD    0.45% sodium chloride infusion, 75 mL/hr, IntraVENous, CONTINUOUS, Maddie Reynoso MD, Last Rate: 75 mL/hr at 01/05/23 1219, 75 mL/hr at 01/05/23 1219    amiodarone (CORDARONE) tablet 100 mg, 100 mg, Oral, DAILY, Maddie Reynoso MD, 100 mg at 01/06/23 0916    0.9% sodium chloride infusion 250 mL, 250 mL, IntraVENous, PRN, Erika Reynoso MD    amLODIPine (NORVASC) tablet 5 mg, 5 mg, Oral, DAILY, Maddie Reynoso MD, 5 mg at 01/06/23 0916    carvediloL (COREG) tablet 18.75 mg, 18.75 mg, Oral, BID, Maddie Reynoso MD, 18.75 mg at 01/06/23 2310    ferrous sulfate tablet 325 mg, 325 mg, Oral, BID, Maddie Reynoso MD, 325 mg at 01/06/23 0915    hydrALAZINE (APRESOLINE) tablet 25 mg, 25 mg, Oral, BID, Maddie Reynoso MD, 25 mg at 01/06/23 4907    sodium bicarbonate tablet 325 mg, 325 mg, Oral, BID, Maddie Reynoso MD, 325 mg at 01/06/23 0916    insulin lispro (HUMALOG) injection, , SubCUTAneous, AC&HS, Maddie Reynoso MD, 3 Units at 01/05/23 1212    glucose chewable tablet 16 g, 4 Tablet, Oral, PRN, Erika Reynoso MD    glucagon (GLUCAGEN) injection 1 mg, 1 mg, IntraMUSCular, PRN, Erika Reynoso MD    acetaminophen (TYLENOL) tablet 650 mg, 650 mg, Oral, Q6H PRN **OR** acetaminophen (TYLENOL) suppository 650 mg, 650 mg, Rectal, Q6H PRN, Maddie Reynoso MD    polyethylene glycol (MIRALAX) packet 17 g, 17 g, Oral, DAILY PRN, Maddie Reynoso MD    ondansetron (ZOFRAN ODT) tablet 4 mg, 4 mg, Oral, Q8H PRN **OR** ondansetron (ZOFRAN) injection 4 mg, 4 mg, IntraVENous, Q6H PRN, Maddie Reynoso MD    pantoprazole (PROTONIX) 40 mg in 0.9% sodium chloride 10 mL injection, 40 mg, IntraVENous, Q12H, Maddie Reynoso MD, 40 mg at 01/06/23 1412

## 2023-01-06 NOTE — PROGRESS NOTES
Patient DCP is home alone. Hgb this am is 6.9. Patient will receive another transfusion and is NPO for colonoscopy today.

## 2023-01-06 NOTE — ANESTHESIA PREPROCEDURE EVALUATION
Relevant Problems   No relevant active problems       Anesthetic History   No history of anesthetic complications            Review of Systems / Medical History  Patient summary reviewed, nursing notes reviewed and pertinent labs reviewed    Pulmonary  Within defined limits                 Neuro/Psych   Within defined limits           Cardiovascular    Hypertension      CHF             GI/Hepatic/Renal         Renal disease: CRI       Endo/Other    Diabetes    Anemia     Other Findings   Comments: Stopped apixaban for 1-2 weeks         Past Medical History:   Diagnosis Date    CHF (congestive heart failure) (HCC)     Diabetes ([facility] [address]     Hypertension     Kidney disease        Past Surgical History:   Procedure Laterality Date    HX BREAST BIOPSY         Current Outpatient Medications   Medication Instructions    amiodarone (CORDARONE) 200 mg, Oral, DAILY    amLODIPine (NORVASC) 5 mg tablet TAKE ONE (1) TABLET BY MOUTH TWICE DAILY    aspirin delayed-release 81 mg, Oral, DAILY    Blood-Glucose Meter (Blood Glucose Monitoring) monitoring kit E11.9 check blood sugar daily    carvedilol (COREG) 12.5 mg tablet 1.5 Tablets, Oral, 2 TIMES DAILY    cetirizine (ZYRTEC) 10 mg tablet TAKE (1) TABLET DAILY.  chlorthalidone (HYGROTON) 25 mg tablet TAKE ONE TABLET BY MOUTH ONCE DAILY    Eliquis 5 mg tablet TAKE ONE TABLET BY MOUTH TWICE A DAY.     ferrous sulfate (IRON) 325 mg (65 mg iron) EC tablet TAKE 1 TABLET TWICE DAILY    glucose blood VI test strips (blood glucose test) strip E11.9 check blood sugar daily    glyBURIDE (DIABETA) 2.5 mg, Oral, DAILY BEFORE BREAKFAST    hydrALAZINE (APRESOLINE) 25 mg tablet 1 Tablet, Oral, 2 TIMES DAILY    lancets misc E11.9  check blood sugar daily    losartan (COZAAR) 100 mg, Oral, DAILY    magnesium oxide (MAG-OX) 400 mg, Oral, DAILY    sodium bicarbonate 325 mg, Oral, 2 TIMES DAILY       Current Facility-Administered Medications   Medication Dose Route Frequency    0.9% sodium chloride infusion 250 mL  250 mL IntraVENous PRN    lidocaine (PF) (XYLOCAINE) 100 mg/5 mL (2 %) injection syringe        0.45% sodium chloride infusion  75 mL/hr IntraVENous CONTINUOUS    amiodarone (CORDARONE) tablet 100 mg  100 mg Oral DAILY    0.9% sodium chloride infusion 250 mL  250 mL IntraVENous PRN    amLODIPine (NORVASC) tablet 5 mg  5 mg Oral DAILY    carvediloL (COREG) tablet 18.75 mg  18.75 mg Oral BID    ferrous sulfate tablet 325 mg  325 mg Oral BID    hydrALAZINE (APRESOLINE) tablet 25 mg  25 mg Oral BID    sodium bicarbonate tablet 325 mg  325 mg Oral BID    insulin lispro (HUMALOG) injection   SubCUTAneous AC&HS    glucose chewable tablet 16 g  4 Tablet Oral PRN    glucagon (GLUCAGEN) injection 1 mg  1 mg IntraMUSCular PRN    acetaminophen (TYLENOL) tablet 650 mg  650 mg Oral Q6H PRN    Or    acetaminophen (TYLENOL) suppository 650 mg  650 mg Rectal Q6H PRN    polyethylene glycol (MIRALAX) packet 17 g  17 g Oral DAILY PRN    ondansetron (ZOFRAN ODT) tablet 4 mg  4 mg Oral Q8H PRN    Or    ondansetron (ZOFRAN) injection 4 mg  4 mg IntraVENous Q6H PRN    pantoprazole (PROTONIX) 40 mg in 0.9% sodium chloride 10 mL injection  40 mg IntraVENous Q12H     Facility-Administered Medications Ordered in Other Encounters   Medication Dose Route Frequency    lidocaine (PF) (XYLOCAINE) 20 mg/mL (2 %) injection   IntraVENous PRN    propofoL (DIPRIVAN) 10 mg/mL injection   IntraVENous PRN    lactated Ringers infusion   IntraVENous CONTINUOUS       Patient Vitals for the past 24 hrs:   Temp Pulse Resp BP SpO2   01/06/23 1052 36.3 °C (97.4 °F) 97 20 128/62 96 %   01/06/23 0830 36.9 °C (98.5 °F) 97 20 118/68 --   01/06/23 0820 36.9 °C (98.5 °F) 97 20 118/68 100 %   01/06/23 0804 37.1 °C (98.7 °F) (!) 114 16 122/68 100 %   01/06/23 0729 37.1 °C (98.7 °F) 95 20 123/63 97 %   01/06/23 0630 37.1 °C (98.7 °F) (!) 108 26 103/60 96 %   01/06/23 0347 37.1 °C (98.7 °F) 90 16 129/66 99 % 01/06/23 0330 36.6 °C (97.9 °F) (!) 109 13 126/72 97 %   01/05/23 2257 36.5 °C (97.7 °F) (!) 109 20 115/64 98 %   01/05/23 1943 36.4 °C (97.5 °F) (!) 123 18 126/68 98 %   01/05/23 1524 36.5 °C (97.7 °F) 64 20 (!) 152/70 97 %       Lab Results   Component Value Date/Time    WBC 8.2 01/05/2023 06:46 AM    HGB 6.9 (L) 01/06/2023 12:29 AM    HCT 22.2 (L) 01/06/2023 12:29 AM    PLATELET 431 48/71/6035 06:46 AM    MCV 81.3 01/05/2023 06:46 AM     Lab Results   Component Value Date/Time    Sodium 148 (H) 01/05/2023 06:46 AM    Potassium 3.7 01/05/2023 06:46 AM    Chloride 115 (H) 01/05/2023 06:46 AM    CO2 24 01/05/2023 06:46 AM    Anion gap 9 01/05/2023 06:46 AM    Glucose 126 (H) 01/05/2023 06:46 AM    BUN 49 (H) 01/05/2023 06:46 AM    Creatinine 1.32 (H) 01/05/2023 06:46 AM    BUN/Creatinine ratio 37 (H) 01/05/2023 06:46 AM    GFR est AA 43 (L) 09/30/2021 03:35 PM    GFR est non-AA 35 (L) 09/30/2021 03:35 PM    Calcium 8.2 (L) 01/05/2023 06:46 AM     No results found for: APTT, PTP, INR, INREXT  Lab Results   Component Value Date/Time    Glucose 126 (H) 01/05/2023 06:46 AM    Glucose (POC) 160 (H) 01/06/2023 10:55 AM     Physical Exam    Airway  Mallampati: II  TM Distance: 4 - 6 cm  Neck ROM: normal range of motion   Mouth opening: Normal     Cardiovascular    Rhythm: regular  Rate: normal         Dental    Dentition: Poor dentition     Pulmonary  Breath sounds clear to auscultation               Abdominal  GI exam deferred       Other Findings            Anesthetic Plan    ASA: 3  Anesthesia type: total IV anesthesia and MAC    Monitoring Plan: Continuous noninvasive hemodynamic monitoring      Induction: Intravenous  Anesthetic plan and risks discussed with: Patient and Family

## 2023-01-06 NOTE — PROGRESS NOTES
Problem: Pressure Injury - Risk of  Goal: *Prevention of pressure injury  Description: Document Ramon Scale and appropriate interventions in the flowsheet.   Outcome: Progressing Towards Goal  Note: Pressure Injury Interventions:       Moisture Interventions: Absorbent underpads, Check for incontinence Q2 hours and as needed, Offer toileting Q_hr    Activity Interventions: PT/OT evaluation    Mobility Interventions: PT/OT evaluation    Nutrition Interventions: Document food/fluid/supplement intake

## 2023-01-07 LAB
ABO + RH BLD: NORMAL
ALBUMIN SERPL-MCNC: 2.8 G/DL (ref 3.5–5)
ALBUMIN/GLOB SERPL: 0.9 (ref 1.1–2.2)
ALP SERPL-CCNC: 66 U/L (ref 45–117)
ALT SERPL-CCNC: 18 U/L (ref 12–78)
ANION GAP SERPL CALC-SCNC: 5 MMOL/L (ref 5–15)
AST SERPL W P-5'-P-CCNC: 19 U/L (ref 15–37)
BILIRUB SERPL-MCNC: 0.6 MG/DL (ref 0.2–1)
BLD PROD TYP BPU: NORMAL
BLD PROD TYP BPU: NORMAL
BLOOD GROUP ANTIBODIES SERPL: NEGATIVE
BPU ID: NORMAL
BPU ID: NORMAL
BUN SERPL-MCNC: 16 MG/DL (ref 6–20)
BUN/CREAT SERPL: 13 (ref 12–20)
CA-I BLD-MCNC: 8.7 MG/DL (ref 8.5–10.1)
CHLORIDE SERPL-SCNC: 111 MMOL/L (ref 97–108)
CO2 SERPL-SCNC: 28 MMOL/L (ref 21–32)
CREAT SERPL-MCNC: 1.24 MG/DL (ref 0.55–1.02)
CROSSMATCH RESULT,%XM: NORMAL
CROSSMATCH RESULT,%XM: NORMAL
ERYTHROCYTE [DISTWIDTH] IN BLOOD BY AUTOMATED COUNT: 16.4 % (ref 11.5–14.5)
GLOBULIN SER CALC-MCNC: 3.2 G/DL (ref 2–4)
GLUCOSE BLD STRIP.AUTO-MCNC: 121 MG/DL (ref 65–100)
GLUCOSE BLD STRIP.AUTO-MCNC: 128 MG/DL (ref 65–100)
GLUCOSE BLD STRIP.AUTO-MCNC: 144 MG/DL (ref 65–100)
GLUCOSE BLD STRIP.AUTO-MCNC: 156 MG/DL (ref 65–100)
GLUCOSE SERPL-MCNC: 131 MG/DL (ref 65–100)
HCT VFR BLD AUTO: 35.6 % (ref 35–47)
HGB BLD-MCNC: 11.4 G/DL (ref 11.5–16)
MCH RBC QN AUTO: 27.3 PG (ref 26–34)
MCHC RBC AUTO-ENTMCNC: 32 G/DL (ref 30–36.5)
MCV RBC AUTO: 85.4 FL (ref 80–99)
NRBC # BLD: 0.02 K/UL (ref 0–0.01)
NRBC BLD-RTO: 0.2 PER 100 WBC
PERFORMED BY, TECHID: ABNORMAL
PLATELET # BLD AUTO: 189 K/UL (ref 150–400)
PMV BLD AUTO: 9.4 FL (ref 8.9–12.9)
POTASSIUM SERPL-SCNC: 4 MMOL/L (ref 3.5–5.1)
PROT SERPL-MCNC: 6 G/DL (ref 6.4–8.2)
RBC # BLD AUTO: 4.17 M/UL (ref 3.8–5.2)
SODIUM SERPL-SCNC: 144 MMOL/L (ref 136–145)
SPECIMEN EXP DATE BLD: NORMAL
STATUS OF UNIT,%ST: NORMAL
STATUS OF UNIT,%ST: NORMAL
TRANSFUSION STATUS PATIENT QL: NORMAL
TRANSFUSION STATUS PATIENT QL: NORMAL
UNIT DIVISION, %UDIV: 0
UNIT DIVISION, %UDIV: 0
WBC # BLD AUTO: 8.1 K/UL (ref 3.6–11)

## 2023-01-07 PROCEDURE — 36415 COLL VENOUS BLD VENIPUNCTURE: CPT

## 2023-01-07 PROCEDURE — 74011250636 HC RX REV CODE- 250/636: Performed by: FAMILY MEDICINE

## 2023-01-07 PROCEDURE — 65270000029 HC RM PRIVATE

## 2023-01-07 PROCEDURE — C9113 INJ PANTOPRAZOLE SODIUM, VIA: HCPCS | Performed by: FAMILY MEDICINE

## 2023-01-07 PROCEDURE — 85027 COMPLETE CBC AUTOMATED: CPT

## 2023-01-07 PROCEDURE — 80053 COMPREHEN METABOLIC PANEL: CPT

## 2023-01-07 PROCEDURE — 74011250637 HC RX REV CODE- 250/637: Performed by: FAMILY MEDICINE

## 2023-01-07 PROCEDURE — 82962 GLUCOSE BLOOD TEST: CPT

## 2023-01-07 PROCEDURE — 74011000250 HC RX REV CODE- 250: Performed by: FAMILY MEDICINE

## 2023-01-07 PROCEDURE — 74011636637 HC RX REV CODE- 636/637: Performed by: FAMILY MEDICINE

## 2023-01-07 RX ORDER — PANTOPRAZOLE SODIUM 40 MG/1
40 TABLET, DELAYED RELEASE ORAL EVERY 12 HOURS
Status: DISCONTINUED | OUTPATIENT
Start: 2023-01-07 | End: 2023-01-09 | Stop reason: HOSPADM

## 2023-01-07 RX ORDER — PANTOPRAZOLE SODIUM 40 MG/1
40 TABLET, DELAYED RELEASE ORAL
Status: DISCONTINUED | OUTPATIENT
Start: 2023-01-08 | End: 2023-01-07

## 2023-01-07 RX ADMIN — SODIUM BICARBONATE 325 MG: 650 TABLET ORAL at 11:36

## 2023-01-07 RX ADMIN — INSULIN LISPRO 3 UNITS: 100 INJECTION, SOLUTION INTRAVENOUS; SUBCUTANEOUS at 08:27

## 2023-01-07 RX ADMIN — AMIODARONE HYDROCHLORIDE 100 MG: 200 TABLET ORAL at 11:36

## 2023-01-07 RX ADMIN — SODIUM CHLORIDE, PRESERVATIVE FREE 40 MG: 5 INJECTION INTRAVENOUS at 11:37

## 2023-01-07 RX ADMIN — HYDRALAZINE HYDROCHLORIDE 25 MG: 25 TABLET, FILM COATED ORAL at 21:21

## 2023-01-07 RX ADMIN — SODIUM BICARBONATE 325 MG: 650 TABLET ORAL at 21:21

## 2023-01-07 RX ADMIN — ACETAMINOPHEN 650 MG: 325 TABLET ORAL at 19:17

## 2023-01-07 RX ADMIN — AMLODIPINE BESYLATE 5 MG: 5 TABLET ORAL at 11:36

## 2023-01-07 RX ADMIN — PANTOPRAZOLE SODIUM 40 MG: 40 TABLET, DELAYED RELEASE ORAL at 21:20

## 2023-01-07 RX ADMIN — CARVEDILOL 18.75 MG: 12.5 TABLET, FILM COATED ORAL at 11:36

## 2023-01-07 RX ADMIN — ACETAMINOPHEN 650 MG: 325 TABLET ORAL at 00:11

## 2023-01-07 RX ADMIN — CARVEDILOL 18.75 MG: 12.5 TABLET, FILM COATED ORAL at 21:20

## 2023-01-07 RX ADMIN — SODIUM CHLORIDE 75 ML/HR: 4.5 INJECTION, SOLUTION INTRAVENOUS at 00:15

## 2023-01-07 RX ADMIN — FERROUS SULFATE TAB 325 MG (65 MG ELEMENTAL FE) 325 MG: 325 (65 FE) TAB at 21:21

## 2023-01-07 RX ADMIN — HYDRALAZINE HYDROCHLORIDE 25 MG: 25 TABLET, FILM COATED ORAL at 11:36

## 2023-01-07 RX ADMIN — FERROUS SULFATE TAB 325 MG (65 MG ELEMENTAL FE) 325 MG: 325 (65 FE) TAB at 11:36

## 2023-01-07 NOTE — PROGRESS NOTES
Problem: Pressure Injury - Risk of  Goal: *Prevention of pressure injury  Description: Document Ramon Scale and appropriate interventions in the flowsheet. Outcome: Progressing Towards Goal  Note: Pressure Injury Interventions:       Moisture Interventions: Absorbent underpads, Check for incontinence Q2 hours and as needed    Activity Interventions: PT/OT evaluation    Mobility Interventions: PT/OT evaluation    Nutrition Interventions: Document food/fluid/supplement intake, Offer support with meals,snacks and hydration                     Problem: Falls - Risk of  Goal: *Absence of Falls  Description: Document Miguel A Fall Risk and appropriate interventions in the flowsheet. Outcome: Progressing Towards Goal  Note: Fall Risk Interventions:       Standard Safety Measures include: Bed/Chair alarm on; Bed/Chair Wheels locked;  Bed in low position; Call light within reach

## 2023-01-07 NOTE — PROGRESS NOTES
General Daily Progress Note          Patient Name:   Aimee Toribio       YOB: 1935       Age:  80 y.o. Admit Date: 1/3/2023      Subjective:     Patient is a 80y.o. year old female with PMH of HTN, CHF, chronic A. fib CKI stage 3b, and type II DM presents to ED with complaint of bloody stool that began Sunday. She is on Eliquis for A. fib patient experiences bloody stool whenever she attempts to eat or drink water. She states she has not been able to eat since Sunday. The blood is described as a mix of dark and bright red at different times. Patient had a colonoscopy done >5 years ago which did not reveal any abnormalities. Per daughter, Janeth Cunningham, last meal patient had was shrimp fried rice from unknown restaurant. Patient denies fever and abdominal pain. Only stated pain is in her right ankle, which she sprained. Hgb 5.7 in ED       1/4    After blood transfusion hemoglobin stable    1/5    Patient was seen and examined in room today. She was resting comfortably in bed. Patient reports feeling better. She has been tolerating clear liquid diet well. Denies any nausea, vomiting, constipation, or diarrhea. No blood in stool. Hgb 7.6 today. Na 148, Cl 115, Ca 8.2.    1/6  Patient was seen and examined in room today. Patient reports feeling well. However during bowel prep last night for colonoscopy she had dark colored blood in stool, Hgb 6.9 and was transfused 1 unit of blood. Per nurse she is still having blood in stool.      bleeding scan done shows active bleeding in the region of cecum    1/7    Colonoscopy done shows diverticulosis of large intestine 9 hemorrhoidsResting       Objective:     Visit Vitals  BP (!) 158/78 (BP 1 Location: Left upper arm)   Pulse 82   Temp 98.1 °F (36.7 °C)   Resp 20   Ht 4' 11\" (1.499 m)   Wt 62.4 kg (137 lb 9.6 oz)   SpO2 96%   BMI 27.79 kg/m²        Recent Results (from the past 24 hour(s))   GLUCOSE, POC    Collection Time: 01/06/23  3:51 PM   Result Value Ref Range    Glucose (POC) 164 (H) 65 - 100 mg/dL    Performed by Tomeka Pittman    CBC W/O DIFF    Collection Time: 01/06/23  4:54 PM   Result Value Ref Range    WBC 7.7 3.6 - 11.0 K/uL    RBC 4.21 3.80 - 5.20 M/uL    HGB 11.5 11.5 - 16.0 g/dL    HCT 35.8 35.0 - 47.0 %    MCV 85.0 80.0 - 99.0 FL    MCH 27.3 26.0 - 34.0 PG    MCHC 32.1 30.0 - 36.5 g/dL    RDW 16.0 (H) 11.5 - 14.5 %    PLATELET 528 199 - 727 K/uL    MPV 9.7 8.9 - 12.9 FL    NRBC 0.4 (H) 0.0  WBC    ABSOLUTE NRBC 0.03 (H) 0.00 - 4.91 K/uL   METABOLIC PANEL, COMPREHENSIVE    Collection Time: 01/06/23  4:54 PM   Result Value Ref Range    Sodium 145 136 - 145 mmol/L    Potassium 3.7 3.5 - 5.1 mmol/L    Chloride 116 (H) 97 - 108 mmol/L    CO2 23 21 - 32 mmol/L    Anion gap 6 5 - 15 mmol/L    Glucose 185 (H) 65 - 100 mg/dL    BUN 23 (H) 6 - 20 mg/dL    Creatinine 1.04 (H) 0.55 - 1.02 mg/dL    BUN/Creatinine ratio 22 (H) 12 - 20      eGFR 52 (L) >60 ml/min/1.73m2    Calcium 8.6 8.5 - 10.1 mg/dL    Bilirubin, total 0.5 0.2 - 1.0 mg/dL    AST (SGOT) 24 15 - 37 U/L    ALT (SGPT) 16 12 - 78 U/L    Alk. phosphatase 62 45 - 117 U/L    Protein, total 5.7 (L) 6.4 - 8.2 g/dL    Albumin 2.6 (L) 3.5 - 5.0 g/dL    Globulin 3.1 2.0 - 4.0 g/dL    A-G Ratio 0.8 (L) 1.1 - 2.2     GLUCOSE, POC    Collection Time: 01/06/23  8:32 PM   Result Value Ref Range    Glucose (POC) 166 (H) 65 - 100 mg/dL    Performed by [address] [place] [address], POC    Collection Time: 01/07/23  8:10 AM   Result Value Ref Range    Glucose (POC) 144 (H) 65 - 100 mg/dL    Performed by Clifford Alexandre    GLUCOSE, POC    Collection Time: 01/07/23 12:09 PM   Result Value Ref Range    Glucose (POC) 121 (H) 65 - 100 mg/dL    Performed by Clifford Alexandre      [unfilled]      Review of Systems    Constitutional: Negative for chills and fever. HENT: Negative. Eyes: Negative. Respiratory: Negative. Cardiovascular: Negative. Gastrointestinal: Negative for abdominal pain and nausea. Skin: Negative. Neurological: Negative. Physical Exam:      Constitutional: pt is oriented to person, place, and time. HENT:   Head: Normocephalic and atraumatic. Eyes: Pupils are equal, round, and reactive to light. EOM are normal.   Cardiovascular: Normal rate, regular rhythm and normal heart sounds. Pulmonary/Chest: Breath sounds normal. No wheezes. No rales. Exhibits no tenderness. Abdominal: Soft. Bowel sounds are normal. There is no abdominal tenderness. There is no rebound and no guarding. Musculoskeletal: Normal range of motion. Neurological: pt is alert and oriented to person, place, and time. NM ACUTE GI BLEED SCAN   Final Result   Active GI bleed in the region of the cecum. The findings were called to the floor nurse on 1/5/2023 at 10:30 PM by Dr. Giovani Castro. [id number]            CT ABD PELV WO CONT   Final Result   1. Several chronic findings including a nonobstructive left lower pole renal   stone, moderate hiatal hernia, bilateral renal cysts, sigmoid diverticulosis,   and lumbar spine degenerative changes. 2.  No acute pathology in the abdomen or pelvis.            Recent Results (from the past 24 hour(s))   GLUCOSE, POC    Collection Time: 01/06/23  3:51 PM   Result Value Ref Range    Glucose (POC) 164 (H) 65 - 100 mg/dL    Performed by Vianca Chawla    CBC W/O DIFF    Collection Time: 01/06/23  4:54 PM   Result Value Ref Range    WBC 7.7 3.6 - 11.0 K/uL    RBC 4.21 3.80 - 5.20 M/uL    HGB 11.5 11.5 - 16.0 g/dL    HCT 35.8 35.0 - 47.0 %    MCV 85.0 80.0 - 99.0 FL    MCH 27.3 26.0 - 34.0 PG    MCHC 32.1 30.0 - 36.5 g/dL    RDW 16.0 (H) 11.5 - 14.5 %    PLATELET 578 744 - 544 K/uL    MPV 9.7 8.9 - 12.9 FL    NRBC 0.4 (H) 0.0  WBC    ABSOLUTE NRBC 0.03 (H) 0.00 - 3.61 K/uL   METABOLIC PANEL, COMPREHENSIVE    Collection Time: 01/06/23  4:54 PM   Result Value Ref Range    Sodium 145 136 - 145 mmol/L    Potassium 3.7 3.5 - 5.1 mmol/L    Chloride 116 (H) 97 - 108 mmol/L    CO2 23 21 - 32 mmol/L    Anion gap 6 5 - 15 mmol/L    Glucose 185 (H) 65 - 100 mg/dL    BUN 23 (H) 6 - 20 mg/dL    Creatinine 1.04 (H) 0.55 - 1.02 mg/dL    BUN/Creatinine ratio 22 (H) 12 - 20      eGFR 52 (L) >60 ml/min/1.73m2    Calcium 8.6 8.5 - 10.1 mg/dL    Bilirubin, total 0.5 0.2 - 1.0 mg/dL    AST (SGOT) 24 15 - 37 U/L    ALT (SGPT) 16 12 - 78 U/L    Alk. phosphatase 62 45 - 117 U/L    Protein, total 5.7 (L) 6.4 - 8.2 g/dL    Albumin 2.6 (L) 3.5 - 5.0 g/dL    Globulin 3.1 2.0 - 4.0 g/dL    A-G Ratio 0.8 (L) 1.1 - 2.2     GLUCOSE, POC    Collection Time: 01/06/23  8:32 PM   Result Value Ref Range    Glucose (POC) 166 (H) 65 - 100 mg/dL    Performed by Elizabeth Gillespie    GLUCOSE, POC    Collection Time: 01/07/23  8:10 AM   Result Value Ref Range    Glucose (POC) 144 (H) 65 - 100 mg/dL    Performed by Terri Romero    GLUCOSE, POC    Collection Time: 01/07/23 12:09 PM   Result Value Ref Range    Glucose (POC) 121 (H) 65 - 100 mg/dL    Performed by Terri Romero        Results       ** No results found for the last 336 hours. **             Labs:     Recent Labs     01/06/23  1654 01/06/23  0029 01/05/23  0646   WBC 7.7  --  8.2   HGB 11.5 6.9* 7.6*   HCT 35.8 22.2* 24.4*     --  158       Recent Labs     01/06/23  1654 01/05/23  0646    148*   K 3.7 3.7   * 115*   CO2 23 24   BUN 23* 49*   CREA 1.04* 1.32*   * 126*   CA 8.6 8.2*       Recent Labs     01/06/23  1654 01/05/23  0646   ALT 16 14   AP 62 63   TBILI 0.5 0.6   TP 5.7* 5.3*   ALB 2.6* 2.7*   GLOB 3.1 2.6       No results for input(s): INR, PTP, APTT, INREXT, INREXT in the last 72 hours. No results for input(s): FE, TIBC, PSAT, FERR in the last 72 hours. No results found for: FOL, RBCF   No results for input(s): PH, PCO2, PO2 in the last 72 hours. No results for input(s): CPK, CKNDX, TROIQ in the last 72 hours.     No lab exists for component: CPKMB  Lab Results   Component Value Date/Time    Cholesterol, total 181 11/05/2020 12:48 PM    HDL Cholesterol 66 11/05/2020 12:48 PM    LDL, calculated 96 11/05/2020 12:48 PM    LDL, calculated 91 03/11/2019 03:16 PM    Triglyceride 105 11/05/2020 12:48 PM     Lab Results   Component Value Date/Time    Glucose (POC) 121 (H) 01/07/2023 12:09 PM    Glucose (POC) 144 (H) 01/07/2023 08:10 AM    Glucose (POC) 166 (H) 01/06/2023 08:32 PM    Glucose (POC) 164 (H) 01/06/2023 03:51 PM    Glucose (POC) 160 (H) 01/06/2023 10:55 AM     Lab Results   Component Value Date/Time    Color Yellow/Straw 01/03/2023 02:52 PM    Appearance Clear 01/03/2023 02:52 PM    Specific gravity 1.018 01/03/2023 02:52 PM    Specific gravity 1.019 06/11/2021 02:45 PM    pH (UA) 5.0 01/03/2023 02:52 PM    Protein Negative 01/03/2023 02:52 PM    Glucose Negative 01/03/2023 02:52 PM    Ketone Negative 01/03/2023 02:52 PM    Bilirubin Negative 01/03/2023 02:52 PM    Urobilinogen 0.1 01/03/2023 02:52 PM    Nitrites Negative 01/03/2023 02:52 PM    Leukocyte Esterase Small (A) 01/03/2023 02:52 PM    Epithelial cells Few 01/03/2023 02:52 PM    Bacteria Negative 01/03/2023 02:52 PM    WBC 5-10 01/03/2023 02:52 PM    RBC 0-5 01/03/2023 02:52 PM         Assessment:     Acute GI bleed likely diverticular bleed  2. Acute blood loss anemia, improving  3. CKD stage 3b  4. Diabetes  5. CHF  6. Hypertension  9.   Bleeding scan shows active bleeding at the region of cecum    Plan:   Eliquis held by GI  Continue protonix 40 mg IV  Monitor H&H and renal function  Repeat labs in the morning    If hemoglobin remains stable possible discharge home tomorrow          Current Facility-Administered Medications:     0.9% sodium chloride infusion 250 mL, 250 mL, IntraVENous, PRN, Micky Nichole MD    0.45% sodium chloride infusion, 75 mL/hr, IntraVENous, CONTINUOUS, Maddie Reynoso MD, Last Rate: 75 mL/hr at 01/07/23 0015, 75 mL/hr at 01/07/23 0015    amiodarone (CORDARONE) tablet 100 mg, 100 mg, Oral, DAILY, Tio Reynoso MD, 100 mg at 01/07/23 1136    0.9% sodium chloride infusion 250 mL, 250 mL, IntraVENous, PRN, Mohiuddin, Corrinne Cords, MD    amLODIPine (NORVASC) tablet 5 mg, 5 mg, Oral, DAILY, Maddie Reynoso MD, 5 mg at 01/07/23 1136    carvediloL (COREG) tablet 18.75 mg, 18.75 mg, Oral, BID, Maddie Reynoso MD, 18.75 mg at 01/07/23 1136    ferrous sulfate tablet 325 mg, 325 mg, Oral, BID, Maddie Reynoso MD, 325 mg at 01/07/23 1136    hydrALAZINE (APRESOLINE) tablet 25 mg, 25 mg, Oral, BID, Yvan Miller MD, 25 mg at 01/07/23 1136    sodium bicarbonate tablet 325 mg, 325 mg, Oral, BID, Maddie Reynoso MD, 325 mg at 01/07/23 1136    insulin lispro (HUMALOG) injection, , SubCUTAneous, AC&HS, Yvan Miller MD, 3 Units at 01/07/23 0827    glucose chewable tablet 16 g, 4 Tablet, Oral, PRN, Mohiuddin, Corrinne Cords, MD    glucagon (GLUCAGEN) injection 1 mg, 1 mg, IntraMUSCular, PRN, Mohiuddin, Corrinne Cords, MD    acetaminophen (TYLENOL) tablet 650 mg, 650 mg, Oral, Q6H PRN, 650 mg at 01/07/23 0011 **OR** acetaminophen (TYLENOL) suppository 650 mg, 650 mg, Rectal, Q6H PRN, Maddie Reynoso MD    polyethylene glycol (MIRALAX) packet 17 g, 17 g, Oral, DAILY PRN, Maddie Reynoso MD    ondansetron (ZOFRAN ODT) tablet 4 mg, 4 mg, Oral, Q8H PRN **OR** ondansetron (ZOFRAN) injection 4 mg, 4 mg, IntraVENous, Q6H PRN, Maddie Reynoso MD    pantoprazole (PROTONIX) 40 mg in 0.9% sodium chloride 10 mL injection, 40 mg, IntraVENous, Q12H, Maddie Reynoso MD, 40 mg at 01/07/23 1139

## 2023-01-07 NOTE — PROGRESS NOTES
Problem: Pressure Injury - Risk of  Goal: *Prevention of pressure injury  Description: Document Ramon Scale and appropriate interventions in the flowsheet.   Note: Pressure Injury Interventions:       Moisture Interventions: Absorbent underpads    Activity Interventions: PT/OT evaluation    Mobility Interventions: PT/OT evaluation    Nutrition Interventions: Document food/fluid/supplement intake

## 2023-01-07 NOTE — PROGRESS NOTES
General Daily Progress Note          Patient Name:   Marga Ray       YOB: 1935       Age:  80 y.o. Admit Date: 1/3/2023      Subjective:     Patient is a 80y.o. year old female with PMH of HTN, CHF, chronic A. fib CKI stage 3b, and type II DM presents to ED with complaint of bloody stool that began Sunday. She is on Eliquis for A. fib patient experiences bloody stool whenever she attempts to eat or drink water. She states she has not been able to eat since Sunday. The blood is described as a mix of dark and bright red at different times. Patient had a colonoscopy done >5 years ago which did not reveal any abnormalities. Per daughter, Christi Forbes, last meal patient had was shrimp fried rice from unknown restaurant. Patient denies fever and abdominal pain. Only stated pain is in her right ankle, which she sprained. Hgb 5.7 in ED       1/4    After blood transfusion hemoglobin stable    1/5    Patient was seen and examined in room today. She was resting comfortably in bed. Patient reports feeling better. She has been tolerating clear liquid diet well. Denies any nausea, vomiting, constipation, or diarrhea. No blood in stool. Hgb 7.6 today. Na 148, Cl 115, Ca 8.2.    1/6  Patient was seen and examined in room today. Patient reports feeling well. However during bowel prep last night for colonoscopy she had dark colored blood in stool, Hgb 6.9 and was transfused 1 unit of blood. Per nurse she is still having blood in stool.      bleeding scan done shows active bleeding in the region of cecum        Objective:     Visit Vitals  BP (!) 158/78 (BP 1 Location: Left upper arm)   Pulse 82   Temp 98.1 °F (36.7 °C)   Resp 20   Ht 4' 11\" (1.499 m)   Wt 62.4 kg (137 lb 9.6 oz)   SpO2 96%   BMI 27.79 kg/m²        Recent Results (from the past 24 hour(s))   GLUCOSE, POC    Collection Time: 01/06/23  3:51 PM   Result Value Ref Range    Glucose (POC) 164 (H) 65 - 100 mg/dL    Performed by [facility] CBC W/O DIFF    Collection Time: 01/06/23  4:54 PM   Result Value Ref Range    WBC 7.7 3.6 - 11.0 K/uL    RBC 4.21 3.80 - 5.20 M/uL    HGB 11.5 11.5 - 16.0 g/dL    HCT 35.8 35.0 - 47.0 %    MCV 85.0 80.0 - 99.0 FL    MCH 27.3 26.0 - 34.0 PG    MCHC 32.1 30.0 - 36.5 g/dL    RDW 16.0 (H) 11.5 - 14.5 %    PLATELET 564 840 - 044 K/uL    MPV 9.7 8.9 - 12.9 FL    NRBC 0.4 (H) 0.0  WBC    ABSOLUTE NRBC 0.03 (H) 0.00 - 4.86 K/uL   METABOLIC PANEL, COMPREHENSIVE    Collection Time: 01/06/23  4:54 PM   Result Value Ref Range    Sodium 145 136 - 145 mmol/L    Potassium 3.7 3.5 - 5.1 mmol/L    Chloride 116 (H) 97 - 108 mmol/L    CO2 23 21 - 32 mmol/L    Anion gap 6 5 - 15 mmol/L    Glucose 185 (H) 65 - 100 mg/dL    BUN 23 (H) 6 - 20 mg/dL    Creatinine 1.04 (H) 0.55 - 1.02 mg/dL    BUN/Creatinine ratio 22 (H) 12 - 20      eGFR 52 (L) >60 ml/min/1.73m2    Calcium 8.6 8.5 - 10.1 mg/dL    Bilirubin, total 0.5 0.2 - 1.0 mg/dL    AST (SGOT) 24 15 - 37 U/L    ALT (SGPT) 16 12 - 78 U/L    Alk. phosphatase 62 45 - 117 U/L    Protein, total 5.7 (L) 6.4 - 8.2 g/dL    Albumin 2.6 (L) 3.5 - 5.0 g/dL    Globulin 3.1 2.0 - 4.0 g/dL    A-G Ratio 0.8 (L) 1.1 - 2.2     GLUCOSE, POC    Collection Time: 01/06/23  8:32 PM   Result Value Ref Range    Glucose (POC) 166 (H) 65 - 100 mg/dL    Performed by [address] [place] [address], POC    Collection Time: 01/07/23  8:10 AM   Result Value Ref Range    Glucose (POC) 144 (H) 65 - 100 mg/dL    Performed by Faina Thakkar    GLUCOSE, POC    Collection Time: 01/07/23 12:09 PM   Result Value Ref Range    Glucose (POC) 121 (H) 65 - 100 mg/dL    Performed by Faina Thakkar      [unfilled]      Review of Systems    Constitutional: Negative for chills and fever. HENT: Negative. Eyes: Negative. Respiratory: Negative. Cardiovascular: Negative. Gastrointestinal: Negative for abdominal pain and nausea. Skin: Negative. Neurological: Negative.         Physical Exam:      Constitutional: pt is oriented to person, place, and time. HENT:   Head: Normocephalic and atraumatic. Eyes: Pupils are equal, round, and reactive to light. EOM are normal.   Cardiovascular: Normal rate, regular rhythm and normal heart sounds. Pulmonary/Chest: Breath sounds normal. No wheezes. No rales. Exhibits no tenderness. Abdominal: Soft. Bowel sounds are normal. There is no abdominal tenderness. There is no rebound and no guarding. Musculoskeletal: Normal range of motion. Neurological: pt is alert and oriented to person, place, and time. NM ACUTE GI BLEED SCAN   Final Result   Active GI bleed in the region of the cecum. The findings were called to the floor nurse on 1/5/2023 at 10:30 PM by Dr. Dee Milan. [phone]            CT ABD PELV WO CONT   Final Result   1. Several chronic findings including a nonobstructive left lower pole renal   stone, moderate hiatal hernia, bilateral renal cysts, sigmoid diverticulosis,   and lumbar spine degenerative changes. 2.  No acute pathology in the abdomen or pelvis.            Recent Results (from the past 24 hour(s))   GLUCOSE, POC    Collection Time: 01/06/23  3:51 PM   Result Value Ref Range    Glucose (POC) 164 (H) 65 - 100 mg/dL    Performed by Leann Mckeon    CBC W/O DIFF    Collection Time: 01/06/23  4:54 PM   Result Value Ref Range    WBC 7.7 3.6 - 11.0 K/uL    RBC 4.21 3.80 - 5.20 M/uL    HGB 11.5 11.5 - 16.0 g/dL    HCT 35.8 35.0 - 47.0 %    MCV 85.0 80.0 - 99.0 FL    MCH 27.3 26.0 - 34.0 PG    MCHC 32.1 30.0 - 36.5 g/dL    RDW 16.0 (H) 11.5 - 14.5 %    PLATELET 445 858 - 987 K/uL    MPV 9.7 8.9 - 12.9 FL    NRBC 0.4 (H) 0.0  WBC    ABSOLUTE NRBC 0.03 (H) 0.00 - 7.74 K/uL   METABOLIC PANEL, COMPREHENSIVE    Collection Time: 01/06/23  4:54 PM   Result Value Ref Range    Sodium 145 136 - 145 mmol/L    Potassium 3.7 3.5 - 5.1 mmol/L    Chloride 116 (H) 97 - 108 mmol/L    CO2 23 21 - 32 mmol/L    Anion gap 6 5 - 15 mmol/L    Glucose 185 (H) 65 - 100 mg/dL BUN 23 (H) 6 - 20 mg/dL    Creatinine 1.04 (H) 0.55 - 1.02 mg/dL    BUN/Creatinine ratio 22 (H) 12 - 20      eGFR 52 (L) >60 ml/min/1.73m2    Calcium 8.6 8.5 - 10.1 mg/dL    Bilirubin, total 0.5 0.2 - 1.0 mg/dL    AST (SGOT) 24 15 - 37 U/L    ALT (SGPT) 16 12 - 78 U/L    Alk. phosphatase 62 45 - 117 U/L    Protein, total 5.7 (L) 6.4 - 8.2 g/dL    Albumin 2.6 (L) 3.5 - 5.0 g/dL    Globulin 3.1 2.0 - 4.0 g/dL    A-G Ratio 0.8 (L) 1.1 - 2.2     GLUCOSE, POC    Collection Time: 01/06/23  8:32 PM   Result Value Ref Range    Glucose (POC) 166 (H) 65 - 100 mg/dL    Performed by Hussein Miranda    GLUCOSE, POC    Collection Time: 01/07/23  8:10 AM   Result Value Ref Range    Glucose (POC) 144 (H) 65 - 100 mg/dL    Performed by [facility]    GLUCOSE, POC    Collection Time: 01/07/23 12:09 PM   Result Value Ref Range    Glucose (POC) 121 (H) 65 - 100 mg/dL    Performed by [email or web address]        Results       ** No results found for the last 336 hours. **             Labs:     Recent Labs     01/06/23 1654 01/06/23  0029 01/05/23  0646   WBC 7.7  --  8.2   HGB 11.5 6.9* 7.6*   HCT 35.8 22.2* 24.4*     --  158       Recent Labs     01/06/23  1654 01/05/23  0646    148*   K 3.7 3.7   * 115*   CO2 23 24   BUN 23* 49*   CREA 1.04* 1.32*   * 126*   CA 8.6 8.2*       Recent Labs     01/06/23  1654 01/05/23  0646   ALT 16 14   AP 62 63   TBILI 0.5 0.6   TP 5.7* 5.3*   ALB 2.6* 2.7*   GLOB 3.1 2.6       No results for input(s): INR, PTP, APTT, INREXT, INREXT in the last 72 hours. No results for input(s): FE, TIBC, PSAT, FERR in the last 72 hours. No results found for: FOL, RBCF   No results for input(s): PH, PCO2, PO2 in the last 72 hours. No results for input(s): CPK, CKNDX, TROIQ in the last 72 hours.     No lab exists for component: CPKMB  Lab Results   Component Value Date/Time    Cholesterol, total 181 11/05/2020 12:48 PM    HDL Cholesterol 66 11/05/2020 12:48 PM    LDL, calculated 96 11/05/2020 12:48 PM    LDL, calculated 91 03/11/2019 03:16 PM    Triglyceride 105 11/05/2020 12:48 PM     Lab Results   Component Value Date/Time    Glucose (POC) 121 (H) 01/07/2023 12:09 PM    Glucose (POC) 144 (H) 01/07/2023 08:10 AM    Glucose (POC) 166 (H) 01/06/2023 08:32 PM    Glucose (POC) 164 (H) 01/06/2023 03:51 PM    Glucose (POC) 160 (H) 01/06/2023 10:55 AM     Lab Results   Component Value Date/Time    Color Yellow/Straw 01/03/2023 02:52 PM    Appearance Clear 01/03/2023 02:52 PM    Specific gravity 1.018 01/03/2023 02:52 PM    Specific gravity 1.019 06/11/2021 02:45 PM    pH (UA) 5.0 01/03/2023 02:52 PM    Protein Negative 01/03/2023 02:52 PM    Glucose Negative 01/03/2023 02:52 PM    Ketone Negative 01/03/2023 02:52 PM    Bilirubin Negative 01/03/2023 02:52 PM    Urobilinogen 0.1 01/03/2023 02:52 PM    Nitrites Negative 01/03/2023 02:52 PM    Leukocyte Esterase Small (A) 01/03/2023 02:52 PM    Epithelial cells Few 01/03/2023 02:52 PM    Bacteria Negative 01/03/2023 02:52 PM    WBC 5-10 01/03/2023 02:52 PM    RBC 0-5 01/03/2023 02:52 PM         Assessment:     Acute GI bleed of unspecified location  2. Acute blood loss anemia, improving  3. CKD stage 3b  4. Diabetes  5. CHF  6. Hypertension  9.   Bleeding scan shows active bleeding at the region of cecum    Plan:   Eliquis held by GI  Continue protonix 40 mg IV  NPO status for colonoscopy  Monitor H&H and renal function  Repeat labs in the morning  With a GI          Current Facility-Administered Medications:     0.9% sodium chloride infusion 250 mL, 250 mL, IntraVENous, PRN, Ruperto Estrada MD    0.45% sodium chloride infusion, 75 mL/hr, IntraVENous, CONTINUOUS, Maddie Reynoso MD, Last Rate: 75 mL/hr at 01/07/23 0015, 75 mL/hr at 01/07/23 0015    amiodarone (CORDARONE) tablet 100 mg, 100 mg, Oral, DAILY, Maddie Reynoso MD, 100 mg at 01/07/23 1136    0.9% sodium chloride infusion 250 mL, 250 mL, IntraVENous, PRN, Zen Reynoso MD    amLODIPine (NORVASC) tablet 5 mg, 5 mg, Oral, DAILY, Maddie Reynoso MD, 5 mg at 01/07/23 1136    carvediloL (COREG) tablet 18.75 mg, 18.75 mg, Oral, BID, Maddie Reynoso MD, 18.75 mg at 01/07/23 1136    ferrous sulfate tablet 325 mg, 325 mg, Oral, BID, Sneha Reynoso MD, 325 mg at 01/07/23 1136    hydrALAZINE (APRESOLINE) tablet 25 mg, 25 mg, Oral, BID, Madide Reynoso MD, 25 mg at 01/07/23 1136    sodium bicarbonate tablet 325 mg, 325 mg, Oral, BID, Maddie Reynoso MD, 325 mg at 01/07/23 1136    insulin lispro (HUMALOG) injection, , SubCUTAneous, AC&HS, Matt Marcelino MD, 3 Units at 01/07/23 0827    glucose chewable tablet 16 g, 4 Tablet, Oral, PRN, Sneha Reynoso MD    glucagon (GLUCAGEN) injection 1 mg, 1 mg, IntraMUSCular, PRN, Sneha Reynoso MD    acetaminophen (TYLENOL) tablet 650 mg, 650 mg, Oral, Q6H PRN, 650 mg at 01/07/23 0011 **OR** acetaminophen (TYLENOL) suppository 650 mg, 650 mg, Rectal, Q6H PRN, Maddie Reynoso MD    polyethylene glycol (MIRALAX) packet 17 g, 17 g, Oral, DAILY PRN, Maddie Reynoso MD    ondansetron (ZOFRAN ODT) tablet 4 mg, 4 mg, Oral, Q8H PRN **OR** ondansetron (ZOFRAN) injection 4 mg, 4 mg, IntraVENous, Q6H PRN, Maddie Reynoso MD    pantoprazole (PROTONIX) 40 mg in 0.9% sodium chloride 10 mL injection, 40 mg, IntraVENous, Q12H, Maddie Reynoso MD, 40 mg at 01/07/23 1137

## 2023-01-08 LAB
GLUCOSE BLD STRIP.AUTO-MCNC: 103 MG/DL (ref 65–100)
GLUCOSE BLD STRIP.AUTO-MCNC: 110 MG/DL (ref 65–100)
GLUCOSE BLD STRIP.AUTO-MCNC: 111 MG/DL (ref 65–100)
GLUCOSE BLD STRIP.AUTO-MCNC: 149 MG/DL (ref 65–100)
PERFORMED BY, TECHID: ABNORMAL

## 2023-01-08 PROCEDURE — 74011636637 HC RX REV CODE- 636/637: Performed by: FAMILY MEDICINE

## 2023-01-08 PROCEDURE — 74011250637 HC RX REV CODE- 250/637: Performed by: FAMILY MEDICINE

## 2023-01-08 PROCEDURE — 65270000029 HC RM PRIVATE

## 2023-01-08 PROCEDURE — 82962 GLUCOSE BLOOD TEST: CPT

## 2023-01-08 RX ORDER — CARVEDILOL 6.25 MG/1
18.75 TABLET ORAL 2 TIMES DAILY
Qty: 60 TABLET | Refills: 1 | Status: SHIPPED | OUTPATIENT
Start: 2023-01-08

## 2023-01-08 RX ORDER — PANTOPRAZOLE SODIUM 40 MG/1
40 TABLET, DELAYED RELEASE ORAL EVERY 12 HOURS
Qty: 60 TABLET | Refills: 0 | Status: SHIPPED | OUTPATIENT
Start: 2023-01-08

## 2023-01-08 RX ORDER — AMIODARONE HYDROCHLORIDE 100 MG/1
100 TABLET ORAL DAILY
Qty: 30 TABLET | Refills: 0 | Status: SHIPPED | OUTPATIENT
Start: 2023-01-09

## 2023-01-08 RX ADMIN — HYDRALAZINE HYDROCHLORIDE 25 MG: 25 TABLET, FILM COATED ORAL at 21:36

## 2023-01-08 RX ADMIN — CARVEDILOL 18.75 MG: 12.5 TABLET, FILM COATED ORAL at 09:17

## 2023-01-08 RX ADMIN — AMIODARONE HYDROCHLORIDE 100 MG: 200 TABLET ORAL at 09:18

## 2023-01-08 RX ADMIN — INSULIN LISPRO 3 UNITS: 100 INJECTION, SOLUTION INTRAVENOUS; SUBCUTANEOUS at 12:26

## 2023-01-08 RX ADMIN — FERROUS SULFATE TAB 325 MG (65 MG ELEMENTAL FE) 325 MG: 325 (65 FE) TAB at 09:19

## 2023-01-08 RX ADMIN — SODIUM BICARBONATE 325 MG: 650 TABLET ORAL at 09:17

## 2023-01-08 RX ADMIN — ACETAMINOPHEN 650 MG: 325 TABLET ORAL at 18:02

## 2023-01-08 RX ADMIN — FERROUS SULFATE TAB 325 MG (65 MG ELEMENTAL FE) 325 MG: 325 (65 FE) TAB at 21:36

## 2023-01-08 RX ADMIN — CARVEDILOL 18.75 MG: 12.5 TABLET, FILM COATED ORAL at 21:35

## 2023-01-08 RX ADMIN — PANTOPRAZOLE SODIUM 40 MG: 40 TABLET, DELAYED RELEASE ORAL at 09:17

## 2023-01-08 RX ADMIN — PANTOPRAZOLE SODIUM 40 MG: 40 TABLET, DELAYED RELEASE ORAL at 21:36

## 2023-01-08 RX ADMIN — AMLODIPINE BESYLATE 5 MG: 5 TABLET ORAL at 09:20

## 2023-01-08 RX ADMIN — HYDRALAZINE HYDROCHLORIDE 25 MG: 25 TABLET, FILM COATED ORAL at 09:18

## 2023-01-08 RX ADMIN — SODIUM BICARBONATE 325 MG: 650 TABLET ORAL at 21:36

## 2023-01-08 NOTE — DISCHARGE INSTRUCTIONS
Discharge Instructions       PATIENT ID: Olivia Little  MRN: [id number]   YOB: 1935    DATE OF ADMISSION: [unfilled]    DATE OF DISCHARGE: 1/8/2023    PRIMARY CARE PROVIDER: @PCP@     ATTENDING PHYSICIAN: [unfilled]  DISCHARGING PROVIDER: Miya Rodriguez MD    To contact this individual call [phone] and ask the  to page. If unavailable ask to be transferred the Adult Hospitalist Department. DISCHARGE DIAGNOSES diverticular bleed    CONSULTATIONS: [unfilled]    PROCEDURES/SURGERIES: Procedure(s):  COLONOSCOPY    PENDING TEST RESULTS:   At the time of discharge the following test results are still pending: None    [facility]MENTS:   [facility]     ADDITIONAL CARE RECOMMENDATIONS: Follow-up with the PCP regarding starting back on Eliquis    DIET: Resume previous diet      ACTIVITY: Activity as tolerated    Wound care: Wound Care Order: submitted to [facility] Please view [email or web address]    EQUIPMENT needed: ***      DISCHARGE MEDICATIONS:   See Medication Reconciliation Form    It is important that you take the medication exactly as they are prescribed. Keep your medication in the bottles provided by the pharmacist and keep a list of the medication names, dosages, and times to be taken in your wallet. Do not take other medications without consulting your doctor. NOTIFY YOUR PHYSICIAN FOR ANY OF THE FOLLOWING:   Fever over 101 degrees for 24 hours. Chest pain, shortness of breath, fever, chills, nausea, vomiting, diarrhea, change in mentation, falling, weakness, bleeding. Severe pain or pain not relieved by medications. Or, any other signs or symptoms that you may have questions about.       DISPOSITION:    Home With:   OT  PT  HH  RN       SNF/Inpatient Rehab/LTAC    Independent/assisted living    Hospice    Other:         PROBLEM LIST Updated:  Yes ***       Signed:   Miya Rodriguez MD  1/8/2023  1:01 PM

## 2023-01-08 NOTE — PROGRESS NOTES
DC order noted. Chart reviewed. New order for Home Health noted. Spoke with primary RN who was not able to clarify change from [facility]. Spoke with Attending who ordered [address] DC order modified. Spoke with the pts daughter Ammy BRIGHTGG@ [phone]. Reported that this pt lives alone and she feels as though she will be unsafe at home. She will transport the pt on Monday to her own home for family care.   Ms Frandy Vasques needs 1 day to prepare her home for the pts arrival.  She will transport the pt after lunch  \"around 2pm\" on Monday    Delay entered for safe DC on Monday 01/09/2023

## 2023-01-08 NOTE — ANESTHESIA POSTPROCEDURE EVALUATION
Procedure(s):  COLONOSCOPY.    total IV anesthesia, MAC    Anesthesia Post Evaluation      Multimodal analgesia: multimodal analgesia not used between 6 hours prior to anesthesia start to PACU discharge  Patient location during evaluation: bedside (Endoscopy suite)  Patient participation: complete - patient cannot participate  Level of consciousness: sleepy but conscious  Pain score: 0  Pain management: adequate  Airway patency: patent  Anesthetic complications: no  Cardiovascular status: acceptable  Respiratory status: acceptable and nasal cannula  Hydration status: acceptable  Comments: This patient remained on the stretcher. The patient was handed off to the endoscopy nursing team.  All questions regarding pre-, intra-, and postoperative care were answered.   Post anesthesia nausea and vomiting:  none

## 2023-01-08 NOTE — DISCHARGE SUMMARY
Discharge Summary       PATIENT ID: Veronique Newsome  MRN: [id number]   YOB: 1935    DATE OF ADMISSION: 1/3/2023  1:29 PM    DATE OF DISCHARGE:   PRIMARY CARE PROVIDER: Rick Graf MD     ATTENDING PHYSICIAN: Zen Reynoso  DISCHARGING PROVIDER: Zne Reynoso      CONSULTATIONS: IP CONSULT TO GASTROENTEROLOGY  IP CONSULT TO CARDIOLOGY    PROCEDURES/SURGERIES: Procedure(s):  COLONOSCOPY    ADMITTING DIAGNOSES:    Patient Active Problem List    Diagnosis Date Noted    GI bleed 01/03/2023    Acute GI bleeding 01/03/2023    CKD (chronic kidney disease) stage 4, GFR 15-29 ml/min ([facility] [address] 10/13/2020    CKD (chronic kidney disease) stage 3, GFR 30-59 ml/min ([facility] [address] 10/07/2020       DISCHARGE DIAGNOSES / PLAN:      Acute GI bleed likely diverticular bleed  2. Acute blood loss anemia, improving  3. CKD stage 3b  4. Diabetes  5. CHF  6. Hypertension  9. Bleeding scan shows active bleeding at the region of cecum        DISCHARGE MEDICATIONS:  Current Discharge Medication List        START taking these medications    Details   pantoprazole (PROTONIX) 40 mg tablet Take 1 Tablet by mouth every twelve (12) hours. Qty: 60 Tablet, Refills: 0  Start date: 1/8/2023           CONTINUE these medications which have CHANGED    Details   amiodarone (PACERONE) 100 mg tablet Take 1 Tablet by mouth daily. Qty: 30 Tablet, Refills: 0  Start date: 1/9/2023      carvediloL (COREG) 6.25 mg tablet Take 3 Tablets by mouth two (2) times a day. Qty: 60 Tablet, Refills: 1  Start date: 1/8/2023           CONTINUE these medications which have NOT CHANGED    Details   amLODIPine (NORVASC) 5 mg tablet TAKE ONE (1) TABLET BY MOUTH TWICE DAILY  Qty: 180 Tablet, Refills: 1      ferrous sulfate (IRON) 325 mg (65 mg iron) EC tablet TAKE 1 TABLET TWICE DAILY  Qty: 180 Tab, Refills: 0    Associated Diagnoses: Anemia, unspecified type      sodium bicarbonate 325 mg tablet Take 325 mg by mouth two (2) times a day.       hydrALAZINE (APRESOLINE) 25 mg tablet Take 1 Tab by mouth two (2) times a day. STOP taking these medications       chlorthalidone (HYGROTON) 25 mg tablet Comments:   Reason for Stopping:         glyBURIDE (DIABETA) 2.5 mg tablet Comments:   Reason for Stopping:         glucose blood VI test strips (blood glucose test) strip Comments:   Reason for Stopping:         Blood-Glucose Meter (Blood Glucose Monitoring) monitoring kit Comments:   Reason for Stopping:         lancets misc Comments:   Reason for Stopping:         aspirin delayed-release 81 mg tablet Comments:   Reason for Stopping:         Eliquis 5 mg tablet Comments:   Reason for Stopping:         magnesium oxide (MAG-OX) 400 mg tablet Comments:   Reason for Stopping:         cetirizine (ZYRTEC) 10 mg tablet Comments:   Reason for Stopping:         losartan (COZAAR) 100 mg tablet Comments:   Reason for Stopping:                 NOTIFY YOUR PHYSICIAN FOR ANY OF THE FOLLOWING:   Fever over 101 degrees for 24 hours. Chest pain, shortness of breath, fever, chills, nausea, vomiting, diarrhea, change in mentation, falling, weakness, bleeding. Severe pain or pain not relieved by medications. Or, any other signs or symptoms that you may have questions about. DISPOSITION:  x  Home With:   OT  PT  HH  RN       Long term SNF/Inpatient Rehab    Independent/assisted living    Hospice    Other:       PATIENT CONDITION AT DISCHARGE: Stable      PHYSICAL EXAMINATION AT DISCHARGE:  General:          Alert, cooperative, no distress, appears stated age. HEENT:           Atraumatic, anicteric sclerae, pink conjunctivae                          No oral ulcers, mucosa moist, throat clear, dentition fair  Neck:               Supple, symmetrical  Lungs:             Clear to auscultation bilaterally. No Wheezing or Rhonchi. No rales. Chest wall:      No tenderness  No Accessory muscle use.   Heart:              Regular  rhythm,  No  murmur   No edema  Abdomen:        Soft, non-tender. Not distended. Bowel sounds normal  Extremities:     No cyanosis. No clubbing,                            Skin turgor normal, Capillary refill normal  Skin:                Not pale. Not Jaundiced  No rashes   Psych:             Not anxious or agitated. Neurologic:      Alert, moves all extremities, answers questions appropriately and responds to commands     NM ACUTE GI BLEED SCAN   Final Result   Active GI bleed in the region of the cecum. The findings were called to the floor nurse on 1/5/2023 at 10:30 PM by Dr. Timmy Jackson. [phone]            CT ABD PELV WO CONT   Final Result   1. Several chronic findings including a nonobstructive left lower pole renal   stone, moderate hiatal hernia, bilateral renal cysts, sigmoid diverticulosis,   and lumbar spine degenerative changes. 2.  No acute pathology in the abdomen or pelvis. Recent Results (from the past 24 hour(s))   CBC W/O DIFF    Collection Time: 01/07/23  3:44 PM   Result Value Ref Range    WBC 8.1 3.6 - 11.0 K/uL    RBC 4.17 3.80 - 5.20 M/uL    HGB 11.4 (L) 11.5 - 16.0 g/dL    HCT 35.6 35.0 - 47.0 %    MCV 85.4 80.0 - 99.0 FL    MCH 27.3 26.0 - 34.0 PG    MCHC 32.0 30.0 - 36.5 g/dL    RDW 16.4 (H) 11.5 - 14.5 %    PLATELET 650 880 - 004 K/uL    MPV 9.4 8.9 - 12.9 FL    NRBC 0.2 (H) 0.0  WBC    ABSOLUTE NRBC 0.02 (H) 0.00 - 2.92 K/uL   METABOLIC PANEL, COMPREHENSIVE    Collection Time: 01/07/23  3:44 PM   Result Value Ref Range    Sodium 144 136 - 145 mmol/L    Potassium 4.0 3.5 - 5.1 mmol/L    Chloride 111 (H) 97 - 108 mmol/L    CO2 28 21 - 32 mmol/L    Anion gap 5 5 - 15 mmol/L    Glucose 131 (H) 65 - 100 mg/dL    BUN 16 6 - 20 mg/dL    Creatinine 1.24 (H) 0.55 - 1.02 mg/dL    BUN/Creatinine ratio 13 12 - 20      eGFR 42 (L) >60 ml/min/1.73m2    Calcium 8.7 8.5 - 10.1 mg/dL    Bilirubin, total 0.6 0.2 - 1.0 mg/dL    AST (SGOT) 19 15 - 37 U/L    ALT (SGPT) 18 12 - 78 U/L    Alk.  phosphatase 66 45 - 117 U/L    Protein, total 6.0 (L) 6.4 - 8.2 g/dL    Albumin 2.8 (L) 3.5 - 5.0 g/dL    Globulin 3.2 2.0 - 4.0 g/dL    A-G Ratio 0.9 (L) 1.1 - 2.2     GLUCOSE, POC    Collection Time: 01/07/23  4:12 PM   Result Value Ref Range    Glucose (POC) 128 (H) 65 - 100 mg/dL    Performed by Timothy Palma, POC    Collection Time: 01/07/23  9:02 PM   Result Value Ref Range    Glucose (POC) 156 (H) 65 - 100 mg/dL    Performed by Emelyn Wellington    GLUCOSE, POC    Collection Time: 01/08/23  8:12 AM   Result Value Ref Range    Glucose (POC) 110 (H) 65 - 100 mg/dL    Performed by Estela Lee    GLUCOSE, POC    Collection Time: 01/08/23 12:13 PM   Result Value Ref Range    Glucose (POC) 149 (H) 65 - 100 mg/dL    Performed by [facility] COURSE:  Patient is a 80y.o. year old female with PMH of HTN, CHF, chronic A. fib CKI stage 3b, and type II DM presents to ED with complaint of bloody stool that began Sunday. She is on Eliquis for A. fib patient experiences bloody stool whenever she attempts to eat or drink water. She states she has not been able to eat since Sunday. The blood is described as a mix of dark and bright red at different times. Patient had a colonoscopy done >5 years ago which did not reveal any abnormalities. Per daughter, [employer], last meal patient had was shrimp fried rice from unknown restaurant. Patient denies fever and abdominal pain. Only stated pain is in her right ankle, which she sprained. Hgb 5.7 in ED        1/4     After blood transfusion hemoglobin stable    1/5     Patient was seen and examined in room today. She was resting comfortably in bed. Patient reports feeling better. She has been tolerating clear liquid diet well. Denies any nausea, vomiting, constipation, or diarrhea. No blood in stool. Hgb 7.6 today. Na 148, Cl 115, Ca 8.2.     1/6  Patient was seen and examined in room today. Patient reports feeling well.  However during bowel prep last night for colonoscopy she had dark colored blood in stool, Hgb 6.9 and was transfused 1 unit of blood. Per nurse she is still having blood in stool.       bleeding scan done shows active bleeding in the region of cecum     1/7     Colonoscopy done shows diverticulosis of large intestine 9 hemorrhoidsResting     Hemoglobin remained stable discharge home follow-up with the PCP follow-up with PCP regarding continuation of Eliquis    Will hold Eliquis at this time    Medication reconciliation done time discharge patient 35 to 40% time spent counseling and coordination of care         Signed:   Traci Masterson MD  1/8/2023  1:02 PM

## 2023-01-08 NOTE — PROGRESS NOTES
Problem: Pressure Injury - Risk of  Goal: *Prevention of pressure injury  Description: Document Ramon Scale and appropriate interventions in the flowsheet. Outcome: Progressing Towards Goal  Note: Pressure Injury Interventions:       Moisture Interventions: Absorbent underpads, Offer toileting Q_hr    Activity Interventions: Increase time out of bed, PT/OT evaluation    Mobility Interventions: HOB 30 degrees or less, PT/OT evaluation    Nutrition Interventions: Document food/fluid/supplement intake, Offer support with meals,snacks and hydration                     Problem: Falls - Risk of  Goal: *Absence of Falls  Description: Document Miguel A Fall Risk and appropriate interventions in the flowsheet.   Outcome: Progressing Towards Goal  Note: Fall Risk Interventions:

## 2023-01-09 VITALS
DIASTOLIC BLOOD PRESSURE: 72 MMHG | RESPIRATION RATE: 18 BRPM | SYSTOLIC BLOOD PRESSURE: 124 MMHG | HEIGHT: 59 IN | HEART RATE: 86 BPM | WEIGHT: 141.09 LBS | BODY MASS INDEX: 28.44 KG/M2 | OXYGEN SATURATION: 96 % | TEMPERATURE: 97.5 F

## 2023-01-09 LAB
GLUCOSE BLD STRIP.AUTO-MCNC: 129 MG/DL (ref 65–100)
GLUCOSE BLD STRIP.AUTO-MCNC: 181 MG/DL (ref 65–100)
PERFORMED BY, TECHID: ABNORMAL
PERFORMED BY, TECHID: ABNORMAL

## 2023-01-09 PROCEDURE — 82962 GLUCOSE BLOOD TEST: CPT

## 2023-01-09 PROCEDURE — 74011250637 HC RX REV CODE- 250/637: Performed by: FAMILY MEDICINE

## 2023-01-09 RX ADMIN — SODIUM BICARBONATE 325 MG: 650 TABLET ORAL at 08:46

## 2023-01-09 RX ADMIN — FERROUS SULFATE TAB 325 MG (65 MG ELEMENTAL FE) 325 MG: 325 (65 FE) TAB at 08:45

## 2023-01-09 RX ADMIN — AMLODIPINE BESYLATE 5 MG: 5 TABLET ORAL at 08:45

## 2023-01-09 RX ADMIN — PANTOPRAZOLE SODIUM 40 MG: 40 TABLET, DELAYED RELEASE ORAL at 08:45

## 2023-01-09 RX ADMIN — AMIODARONE HYDROCHLORIDE 100 MG: 200 TABLET ORAL at 08:45

## 2023-01-09 RX ADMIN — ACETAMINOPHEN 650 MG: 325 TABLET ORAL at 08:45

## 2023-01-09 RX ADMIN — HYDRALAZINE HYDROCHLORIDE 25 MG: 25 TABLET, FILM COATED ORAL at 08:45

## 2023-01-09 RX ADMIN — CARVEDILOL 18.75 MG: 12.5 TABLET, FILM COATED ORAL at 08:46

## 2023-01-09 NOTE — DISCHARGE SUMMARY
Discharge Summary       PATIENT ID: Suzanna Barr  MRN: [id number]   YOB: 1935    DATE OF ADMISSION: 1/3/2023  1:29 PM    DATE OF DISCHARGE:   PRIMARY CARE PROVIDER: Alisia Hinkle MD     ATTENDING PHYSICIAN: Yesy Reynoso  DISCHARGING PROVIDER: Yesy Reynoso      CONSULTATIONS: IP CONSULT TO GASTROENTEROLOGY    PROCEDURES/SURGERIES: Procedure(s):  COLONOSCOPY    ADMITTING DIAGNOSES:    Patient Active Problem List    Diagnosis Date Noted    GI bleed 01/03/2023    Acute GI bleeding 01/03/2023    CKD (chronic kidney disease) stage 4, GFR 15-29 ml/min ([facility]) 10/13/2020    CKD (chronic kidney disease) stage 3, GFR 30-59 ml/min ([facility] [address] 10/07/2020       DISCHARGE DIAGNOSES / PLAN:      Acute GI bleed likely diverticular bleed  2. Acute blood loss anemia, improving  3. CKD stage 3b  4. Diabetes  5. CHF  6. Hypertension  9. Bleeding scan shows active bleeding at the region of cecum        DISCHARGE MEDICATIONS:  Current Discharge Medication List        START taking these medications    Details   pantoprazole (PROTONIX) 40 mg tablet Take 1 Tablet by mouth every twelve (12) hours. Qty: 60 Tablet, Refills: 0  Start date: 1/8/2023           CONTINUE these medications which have CHANGED    Details   amiodarone (PACERONE) 100 mg tablet Take 1 Tablet by mouth daily. Qty: 30 Tablet, Refills: 0  Start date: 1/9/2023      carvediloL (COREG) 6.25 mg tablet Take 3 Tablets by mouth two (2) times a day. Qty: 60 Tablet, Refills: 1  Start date: 1/8/2023           CONTINUE these medications which have NOT CHANGED    Details   amLODIPine (NORVASC) 5 mg tablet TAKE ONE (1) TABLET BY MOUTH TWICE DAILY  Qty: 180 Tablet, Refills: 1      ferrous sulfate (IRON) 325 mg (65 mg iron) EC tablet TAKE 1 TABLET TWICE DAILY  Qty: 180 Tab, Refills: 0    Associated Diagnoses: Anemia, unspecified type      sodium bicarbonate 325 mg tablet Take 325 mg by mouth two (2) times a day.       hydrALAZINE (APRESOLINE) 25 mg tablet Take 1 Tab by mouth two (2) times a day. STOP taking these medications       chlorthalidone (HYGROTON) 25 mg tablet Comments:   Reason for Stopping:         glyBURIDE (DIABETA) 2.5 mg tablet Comments:   Reason for Stopping:         glucose blood VI test strips (blood glucose test) strip Comments:   Reason for Stopping:         Blood-Glucose Meter (Blood Glucose Monitoring) monitoring kit Comments:   Reason for Stopping:         lancets misc Comments:   Reason for Stopping:         aspirin delayed-release 81 mg tablet Comments:   Reason for Stopping:         Eliquis 5 mg tablet Comments:   Reason for Stopping:         magnesium oxide (MAG-OX) 400 mg tablet Comments:   Reason for Stopping:         cetirizine (ZYRTEC) 10 mg tablet Comments:   Reason for Stopping:         losartan (COZAAR) 100 mg tablet Comments:   Reason for Stopping:                 NOTIFY YOUR PHYSICIAN FOR ANY OF THE FOLLOWING:   Fever over 101 degrees for 24 hours. Chest pain, shortness of breath, fever, chills, nausea, vomiting, diarrhea, change in mentation, falling, weakness, bleeding. Severe pain or pain not relieved by medications. Or, any other signs or symptoms that you may have questions about. DISPOSITION:  x  Home With:   OT  PT  HH  RN       Long term SNF/Inpatient Rehab    Independent/assisted living    Hospice    Other:       PATIENT CONDITION AT DISCHARGE: Stable      PHYSICAL EXAMINATION AT DISCHARGE:  General:          Alert, cooperative, no distress, appears stated age. HEENT:           Atraumatic, anicteric sclerae, pink conjunctivae                          No oral ulcers, mucosa moist, throat clear, dentition fair  Neck:               Supple, symmetrical  Lungs:             Clear to auscultation bilaterally. No Wheezing or Rhonchi. No rales. Chest wall:      No tenderness  No Accessory muscle use. Heart:              Regular  rhythm,  No  murmur   No edema  Abdomen:        Soft, non-tender. Not distended. Bowel sounds normal  Extremities:     No cyanosis. No clubbing,                            Skin turgor normal, Capillary refill normal  Skin:                Not pale. Not Jaundiced  No rashes   Psych:             Not anxious or agitated. Neurologic:      Alert, moves all extremities, answers questions appropriately and responds to commands     NM ACUTE GI BLEED SCAN   Final Result   Active GI bleed in the region of the cecum. The findings were called to the floor nurse on 1/5/2023 at 10:30 PM by Dr. Rosemary Gomes. [phone]            CT ABD PELV WO CONT   Final Result   1. Several chronic findings including a nonobstructive left lower pole renal   stone, moderate hiatal hernia, bilateral renal cysts, sigmoid diverticulosis,   and lumbar spine degenerative changes. 2.  No acute pathology in the abdomen or pelvis. Recent Results (from the past 24 hour(s))   GLUCOSE, POC    Collection Time: 01/08/23  4:22 PM   Result Value Ref Range    Glucose (POC) 103 (H) 65 - 100 mg/dL    Performed by Tamir Curtis, POC    Collection Time: 01/08/23  7:25 PM   Result Value Ref Range    Glucose (POC) 111 (H) 65 - 100 mg/dL    Performed by [address] [place] [address], POC    Collection Time: 01/09/23  8:33 AM   Result Value Ref Range    Glucose (POC) 129 (H) 65 - 100 mg/dL    Performed by Chandrakant Benavides, POC    Collection Time: 01/09/23 12:09 PM   Result Value Ref Range    Glucose (POC) 181 (H) 65 - 100 mg/dL    Performed by [facility] COURSE:  Patient is a 80y.o. year old female with PMH of HTN, CHF, chronic A. fib CKI stage 3b, and type II DM presents to ED with complaint of bloody stool that began Sunday. She is on Eliquis for A. fib patient experiences bloody stool whenever she attempts to eat or drink water. She states she has not been able to eat since Sunday. The blood is described as a mix of dark and bright red at different times.  Patient had a colonoscopy done >5 years ago which did not reveal any abnormalities. Per daughter, [employer], last meal patient had was shrimp fried rice from unknown restaurant. Patient denies fever and abdominal pain. Only stated pain is in her right ankle, which she sprained. Hgb 5.7 in ED        1/4     After blood transfusion hemoglobin stable    1/5     Patient was seen and examined in room today. She was resting comfortably in bed. Patient reports feeling better. She has been tolerating clear liquid diet well. Denies any nausea, vomiting, constipation, or diarrhea. No blood in stool. Hgb 7.6 today. Na 148, Cl 115, Ca 8.2.     1/6  Patient was seen and examined in room today. Patient reports feeling well. However during bowel prep last night for colonoscopy she had dark colored blood in stool, Hgb 6.9 and was transfused 1 unit of blood. Per nurse she is still having blood in stool.       bleeding scan done shows active bleeding in the region of cecum     1/7     Colonoscopy done shows diverticulosis of large intestine 9 hemorrhoidsResting     Hemoglobin remained stable discharge home follow-up with the PCP follow-up with PCP regarding continuation of Eliquis    Will hold Eliquis at this time    Medication reconciliation done time discharge patient 35 to 40% time spent counseling and coordination of care         Signed:   Mauri Coelho MD  1/9/2023  1:02 PM

## 2023-01-09 NOTE — MED STUDENT NOTES
General Daily Progress Note          Patient Name:   Diana Herrera       YOB: 1935       Age:  80 y.o. Admit Date: 1/3/2023      Subjective:     Patient is a 80y.o. year old female with PMH of HTN, CHF, chronic A. fib CKI stage 3b, and type II DM presents to ED with complaint of bloody stool that began Sunday. She is on Eliquis for A. fib patient experiences bloody stool whenever she attempts to eat or drink water. She states she has not been able to eat since Sunday. The blood is described as a mix of dark and bright red at different times. Patient had a colonoscopy done >5 years ago which did not reveal any abnormalities. Per daughter, Tere Morton, last meal patient had was shrimp fried rice from unknown restaurant. Patient denies fever and abdominal pain. Only stated pain is in her right ankle, which she sprained. Hgb 5.7 in ED       1/4  After blood transfusion hemoglobin stable    1/5  Patient was seen and examined in room today. She was resting comfortably in bed. Patient reports feeling better. She has been tolerating clear liquid diet well. Denies any nausea, vomiting, constipation, or diarrhea. No blood in stool. Hgb 7.6 today. Na 148, Cl 115, Ca 8.2.    1/6  Patient was seen and examined in room today. Patient reports feeling well. However during bowel prep last night for colonoscopy she had dark colored blood in stool, Hgb 6.9 and was transfused 1 unit of blood. Per nurse she is still having blood in stool. bleeding scan done shows active bleeding in the region of cecum    1/7  Colonoscopy done shows diverticulosis of large intestine 9 hemorrhoidsResting     1/9  Patient seen and examined today in room. Pt reports feeling well.          Objective:     Visit Vitals  /68   Pulse 85   Temp 98.4 °F (36.9 °C)   Resp 20   Ht 4' 11\" (1.499 m)   Wt 64 kg (141 lb 1.5 oz)   SpO2 97%   BMI 28.50 kg/m²        Recent Results (from the past 24 hour(s))   GLUCOSE, POC    Collection Time: 01/08/23 12:13 PM   Result Value Ref Range    Glucose (POC) 149 (H) 65 - 100 mg/dL    Performed by Michael Burns    GLUCOSE, POC    Collection Time: 01/08/23  4:22 PM   Result Value Ref Range    Glucose (POC) 103 (H) 65 - 100 mg/dL    Performed by Michael Burns    GLUCOSE, POC    Collection Time: 01/08/23  7:25 PM   Result Value Ref Range    Glucose (POC) 111 (H) 65 - 100 mg/dL    Performed by Juwan Kumari      [unfilled]      Review of Systems    Constitutional: Negative for chills and fever. HENT: Negative. Eyes: Negative. Respiratory: Negative. Cardiovascular: Negative. Gastrointestinal: Negative for abdominal pain and nausea. Skin: Negative. Neurological: Negative. Physical Exam:      Constitutional: pt is oriented to person, place, and time. HENT:   Head: Normocephalic and atraumatic. Eyes: Pupils are equal, round, and reactive to light. EOM are normal.   Cardiovascular: Normal rate, regular rhythm and normal heart sounds. Pulmonary/Chest: Breath sounds normal. No wheezes. No rales. Exhibits no tenderness. Abdominal: Soft. Bowel sounds are normal. There is no abdominal tenderness. There is no rebound and no guarding. Musculoskeletal: Normal range of motion. Neurological: pt is alert and oriented to person, place, and time. NM ACUTE GI BLEED SCAN   Final Result   Active GI bleed in the region of the cecum. The findings were called to the floor nurse on 1/5/2023 at 10:30 PM by Dr. Iker Jacobo. 789            CT ABD PELV WO CONT   Final Result   1. Several chronic findings including a nonobstructive left lower pole renal   stone, moderate hiatal hernia, bilateral renal cysts, sigmoid diverticulosis,   and lumbar spine degenerative changes. 2.  No acute pathology in the abdomen or pelvis.            Recent Results (from the past 24 hour(s))   GLUCOSE, POC    Collection Time: 01/08/23 12:13 PM   Result Value Ref Range    Glucose (POC) 149 (H) 65 - 100 mg/dL    Performed by Angelo Mcwilliams, POC    Collection Time: 01/08/23  4:22 PM   Result Value Ref Range    Glucose (POC) 103 (H) 65 - 100 mg/dL    Performed by John ALBARRAN, POC    Collection Time: 01/08/23  7:25 PM   Result Value Ref Range    Glucose (POC) 111 (H) 65 - 100 mg/dL    Performed by Sofia Moritz        Results       ** No results found for the last 336 hours. **             Labs:     Recent Labs     01/07/23  1544 01/06/23  1654   WBC 8.1 7.7   HGB 11.4* 11.5   HCT 35.6 35.8    163       Recent Labs     01/07/23  1544 01/06/23  1654    145   K 4.0 3.7   * 116*   CO2 28 23   BUN 16 23*   CREA 1.24* 1.04*   * 185*   CA 8.7 8.6       Recent Labs     01/07/23  1544 01/06/23  1654   ALT 18 16   AP 66 62   TBILI 0.6 0.5   TP 6.0* 5.7*   ALB 2.8* 2.6*   GLOB 3.2 3.1       No results for input(s): INR, PTP, APTT, INREXT, INREXT in the last 72 hours. No results for input(s): FE, TIBC, PSAT, FERR in the last 72 hours. No results found for: FOL, RBCF   No results for input(s): PH, PCO2, PO2 in the last 72 hours. No results for input(s): CPK, CKNDX, TROIQ in the last 72 hours.     No lab exists for component: CPKMB  Lab Results   Component Value Date/Time    Cholesterol, total 181 11/05/2020 12:48 PM    HDL Cholesterol 66 11/05/2020 12:48 PM    LDL, calculated 96 11/05/2020 12:48 PM    LDL, calculated 91 03/11/2019 03:16 PM    Triglyceride 105 11/05/2020 12:48 PM     Lab Results   Component Value Date/Time    Glucose (POC) 111 (H) 01/08/2023 07:25 PM    Glucose (POC) 103 (H) 01/08/2023 04:22 PM    Glucose (POC) 149 (H) 01/08/2023 12:13 PM    Glucose (POC) 110 (H) 01/08/2023 08:12 AM    Glucose (POC) 156 (H) 01/07/2023 09:02 PM     Lab Results   Component Value Date/Time    Color Yellow/Straw 01/03/2023 02:52 PM    Appearance Clear 01/03/2023 02:52 PM    Specific gravity 1.018 01/03/2023 02:52 PM    Specific gravity 1.019 06/11/2021 02:45 PM    pH (UA) 5.0 01/03/2023 02:52 PM    Protein Negative 01/03/2023 02:52 PM    Glucose Negative 01/03/2023 02:52 PM    Ketone Negative 01/03/2023 02:52 PM    Bilirubin Negative 01/03/2023 02:52 PM    Urobilinogen 0.1 01/03/2023 02:52 PM    Nitrites Negative 01/03/2023 02:52 PM    Leukocyte Esterase Small (A) 01/03/2023 02:52 PM    Epithelial cells Few 01/03/2023 02:52 PM    Bacteria Negative 01/03/2023 02:52 PM    WBC 5-10 01/03/2023 02:52 PM    RBC 0-5 01/03/2023 02:52 PM         Assessment:     Acute GI bleed likely diverticular bleed  2. Acute blood loss anemia, improving  3. CKD stage 3b  4. Diabetes  5. CHF  6. Hypertension  9.   Bleeding scan shows active bleeding at the region of cecum    Plan:   Eliquis held by GI  Continue protonix 40 mg IV    Discharge home today  Continue follow up with PCP regarding continuation of Eliquis  Follow up with GI outpatient        Current Facility-Administered Medications:     pantoprazole (PROTONIX) tablet 40 mg, 40 mg, Oral, Q12H, Maddie Reynoso MD, 40 mg at 01/08/23 2136    0.9% sodium chloride infusion 250 mL, 250 mL, IntraVENous, PRN, Corinna Ruiz MD    amiodarone (CORDARONE) tablet 100 mg, 100 mg, Oral, DAILY, Maddie Reynoso MD, 100 mg at 01/08/23 7856    0.9% sodium chloride infusion 250 mL, 250 mL, IntraVENous, PRN, Maddie Reynoso MD    amLODIPine (NORVASC) tablet 5 mg, 5 mg, Oral, DAILY, Maddie Reynoso MD, 5 mg at 01/08/23 0920    carvediloL (COREG) tablet 18.75 mg, 18.75 mg, Oral, BID, Maddie Reynoso MD, 18.75 mg at 01/08/23 2135    ferrous sulfate tablet 325 mg, 325 mg, Oral, BID, Maddie Reynoso MD, 325 mg at 01/08/23 2136    hydrALAZINE (APRESOLINE) tablet 25 mg, 25 mg, Oral, BID, Maddie Reynoso MD, 25 mg at 01/08/23 2136    sodium bicarbonate tablet 325 mg, 325 mg, Oral, BID, Maddie Reynoso MD, 325 mg at 01/08/23 2136    insulin lispro (HUMALOG) injection, , SubCUTAneous, AC&HS, Maddie Reynoso MD, 3 Units at 01/08/23 1226    glucose chewable tablet 16 g, 4 Tablet, Oral, PRN, Hannah Reynoso MD    glucagon (GLUCAGEN) injection 1 mg, 1 mg, IntraMUSCular, PRN, Hannah Reynoso MD    acetaminophen (TYLENOL) tablet 650 mg, 650 mg, Oral, Q6H PRN, 650 mg at 01/08/23 1802 **OR** acetaminophen (TYLENOL) suppository 650 mg, 650 mg, Rectal, Q6H PRN, Maddie Reynoso MD    polyethylene glycol (MIRALAX) packet 17 g, 17 g, Oral, DAILY PRN, Maddie Reynoso MD    ondansetron (ZOFRAN ODT) tablet 4 mg, 4 mg, Oral, Q8H PRN **OR** ondansetron (ZOFRAN) injection 4 mg, 4 mg, IntraVENous, Q6H PRN, Stanley Hugo MD

## 2023-01-09 NOTE — PROGRESS NOTES
VSS. Patient discharged home self care. Discharge instructions given to patient and daughter at bedside. Telemetry discontinued. Discharge plan of care/case management plan validated with provider discharge order.

## 2023-01-09 NOTE — PROGRESS NOTES
DCP is for patient to go home with her daughter. Daughter is scheduled to  patient at 2 pm today. CM in to speak with patient about discharge and asked if patient needed any HH. Patient states she is going home with her daughter and declined [facility] services at discharge. Medicare pt has received, reviewed, and signed 2nd IM letter informing them of their right to appeal the discharge. Signed copied has been placed on pt bedside chart. Discharge plan of care/case management plan validated with provider discharge order.

## 2023-01-10 ENCOUNTER — PATIENT OUTREACH (OUTPATIENT)
Dept: CASE MANAGEMENT | Age: 88
End: 2023-01-10

## 2023-01-10 NOTE — PROGRESS NOTES
Care Transitions Initial Call    Call within 2 business days of discharge: Yes     Patient: Joanne Portillo Patient : 1935 MRN: [id number]    Last Discharge  Street       Date Complaint Diagnosis Description Type Department Provider    1/3/23 Rectal Bleeding Anemia, unspecified type . .. ED to Hosp-Admission (Discharged) (ADMIT) Viktoriya Riggs MD; Anthony Cano. .. Was this an external facility discharge? No Discharge Facility: na    Challenges to be reviewed by the provider   Additional needs identified to be addressed with provider: yes  medications- pt has not picked up protonix as of 1/10/2023, needs updated PHI         Method of communication with provider : none    Advance Care Planning:   Does patient have an Advance Directive: yes, reviewed and current. Inpatient Readmission Risk score: Unplanned Readmit Risk Score: 14.3    Was this a readmission? no   Patient stated reason for the admission: pt states that she is unsure but called the rescue squad for blood in her stool    Patients top risk factors for readmission: medical condition-GI bleed    Interventions to address risk factors: Obtained and reviewed discharge summary and/or continuity of care documents    Care Transition Nurse (CTN) contacted the patient by telephone to perform post hospital discharge assessment. Verified name and  with patient as identifiers. Provided introduction to self, and explanation of the CTN role. CTN reviewed discharge instructions, medical action plan and red flags with patient who verbalized understanding. Were discharge instructions available to patient? no. Reviewed appropriate site of care based on symptoms and resources available to patient including: PCP, Specialist, and When to call 911. Patient given an opportunity to ask questions and does not have any further questions or concerns at this time.  The patient agrees to contact the PCP office for questions related to their healthcare. Medication reconciliation was performed with patient, who verbalizes understanding of administration of home medications. Advised obtaining a 90-day supply of all daily and as-needed medications. Referral to Pharm D needed: no     Home Health/Outpatient orders at discharge: refused    Discussed follow-up appointments. If no appointment was previously scheduled, appointment scheduling offered:  na . Is follow up appointment scheduled within 7 days of discharge? yes. [address] follow up appointment(s):   Future Appointments   Date Time Provider Loni Hamilton   2/3/2023  2:20 PM Jens Edwards MD [facility] BS AMB   8/18/2023  1:40 PM Melvi Putnam PsyD [address] [facility] BS AMB     [facility] follow up appointment(s): PCP 1/16/2023, GI 1/24/2023    Plan for follow-up call in 5-7 days based on severity of symptoms and risk factors. Plan for next call:  will review follow up appt with PCP, review medications, review any s/s of bleed,     CTN provided contact information for future needs. Goals Addressed                   This Visit's Progress     Prevent complications post hospitalization. 1/10/2023  Performed medication reconciliation with pt. Pt reports that she has not picked up medication prescribed by hospital and uses pill bottles for maintenance medications. Pt states that she is aware to stop a number of medications. Pt confirmed that she is at her daughter's home for a few days. Pt confirmed follow up appts and reports that she does have a ride [facility]. We reviewed red flags on discharge paperwork and pt states understanding to call 911 for a medical emergency. Pt reports that her daughter checks on her every day and that she has neighbors checking on her as well. Pt states understanding to check for blood in stool. Pt is agreeable to a call once a week to review medications and diet and to follow up on her PCP appt on 1/16/2023.   Dena Dhillon RN CHFN CCM, Care Transitions Nurse, [phone]

## 2023-01-10 NOTE — PROGRESS NOTES
Care Transitions Outreach Attempt    Call within 2 business days of discharge: Yes   Attempted to reach patient for transitions of care follow up. Unable to reach patient. Left voicemail stating my name, CTN with [address], date and time of call, and return telephone number. Patient: Eden Torres Patient : 1935 MRN: [id number]    Last Discharge [address]       Date Complaint Diagnosis Description Type Department Provider    1/3/23 Rectal Bleeding Anemia, unspecified type . .. ED to Hosp-Admission (Discharged) (ADMIT) Mehnaz Brooks MD; Montrell Robledo. .. Was this an external facility discharge?  No Discharge Facility: na      Noted following upcoming appointments from discharge chart review:   [facility] follow up appointment(s):   Future Appointments   Date Time Provider Loni Hamilton   2023 10:00 AM Radha Brothers NP [facility] BS AMB   2023  1:40 PM Melvi Putnam PsyD [facility] BS AMB     Non-BS follow up appointment(s): GI 2023

## 2023-01-17 ENCOUNTER — PATIENT OUTREACH (OUTPATIENT)
Dept: CASE MANAGEMENT | Age: 88
End: 2023-01-17

## 2023-01-17 NOTE — PROGRESS NOTES
Care Transitions Follow Up Call    Challenges to be reviewed by the provider   Additional needs identified to be addressed with provider: no  none           Method of communication with provider : none    Care Transition Nurse (CTN) contacted the patient by telephone to follow up after admission on 1/3/2023. Verified name and  with patient as identifiers. Addressed changes since last contact:  pt is back home living independently, PCP appt moved to 2/3/2023      Patients top risk factors for readmission: medical condition-GI bleed    Interventions to address risk factors:  confirmed follow up with GI provider    [facility] follow up appointment(s):   Future Appointments   Date Time Provider Loni Hamilton   2/3/2023  2:20 PM Narinder Nava MD [facility] BS AMB   2023  1:40 PM Melvi Putnam PsyD [facility] BS AMB     Non-BS follow up appointment(s): GI 2023    CTN provided contact information for future needs. Plan for follow-up call in 5-7 days based on severity of symptoms and risk factors. Plan for next call:  remind pt of GI follow up on 2023       Goals Addressed                   This Visit's Progress     Prevent complications post hospitalization. 1/10/2023  Performed medication reconciliation with pt. Pt reports that she has not picked up medication prescribed by hospital and uses pill bottles for maintenance medications. Pt states that she is aware to stop a number of medications. Pt confirmed that she is at her daughter's home for a few days. Pt confirmed follow up appts and reports that she does have a ride to appts. We reviewed red flags on discharge paperwork and pt states understanding to call 911 for a medical emergency. Pt reports that her daughter checks on her every day and that she has neighbors checking on her as well. Pt states understanding to check for blood in stool.   Pt is agreeable to a call once a week to review medications and diet and to follow up on her PCP appt on 1/16/2023. Cricket Santiago RN [address] [facility], Care Transitions Nurse, [phone]     1/17/2023  Pt reports that PCP moved the follow up appt to 2/3/2023. Reviewed that pt has a follow up with GI on 1/24/2023 and pt asked that I call her daughter Robby Harris to let her know. VM left on that telephone stating my name, CTN with [facility], date and time of call, and the appt for 1/24/2023. Pt reports that she does have medication for her stomach and is doing fine. Pt reports that she is back home and reports that she is hobbling around. Pt reports that she sprained her ankle prior to hospital visit and is in a brace. Pt reports that she is taking tylenol for pain and it is helping. Pt reminded to be careful when ambulating. Pt reports that she does not have to use a cane or a walker. Pt reports that her daughter checks on her every day. Pt is agreeable to a call on 1/23/2023 to remind her of follow up appt with GI doctor.   Cricket Santiago RN [address] [facility], Care Transitions Nurse, [phone]

## 2023-01-20 ENCOUNTER — PATIENT OUTREACH (OUTPATIENT)
Dept: CASE MANAGEMENT | Age: 88
End: 2023-01-20

## 2023-01-20 NOTE — PROGRESS NOTES
Care Transitions Outreach Attempt      Attempted to reach patient for transitions of care follow up. Unable to reach patient. Left voicemail stating my name, CTN with [address], date and time of call, and return telephone number. Patient: Johanna Aiken Patient : 1935 MRN: [id number]    Last Discharge [address]       Date Complaint Diagnosis Description Type Department Provider    1/3/23 Rectal Bleeding Anemia, unspecified type . .. ED to Hosp-Admission (Discharged) (ADMIT) Anthony Gordon MD; Calli Rockwell. .. Was this an external facility discharge?  No Discharge Facility: na      Noted following upcoming appointments from discharge chart review:   [facility] follow up appointment(s):   Future Appointments   Date Time Provider Loni Hamilton   2/3/2023  2:20 PM Rodrigo Correa MD [facility] BS AMB   2023  1:40 PM Melvi Putnam PsyD [facility] BS AMB     Non-[facility] follow up appointment(s): GI on 2023

## 2023-01-23 ENCOUNTER — PATIENT OUTREACH (OUTPATIENT)
Dept: CASE MANAGEMENT | Age: 88
End: 2023-01-23

## 2023-01-23 NOTE — PROGRESS NOTES
Care Transitions Follow Up Call    Challenges to be reviewed by the provider   Additional needs identified to be addressed with provider: yes  Pt reports swelling in her foot and leg, same side as sprained ankle, pt will elevate and reports that swelling gets worse throughout the day           Method of communication with provider : none    Care Transition Nurse (CTN) contacted the patient by telephone to follow up after admission on 1/3/2023. Verified name and  with patient as identifiers. Addressed changes since last contact: none  Interventions to address risk factors:  discussed elevating foot that is painful    [facility] follow up appointment(s):   Future Appointments   Date Time Provider Loni Hamilton   2/3/2023  2:20 PM Kerrie Steele MD [facility] BS AMB   2023  1:40 PM Melvi Putnam PsyD [facility] BS AMB     Non-BS follow up appointment(s): GI 2023    CTN provided contact information for future needs. Plan for follow-up call in 3-5 days based on severity of symptoms and risk factors. Plan for next call:  will review GI follow up and discuss painful foot and swelling       Goals Addressed                   This Visit's Progress     Prevent complications post hospitalization. 1/10/2023  Performed medication reconciliation with pt. Pt reports that she has not picked up medication prescribed by hospital and uses pill bottles for maintenance medications. Pt states that she is aware to stop a number of medications. Pt confirmed that she is at her daughter's home for a few days. Pt confirmed follow up appts and reports that she does have a ride to appts. We reviewed red flags on discharge paperwork and pt states understanding to call 911 for a medical emergency. Pt reports that her daughter checks on her every day and that she has neighbors checking on her as well. Pt states understanding to check for blood in stool.   Pt is agreeable to a call once a week to review medications and diet and to follow up on her PCP appt on 1/16/2023. Sam Portillo RN [address] [facility], Care Transitions Nurse, [phone]     1/17/2023  Pt reports that PCP moved the follow up appt to 2/3/2023. Reviewed that pt has a follow up with GI on 1/24/2023 and pt asked that I call her daughter Gris Ojeda to let her know. VM left on that telephone stating my name, CTN with [email or web address], date and time of call, and the appt for 1/24/2023. Pt reports that she does have medication for her stomach and is doing fine. Pt reports that she is back home and reports that she is hobbling around. Pt reports that she sprained her ankle prior to hospital visit and is in a brace. Pt reports that she is taking tylenol for pain and it is helping. Pt reminded to be careful when ambulating. Pt reports that she does not have to use a cane or a walker. Pt reports that her daughter checks on her every day. Pt is agreeable to a call on 1/23/2023 to remind her of follow up appt with GI doctor. Sam Portillo RN [address] [facility], Care Transitions Nurse, [phone]     1/20/2023  Called pt's daughter Dominga Gottlieb and LVM concerning follow up with GI on 1/24/2023. Left voicemail stating my name, CTN with [email or web address], date and time of call, and return telephone number. Sam Portillo RN [address] [facility], Care Transitions Nurse, [phone]     1/23/2023  Pt reports that her leg and foot are swollen on the leg with the sprained ankle. Advised pt to try to elevate as much as possible and that she could try ice, max 20 min on but to try to elevate as much as possible. Confirmed with pt that she has a GI appt on 1/24/2023 and she reports that her daughter is aware. Pt reports that she is doing okay except for the leg. Pt is agreeable to a call on Thursday to follow up on GI appt and to check on leg. Will also begin reviewing other healthcare needs.   Sam Portillo RN [address] [facility], Care Transitions Nurse, [phone]

## 2023-01-25 ENCOUNTER — PATIENT OUTREACH (OUTPATIENT)
Dept: CASE MANAGEMENT | Age: 88
End: 2023-01-25

## 2023-01-25 ENCOUNTER — TELEPHONE (OUTPATIENT)
Dept: FAMILY MEDICINE CLINIC | Age: 88
End: 2023-01-25

## 2023-01-25 NOTE — PROGRESS NOTES
Care Transitions Follow Up Call    Challenges to be reviewed by the provider   Additional needs identified to be addressed with provider: yes  Swollen ankle, GI follow up           Method of communication with provider : ccLink, face to face, Perfect Serve message, phone, staff message, and none    Care Transition Nurse (CTN) contacted the patient by telephone to follow up after admission on 1/3/2023. Verified name and  with patient as identifiers. Addressed changes since last contact:  pt had a follow up with GI on 2023 , pt states that her daughter is working on this  Follow up appointment completed? no.   Was follow up appointment scheduled within 7 days of discharge? no.       Interventions to address risk factors:  message sent to PCP for follow up for swollen ankle    [address] follow up appointment(s):   Future Appointments   Date Time Provider Loni Hamilton   2/3/2023  2:20 PM Sana Hollins MD CF BS AMB   2023  1:40 PM Melvi Putnam PsyD NEUROWTC BS AMB     Non-BS follow up appointment(s): had GI provider appt for 2023    CTN provided contact information for future needs. Plan for follow-up call in 1-2 days based on severity of symptoms and risk factors. Plan for next call:  will review follow up appts, symptoms       Goals Addressed                   This Visit's Progress     Prevent complications post hospitalization. 1/10/2023  Performed medication reconciliation with pt. Pt reports that she has not picked up medication prescribed by hospital and uses pill bottles for maintenance medications. Pt states that she is aware to stop a number of medications. Pt confirmed that she is at her daughter's home for a few days. Pt confirmed follow up appts and reports that she does have a ride to [employer]. We reviewed red flags on discharge paperwork and pt states understanding to call 911 for a medical emergency.   Pt reports that her daughter checks on her every day and that she has neighbors checking on her as well. Pt states understanding to check for blood in stool. Pt is agreeable to a call once a week to review medications and diet and to follow up on her PCP appt on 1/16/2023. Marlene Lujan RN [address] [facility], Care Transitions Nurse, [phone]     1/17/2023  Pt reports that PCP moved the follow up appt to 2/3/2023. Reviewed that pt has a follow up with GI on 1/24/2023 and pt asked that I call her daughter Ravinder Worrell to let her know. VM left on that telephone stating my name, CTN with [address], date and time of call, and the appt for 1/24/2023. Pt reports that she does have medication for her stomach and is doing fine. Pt reports that she is back home and reports that she is hobbling around. Pt reports that she sprained her ankle prior to hospital visit and is in a brace. Pt reports that she is taking tylenol for pain and it is helping. Pt reminded to be careful when ambulating. Pt reports that she does not have to use a cane or a walker. Pt reports that her daughter checks on her every day. Pt is agreeable to a call on 1/23/2023 to remind her of follow up appt with GI doctor. Marlene Lujan RN [address] [facility], Care Transitions Nurse, [phone]     1/20/2023  Called pt's daughter Alfredo Torres and LVM concerning follow up with GI on 1/24/2023. Left voicemail stating my name, CTN with [address], date and time of call, and return telephone number. Marlene Lujan RN [address] [facility], Care Transitions Nurse, [phone]     1/23/2023  Pt reports that her leg and foot are swollen on the leg with the sprained ankle. Advised pt to try to elevate as much as possible and that she could try ice, max 20 min on but to try to elevate as much as possible. Confirmed with pt that she has a GI appt on 1/24/2023 and she reports that her daughter is aware. Pt reports that she is doing okay except for the leg.   Pt is agreeable to a call on Thursday to follow up on GI appt and to check on leg.  Will also begin reviewing other healthcare needs. Elda Dhillon RN [address] [facility], Care Transitions Nurse, [phone]     1/25/2023  Pt reports that she is on the phone with a [employer] nurse who is reaching out to PCP. Pt reports that ankle remains swollen. Pt states that her daughter is working on an appt with GI doctor. Pt is agreeable to a call tomorrow to follow up. I asked pt if she had a telephone number for [facility] nurse and she reports that nurse is on the other line.   Elda Dhillon RN [address] [facility], Care Transitions Nurse, [phone]

## 2023-01-26 ENCOUNTER — PATIENT OUTREACH (OUTPATIENT)
Dept: CASE MANAGEMENT | Age: 88
End: 2023-01-26

## 2023-01-26 NOTE — PROGRESS NOTES
Care Transitions Outreach Attempt    Attempted to reach patient for transitions of care follow up. Unable to reach patient. Left voicemail stating my name, CTN with [address], date and time of call, and return telephone number. Patient: Fab Anne Patient : 1935 MRN: [id number]    Last Discharge [address]       Date Complaint Diagnosis Description Type Department Provider    1/3/23 Rectal Bleeding Anemia, unspecified type . .. ED to Hosp-Admission (Discharged) (ADMIT) Hermilo Sen MD; Jojo Langston. ..             Noted following upcoming appointments from discharge chart review:   [facility] follow up appointment(s):   Future Appointments   Date Time Provider Loni Hamilton   2/3/2023  2:20 PM Sridevi Mir MD CF BS AMB   2023  1:40 PM Melvi Putnam PsyD NEUROWTC BS AMB     Non-[facility] follow up appointment(s): GI?

## 2023-01-27 ENCOUNTER — PATIENT OUTREACH (OUTPATIENT)
Dept: CASE MANAGEMENT | Age: 88
End: 2023-01-27

## 2023-01-27 NOTE — PROGRESS NOTES
Care Transitions Follow Up Call    Challenges to be reviewed by the provider   Additional needs identified to be addressed with provider: yes  Daughter confirms that leg with ankle sprain is swollen but has no heat or other symptoms and reports that pt states swelling is decreasing. Daughter encouraged to have leg evaluated. Method of communication with provider : none    Care Transition Nurse (CTN) contacted the family by telephone to follow up after admission on 1/3/2023. Verified name and  with family as identifiers. Addressed changes since last contact: none    Interventions to address risk factors:  encouraged pt's daughter to have leg evaluated    [facility] follow up appointment(s):   Future Appointments   Date Time Provider Loni Hamilton   2/3/2023  2:20 PM Blanco Bean MD CFM BS AMB   2023  1:40 PM Melvi Putnam PsyD NEUROWTC BS AMB     Non-BS follow up appointment(s): Gi 2023    CTN provided contact information for future needs. Plan for follow-up call in 3-5 days based on severity of symptoms and risk factors. Plan for next call:  review leg swelling       Goals Addressed                   This Visit's Progress     Prevent complications post hospitalization. 1/10/2023  Performed medication reconciliation with pt. Pt reports that she has not picked up medication prescribed by hospital and uses pill bottles for maintenance medications. Pt states that she is aware to stop a number of medications. Pt confirmed that she is at her daughter's home for a few days. Pt confirmed follow up appts and reports that she does have a ride to appts. We reviewed red flags on discharge paperwork and pt states understanding to call 911 for a medical emergency. Pt reports that her daughter checks on her every day and that she has neighbors checking on her as well. Pt states understanding to check for blood in stool.   Pt is agreeable to a call once a week to review medications and diet and to follow up on her PCP appt on 1/16/2023. Jana Mayer RN [address] [facility], Care Transitions Nurse, [phone]     1/17/2023  Pt reports that PCP moved the follow up appt to 2/3/2023. Reviewed that pt has a follow up with GI on 1/24/2023 and pt asked that I call her daughter Nallely Cardona to let her know. VM left on that telephone stating my name, CTN with [employer], date and time of call, and the appt for 1/24/2023. Pt reports that she does have medication for her stomach and is doing fine. Pt reports that she is back home and reports that she is hobbling around. Pt reports that she sprained her ankle prior to hospital visit and is in a brace. Pt reports that she is taking tylenol for pain and it is helping. Pt reminded to be careful when ambulating. Pt reports that she does not have to use a cane or a walker. Pt reports that her daughter checks on her every day. Pt is agreeable to a call on 1/23/2023 to remind her of follow up appt with GI doctor. Jana Mayer RN [address] [facility], Care Transitions Nurse, [phone]     1/20/2023  Called pt's daughter Escobar Haddad and LVM concerning follow up with GI on 1/24/2023. Left voicemail stating my name, CTN with [employer], date and time of call, and return telephone number. Jana Mayer RN [address] [facility], Care Transitions Nurse, [phone]     1/23/2023  Pt reports that her leg and foot are swollen on the leg with the sprained ankle. Advised pt to try to elevate as much as possible and that she could try ice, max 20 min on but to try to elevate as much as possible. Confirmed with pt that she has a GI appt on 1/24/2023 and she reports that her daughter is aware. Pt reports that she is doing okay except for the leg. Pt is agreeable to a call on Thursday to follow up on GI appt and to check on leg. Will also begin reviewing other healthcare needs.   Jana Mayer RN [address] [facility], Care Transitions Nurse, [phone]     1/25/2023  Pt reports that she is on the phone with a [employer] nurse who is reaching out to PCP. Pt reports that ankle remains swollen. Pt states that her daughter is working on an appt with GI doctor. Pt is agreeable to a call tomorrow to follow up. I asked pt if she had a telephone number for [facility] nurse and she reports that nurse is on the other line. Millie Burch RN [address] [facility], Care Transitions Nurse, [phone]     1/26/2023  Unable to LVM on pt's telephones. Left voicemail stating my name, CTN with Kishore Osborn, date and time of call, and return telephone number on emergency contact's telephone. Millie Burch RN [address] [facility], Care Transitions Nurse, [phone]     1/27/2023  Spoke with pt's daughter Gonzalo Armas who reports that pt states leg is improving. Pt's daughter reports that she encouraged the pt to get the leg looked at (same leg as ankle sprain) and pt declined to do so. Explained to pt's daughter that it would be a good idea to have the leg checked out to rule out a serious issue such a DVT and pt's daughter reports that she will encourage this. Pt's daughter confirmed follow up with PCP on 2/3/2023 and reports GI follow up on 2/23/203. Will call next week to follow up on pt and leg swelling.   Millie Burch RN [address] [facility], Care Transitions Nurse, [phone]

## 2023-02-02 ENCOUNTER — PATIENT OUTREACH (OUTPATIENT)
Dept: CASE MANAGEMENT | Age: 88
End: 2023-02-02

## 2023-02-02 NOTE — PROGRESS NOTES
Care Transitions Follow Up Call    Challenges to be reviewed by the provider   Additional needs identified to be addressed with provider: yes  Pt reports that right leg, below the knee, is swollen and that comes and goes. Pt confirms that this is the ankle that she injured prior to hospital stay. Method of communication with provider : none    Care Transition Nurse (CTN) contacted the patient by telephone to follow up after admission on 1/3/2023. Verified name and  with patient as identifiers. Addressed changes since last contact:  pt will attend PCP follow up on 2/3/2023 at 2:20  Follow up appointment completed? no.   Was follow up appointment scheduled within 7 days of discharge? no.       Interventions to address risk factors:  pt reports swelling in right leg, below the knee and that she does elevate that leg and is wearing compression stockings. [facility] follow up appointment(s):   Future Appointments   Date Time Provider Loni Hamilton   2/3/2023  2:20 PM Mamta Roque MD CF BS AMB   2023  1:40 PM Melvi Putnam PsyD NEUROWTC BS [facility]     Non-[facility] follow up appointment(s):  2023    CTN provided contact information for future needs. Plan for follow-up call in 1-2 days based on severity of symptoms and risk factors. Plan for next call:  review follow up appt after PCP visit       Goals Addressed                   This Visit's Progress     Prevent complications post hospitalization. 1/10/2023  Performed medication reconciliation with pt. Pt reports that she has not picked up medication prescribed by hospital and uses pill bottles for maintenance medications. Pt states that she is aware to stop a number of medications. Pt confirmed that she is at her daughter's home for a few days. Pt confirmed follow up appts and reports that she does have a ride to [employer].   We reviewed red flags on discharge paperwork and pt states understanding to call 911 for a medical emergency. Pt reports that her daughter checks on her every day and that she has neighbors checking on her as well. Pt states understanding to check for blood in stool. Pt is agreeable to a call once a week to review medications and diet and to follow up on her PCP appt on 1/16/2023. Viraj Mckinnon RN [address] [facility], Care Transitions Nurse, [phone]     1/17/2023  Pt reports that PCP moved the follow up appt to 2/3/2023. Reviewed that pt has a follow up with GI on 1/24/2023 and pt asked that I call her daughter Citlalli Martínez to let her know. VM left on that telephone stating my name, CTN with [employer], date and time of call, and the appt for 1/24/2023. Pt reports that she does have medication for her stomach and is doing fine. Pt reports that she is back home and reports that she is hobbling around. Pt reports that she sprained her ankle prior to hospital visit and is in a brace. Pt reports that she is taking tylenol for pain and it is helping. Pt reminded to be careful when ambulating. Pt reports that she does not have to use a cane or a walker. Pt reports that her daughter checks on her every day. Pt is agreeable to a call on 1/23/2023 to remind her of follow up appt with GI doctor. Viraj Mckinnon RN [address] [facility], Care Transitions Nurse, [phone]     1/20/2023  Called pt's daughter Xiomara Tomlinson and LVM concerning follow up with GI on 1/24/2023. Left voicemail stating my name, CTN with [employer], date and time of call, and return telephone number. Viraj Mckinnon RN [address] [facility], Care Transitions Nurse, [phone]     1/23/2023  Pt reports that her leg and foot are swollen on the leg with the sprained ankle. Advised pt to try to elevate as much as possible and that she could try ice, max 20 min on but to try to elevate as much as possible. Confirmed with pt that she has a GI appt on 1/24/2023 and she reports that her daughter is aware. Pt reports that she is doing okay except for the leg.   Pt is agreeable to a call on Thursday to follow up on GI appt and to check on leg. Will also begin reviewing other healthcare needs. Maged Sen RN [facility], Care Transitions Nurse, [phone]     1/25/2023  Pt reports that she is on the phone with a [employer] nurse who is reaching out to PCP. Pt reports that ankle remains swollen. Pt states that her daughter is working on an appt with GI doctor. Pt is agreeable to a call tomorrow to follow up. I asked pt if she had a telephone number for [facility] [employer] [facility] nurse and she reports that nurse is on the other line. Maged Sen RN 17[address] [facility] Care Transitions Nurse, [phone]     1/26/2023  Unable to LVM on pt's telephones. Left voicemail stating my name, CTN with [address], date and time of call, and return telephone number on emergency contact's telephone. Maged Sen RN 1701 [facility] Care Transitions Nurse, [phone]     1/27/2023  Spoke with pt's daughter Jo Ann Newby who reports that pt states leg is improving. Pt's daughter reports that she encouraged the pt to get the leg looked at (same leg as ankle sprain) and pt declined to do so. Explained to pt's daughter that it would be a good idea to have the leg checked out to rule out a serious issue such a DVT and pt's daughter reports that she will encourage this. Pt's daughter confirmed follow up with PCP on 2/3/2023 and reports GI follow up on 2/23/203. Will call next week to follow up on pt and leg swelling. Maged Sen RN [address] [facility], Care Transitions Nurse, [phone]     2/2/2023  Pt reports that she is doing okay and reports that there is swelling on R leg up to knee. Pt confirms that this is the ankle that she injured prior to the hospital.  Pt reports that swelling is no worse and reports that she does use compression stockings as she has neuropathy in that leg. Pt reports that she does elevate legs when seated. Pt reports no pain and states that it feels tight.   Pt reports that it is swollen some days, worse than others. Pt reports that one concern is lack of taste. Pt reports that she is challenged to eat and states that she can taste sweet, sour and peppery but not much else and makes it difficult for her to figure out what to eat. Pt states that she needs to eat to take medications and reports that she has food. Pt is agreeable to a call tomorrow afternoon after PCP appt to follow up on leg and appetite.   Jasen Flannery RN [address] [facility], Care Transitions Nurse, [phone]

## 2023-02-03 ENCOUNTER — OFFICE VISIT (OUTPATIENT)
Dept: FAMILY MEDICINE CLINIC | Age: 88
End: 2023-02-03
Payer: MEDICARE

## 2023-02-03 ENCOUNTER — PATIENT OUTREACH (OUTPATIENT)
Dept: CASE MANAGEMENT | Age: 88
End: 2023-02-03

## 2023-02-03 VITALS
OXYGEN SATURATION: 97 % | DIASTOLIC BLOOD PRESSURE: 76 MMHG | SYSTOLIC BLOOD PRESSURE: 132 MMHG | HEIGHT: 59 IN | HEART RATE: 69 BPM | WEIGHT: 137 LBS | RESPIRATION RATE: 16 BRPM | BODY MASS INDEX: 27.62 KG/M2 | TEMPERATURE: 97.7 F

## 2023-02-03 DIAGNOSIS — I48.91 ATRIAL FIBRILLATION, UNSPECIFIED TYPE (HCC): ICD-10-CM

## 2023-02-03 DIAGNOSIS — R60.0 LEG EDEMA, RIGHT: ICD-10-CM

## 2023-02-03 DIAGNOSIS — Z87.19 HISTORY OF GI BLEED: ICD-10-CM

## 2023-02-03 DIAGNOSIS — Z09 HOSPITAL DISCHARGE FOLLOW-UP: Primary | ICD-10-CM

## 2023-02-03 RX ORDER — SITAGLIPTIN 50 MG/1
50 TABLET, FILM COATED ORAL DAILY
COMMUNITY
Start: 2023-01-19

## 2023-02-03 RX ORDER — CARVEDILOL 6.25 MG/1
18.75 TABLET ORAL 2 TIMES DAILY
Qty: 180 TABLET | Refills: 1 | Status: SHIPPED | OUTPATIENT
Start: 2023-02-03

## 2023-02-03 RX ORDER — AMIODARONE HYDROCHLORIDE 100 MG/1
100 TABLET ORAL DAILY
Qty: 30 TABLET | Refills: 0 | Status: SHIPPED | OUTPATIENT
Start: 2023-02-03

## 2023-02-03 NOTE — PROGRESS NOTES
1. Have you been to the ER, urgent care clinic since your last visit? Hospitalized since your last visit? Yes When: 1/2/2023-19/2023 Where: [facility] Reason for visit: GI bleed    2. Have you seen or consulted any other health care providers outside of the [address] [place] [address] since your last visit? Include any pap smears or colon screening. No    Chief Complaint   Patient presents with    Hospital Follow Up           [address] Maintenance Due   Topic Date Due    Shingles Vaccine (1 of 2) Never done    DTaP/Tdap/Td series (1 - Tdap) Never done    Bone Densitometry (Dexa) Screening  Never done    COVID-19 Vaccine (3 - Booster for Moderna series) 05/01/2021    Medicare Yearly Exam  10/08/2021     3 most recent PHQ Screens 2/3/2023   Little interest or pleasure in doing things Not at all   Feeling down, depressed, irritable, or hopeless Not at all   Total Score PHQ 2 0     Abuse Screening Questionnaire 2/3/2023   Do you ever feel afraid of your partner? N   Are you in a relationship with someone who physically or mentally threatens you? N   Is it safe for you to go home? Y     Fall Risk Assessment, last 12 mths 2/3/2023   Able to walk? Yes   Fall in past 12 months? 1   Do you feel unsteady? 0   Are you worried about falling 0   Is TUG test greater than 12 seconds? 0   Is the gait abnormal? 0   Number of falls in past 12 months 1   Fall with injury?  1     Visit Vitals  /76 (BP 1 Location: Left upper arm, BP Patient Position: Sitting, BP Cuff Size: Small adult)   Pulse 69   Temp 97.7 °F (36.5 °C) (Skin)   Resp 16   Ht 4' 11\" (1.499 m)   Wt 137 lb (62.1 kg)   SpO2 97%   BMI 27.67 kg/m²

## 2023-02-03 NOTE — PROGRESS NOTES
Assessment/Plan:     Diagnoses and all orders for this visit:    1. Hospital discharge follow-up  -     METABOLIC PANEL, COMPREHENSIVE; Future  -     CBC W/O DIFF; Future  -     MAGNESIUM; Future  -Hospitalized from 1/3 through 1/9 due to GI bleed  -Eliquis and aspirin discontinued  -Recommend follow-up with cardiology to further discuss atrial fibrillation  -Keep follow-up with gastroenterology on 2/23  -Repeat lab work including repeat CBC to monitor hemoglobin  -Medications reviewed and reconciled    2. History of GI bleed  -Previously noted GI bleed during hospitalization earlier this month  -Likely secondary to Eliquis use which has been discontinued  -Continue PPI  -Recommend follow-up with gastroenterology on 2/23    3. Leg edema, right  -Per daughter patient first noted lower extremity edema, worse on the right compared to the left about 2 days after hospital discharge  -Right lower extremity edema more notable than left  -Given history of being bedbound during recent hospitalization and now being off of her Eliquis recommend emergent evaluation for possible DVT  -Patient advised to go directly to the emergency room for evaluation for possible DVT  -Patient plans to go to [facility] ED in [facility]    4. Atrial fibrillation, unspecified type (HCC)  -     METABOLIC PANEL, COMPREHENSIVE; Future  -     CBC W/O DIFF; Future  -     MAGNESIUM; Future  -     carvediloL (COREG) 6.25 mg tablet; Take 3 Tablets by mouth two (2) times a day. -     amiodarone (PACERONE) 100 mg tablet; Take 1 Tablet by mouth daily.  -     TSH 3RD GENERATION;  Future  -Chronic.  -Eliquis was discontinued due to bleeding risk especially in the setting of recent GI bleed  -Reviewed stroke vs bleeding risk   -Patient currently rate controlled with Coreg and is also on amiodarone for rhythm control  -Recommend follow-up with cardiology for additional management recommendations          Discussed expected course/resolution/complications of diagnosis in detail with patient. Medication risks/benefits/costs/interactions/alternatives discussed with patient. Pt expressed understanding with the diagnosis and plan    Subjective:      Mica Echeverria is a 80 y.o. female who presents for had concerns including Hospital Follow Up (/), Melena, and Medication Problem (Would like to discuss medication d/c). Current Outpatient Medications   Medication Sig Dispense Refill    Januvia 50 mg tablet Take 50 mg by mouth daily. carvediloL (COREG) 6.25 mg tablet Take 3 Tablets by mouth two (2) times a day. 180 Tablet 1    amiodarone (PACERONE) 100 mg tablet Take 1 Tablet by mouth daily. 30 Tablet 0    amLODIPine (NORVASC) 5 mg tablet TAKE ONE (1) TABLET BY MOUTH TWICE DAILY 180 Tablet 1    ferrous sulfate (IRON) 325 mg (65 mg iron) EC tablet TAKE 1 TABLET TWICE DAILY 180 Tab 0    sodium bicarbonate 325 mg tablet Take 325 mg by mouth two (2) times a day. hydrALAZINE (APRESOLINE) 25 mg tablet Take 1 Tab by mouth two (2) times a day. pantoprazole (PROTONIX) 40 mg tablet Take 1 Tablet by mouth every twelve (12) hours.  60 Tablet 0       No Known Allergies  Past Medical History:   Diagnosis Date    CHF (congestive heart failure) (HCC)     Diabetes ([facility] [address]     Hypertension     Kidney disease      Past Surgical History:   Procedure Laterality Date    COLONOSCOPY N/A 1/6/2023    COLONOSCOPY performed by Mackenzie Skaggs MD at [facility] ENDOSCOPY    HX BREAST BIOPSY       Family History   Problem Relation Age of Onset    Cancer Mother     Heart Disease Mother      Social History     Socioeconomic History    Marital status:      Spouse name: Not on file    Number of children: Not on file    Years of education: Not on file    Highest education level: Not on file   Occupational History    Not on file   Tobacco Use    Smoking status: Never    Smokeless tobacco: Never   Substance and Sexual Activity    Alcohol use: No    Drug use: No    Sexual activity: Not Currently Other Topics Concern    Not on file   Social History Narrative    Not on file     Social Determinants of Health     Financial Resource Strain: Not on file   Food Insecurity: Not on file   Transportation Needs: Not on file   Physical Activity: Not on file   Stress: Not on file   Social Connections: Not on file   Intimate Partner Violence: Not on file   Housing Stability: Not on file       Patient is an 80-year-old female who presents to the office today for hospital follow-up. Patient was recently hospitalized from 1/3 through 1/9 due to a GI bleed. Patient was noting blood in the stool and evaluated by gastroenterology. She had a CT abdomen that showed no acute findings, a GI bleed scan that did note active bleeding in the region of the cecum however colonoscopy only noted diverticulosis of the large intestine and some hemorrhoids. Patient was given 2 units of PRBCs during her hospitalization due to a hemoglobin as low as 5.7. Upon discharge her hemoglobin had significantly improved. Her aspirin and Eliquis were discontinued. Patient denies any further GI bleeding or abnormal bleeding noted. Patient did have several medication changes during her hospital stay including changes to her blood pressure regimen. Her blood pressure is now well controlled. Also her glyburide was discontinued and switched to Januvia 50 mg daily. It was noted to have an adjustment as well as her amiodarone and she was placed on carvedilol as well. She does have a history of atrial fibrillation however her Eliquis was discontinued due to GI bleed. She does have a cardiologist.  She also has an appointment scheduled with gastroenterology to follow-up on the GI bleed later this month. Finally of note patient does have bilateral lower extremity edema however much more significant on the right compared to the left. Per daughter this started about 2 days after her hospital course.  She has noted some improvement however it still persists. She denies any erythema or increased warmth or calf pain. ROS:   Review of Systems   Constitutional:  Negative for chills and fever. HENT:  Negative for congestion. Respiratory:  Negative for cough and shortness of breath. Cardiovascular:  Positive for leg swelling. Negative for chest pain. Gastrointestinal:  Negative for abdominal pain, nausea and vomiting. Neurological:  Negative for dizziness, tingling, weakness and headaches. Objective:   Visit Vitals  /76 (BP 1 Location: Left upper arm, BP Patient Position: Sitting, BP Cuff Size: Small adult)   Pulse 69   Temp 97.7 °F (36.5 °C) (Skin)   Resp 16   Ht 4' 11\" (1.499 m)   Wt 137 lb (62.1 kg)   SpO2 97%   BMI 27.67 kg/m²         Vitals and Nurse Documentation reviewed. Physical Exam  Constitutional:       General: She is not in acute distress. Appearance: Normal appearance. She is not toxic-appearing. HENT:      Head: Normocephalic and atraumatic. Eyes:      Conjunctiva/sclera: Conjunctivae normal.   Cardiovascular:      Rate and Rhythm: Normal rate. Rhythm irregularly irregular. Pulses: Normal pulses. Heart sounds: Normal heart sounds. No murmur heard. No gallop. Pulmonary:      Effort: Pulmonary effort is normal. No respiratory distress. Breath sounds: Normal breath sounds. No wheezing or rhonchi. Abdominal:      General: Bowel sounds are normal. There is no distension. Palpations: Abdomen is soft. Tenderness: There is no abdominal tenderness. There is no guarding. Musculoskeletal:      Cervical back: Neck supple. Right lower leg: Edema present. Left lower leg: Edema present. Comments: Right lower extremity below the knee notes more significant edema compared to the left.  Calf circumference noted to be 32 cm on the right and 29 cm on the left; pitting edema noted; no calf tenderness or increased warmth or erythema   Neurological:      Mental Status: She is alert and oriented to person, place, and time.       Gait: Gait normal.

## 2023-02-03 NOTE — PROGRESS NOTES
Care Transitions Follow Up Call    Challenges to be reviewed by the provider   Additional needs identified to be addressed with provider: yes  Leg pain, lack of tasete           Method of communication with provider : none    Care Transition Nurse (CTN) contacted the family by telephone to follow up after admission on 1/3/2023. Verified name and  with family as identifiers. Addressed changes since last contact:  pt is currently attending PCP appt  Follow up appointment completed? yes. Was follow up appointment scheduled within 7 days of discharge? no.       [facility] follow up appointment(s):   Future Appointments   Date Time Provider Loni Hamilton   2023  1:40 PM Melvi Putnam PsyD NEUROWTC BS [facility]     Non-[facility] follow up appointment(s): GI 2023    CTN provided contact information for future needs. Plan for follow-up call in 3-5 days based on severity of symptoms and risk factors. Plan for next call:  review PCP follow up       Goals Addressed                   This Visit's Progress     Prevent complications post hospitalization. 1/10/2023  Performed medication reconciliation with pt. Pt reports that she has not picked up medication prescribed by hospital and uses pill bottles for maintenance medications. Pt states that she is aware to stop a number of medications. Pt confirmed that she is at her daughter's home for a few days. Pt confirmed follow up appts and reports that she does have a ride to [employer]. We reviewed red flags on discharge paperwork and pt states understanding to call 911 for a medical emergency. Pt reports that her daughter checks on her every day and that she has neighbors checking on her as well. Pt states understanding to check for blood in stool. Pt is agreeable to a call once a week to review medications and diet and to follow up on her PCP appt on 2023.   Jose Galaraz RN [address] [facility], Care Transitions Nurse, [phone]     2023  Pt reports that PCP moved the follow up appt to 2/3/2023. Reviewed that pt has a follow up with GI on 1/24/2023 and pt asked that I call her daughter Ofelia Hogan to let her know. VM left on that telephone stating my name, CTN with [employer], date and time of call, and the appt for 1/24/2023. Pt reports that she does have medication for her stomach and is doing fine. Pt reports that she is back home and reports that she is hobbling around. Pt reports that she sprained her ankle prior to hospital visit and is in a brace. Pt reports that she is taking tylenol for pain and it is helping. Pt reminded to be careful when ambulating. Pt reports that she does not have to use a cane or a walker. Pt reports that her daughter checks on her every day. Pt is agreeable to a call on 1/23/2023 to remind her of follow up appt with GI doctor. Kostas Rodriguez RN [address] [facility], Care Transitions Nurse, [phone]     1/20/2023  Called pt's daughter Teo Hernandez and MARIA concerning follow up with GI on 1/24/2023. Left voicemail stating my name, CTN with [employer], date and time of call, and return telephone number. Kostas Rodriguez RN [address] [facility], Care Transitions Nurse, [phone]     1/23/2023  Pt reports that her leg and foot are swollen on the leg with the sprained ankle. Advised pt to try to elevate as much as possible and that she could try ice, max 20 min on but to try to elevate as much as possible. Confirmed with pt that she has a GI appt on 1/24/2023 and she reports that her daughter is aware. Pt reports that she is doing okay except for the leg. Pt is agreeable to a call on Thursday to follow up on GI appt and to check on leg. Will also begin reviewing other healthcare needs. Kostas Rodriguez RN [address] [facility], Care Transitions Nurse, [phone]     1/25/2023  Pt reports that she is on the phone with a [employer] nurse who is reaching out to PCP. Pt reports that ankle remains swollen.   Pt states that her daughter is working on an appt with GI doctor. Pt is agreeable to a call tomorrow to follow up. I asked pt if she had a telephone number for [facility] [employer] [facility] nurse and she reports that nurse is on the other line. Citlalli Hernandez RN [address] [facility], Care Transitions Nurse, [phone]     1/26/2023  Unable to LVM on pt's telephones. Left voicemail stating my name, CTN with [facility], date and time of call, and return telephone number on emergency contact's telephone. Citlalli Hernandez RN [address] [facility], Care Transitions Nurse, [phone]     1/27/2023  Spoke with pt's daughter Adam Newton who reports that pt states leg is improving. Pt's daughter reports that she encouraged the pt to get the leg looked at (same leg as ankle sprain) and pt declined to do so. Explained to pt's daughter that it would be a good idea to have the leg checked out to rule out a serious issue such a DVT and pt's daughter reports that she will encourage this. Pt's daughter confirmed follow up with PCP on 2/3/2023 and reports GI follow up on 2/23/203. Will call next week to follow up on pt and leg swelling. Citlalli Hernandez RN [address] [facility], Care Transitions Nurse, [phone]     2/2/2023  Pt reports that she is doing okay and reports that there is swelling on R leg up to knee. Pt confirms that this is the ankle that she injured prior to the hospital.  Pt reports that swelling is no worse and reports that she does use compression stockings as she has neuropathy in that leg. Pt reports that she does elevate legs when seated. Pt reports no pain and states that it feels tight. Pt reports that it is swollen some days, worse than others. Pt reports that one concern is lack of taste. Pt reports that she is challenged to eat and states that she can taste sweet, sour and peppery but not much else and makes it difficult for her to figure out what to eat. Pt states that she needs to eat to take medications and reports that she has food.   Pt is agreeable to a call tomorrow afternoon after PCP appt to follow up on leg and appetite. Cecilia Ford RN [address] [facility], Care Transitions Nurse, [phone]       2/3/2023  Left voicemail stating my name, CTN with [address], date and time of call, and return telephone number. On pt's telephone. Called pt's daughter who reports that they are currently at the PCP office and it has been recommended that pt have an US of her leg. Will follow up with pt is 5 days.   Cecilia Ford RN [address] [facility], Care Transitions Nurse, [phone]

## 2023-02-04 LAB
ALBUMIN SERPL-MCNC: 3.7 G/DL (ref 3.5–5)
ALBUMIN/GLOB SERPL: 1 (ref 1.1–2.2)
ALP SERPL-CCNC: 121 U/L (ref 45–117)
ALT SERPL-CCNC: 20 U/L (ref 12–78)
ANION GAP SERPL CALC-SCNC: 3 MMOL/L (ref 5–15)
AST SERPL-CCNC: 18 U/L (ref 15–37)
BILIRUB SERPL-MCNC: 0.4 MG/DL (ref 0.2–1)
BUN SERPL-MCNC: 27 MG/DL (ref 6–20)
BUN/CREAT SERPL: 17 (ref 12–20)
CALCIUM SERPL-MCNC: 9.5 MG/DL (ref 8.5–10.1)
CHLORIDE SERPL-SCNC: 103 MMOL/L (ref 97–108)
CO2 SERPL-SCNC: 33 MMOL/L (ref 21–32)
CREAT SERPL-MCNC: 1.57 MG/DL (ref 0.55–1.02)
ERYTHROCYTE [DISTWIDTH] IN BLOOD BY AUTOMATED COUNT: 14.6 % (ref 11.5–14.5)
GLOBULIN SER CALC-MCNC: 3.7 G/DL (ref 2–4)
GLUCOSE SERPL-MCNC: 153 MG/DL (ref 65–100)
HCT VFR BLD AUTO: 38.3 % (ref 35–47)
HGB BLD-MCNC: 11.6 G/DL (ref 11.5–16)
MAGNESIUM SERPL-MCNC: 2.4 MG/DL (ref 1.6–2.4)
MCH RBC QN AUTO: 25.5 PG (ref 26–34)
MCHC RBC AUTO-ENTMCNC: 30.3 G/DL (ref 30–36.5)
MCV RBC AUTO: 84.2 FL (ref 80–99)
NRBC # BLD: 0 K/UL (ref 0–0.01)
NRBC BLD-RTO: 0 PER 100 WBC
PLATELET # BLD AUTO: 206 K/UL (ref 150–400)
PMV BLD AUTO: 9.7 FL (ref 8.9–12.9)
POTASSIUM SERPL-SCNC: 3.8 MMOL/L (ref 3.5–5.1)
PROT SERPL-MCNC: 7.4 G/DL (ref 6.4–8.2)
RBC # BLD AUTO: 4.55 M/UL (ref 3.8–5.2)
SODIUM SERPL-SCNC: 139 MMOL/L (ref 136–145)
TSH SERPL DL<=0.05 MIU/L-ACNC: 1.16 UIU/ML (ref 0.36–3.74)
WBC # BLD AUTO: 6.9 K/UL (ref 3.6–11)

## 2023-02-06 ENCOUNTER — PATIENT OUTREACH (OUTPATIENT)
Dept: CASE MANAGEMENT | Age: 88
End: 2023-02-06

## 2023-02-06 NOTE — PROGRESS NOTES
Care Transitions Outreach Attempt      Attempted to reach patient for transitions of care follow up. Unable to reach patient. Left voicemail stating my name, CTN with [employer], date and time of call, and return telephone number. Patient: Layne Wagoner Patient : 1935 MRN: [id number]    Last Discharge  [address]       Date Complaint Diagnosis Description Type Department Provider    1/3/23 Rectal Bleeding Anemia, unspecified type . .. ED to Hosp-Admission (Discharged) (ADMIT) Maxine Larkin MD; Aurea Tom. ..           Noted following upcoming appointments from discharge chart review:   [facility] follow up appointment(s):   Future Appointments   Date Time Provider Loni Hamilton   2023  1:40 PM Melvi Putnam PsyD NEUROWTC BS [facility]     Non-[facility] follow up appointment(s): na

## 2023-02-10 ENCOUNTER — PATIENT OUTREACH (OUTPATIENT)
Dept: CASE MANAGEMENT | Age: 88
End: 2023-02-10

## 2023-02-10 NOTE — PROGRESS NOTES
Patient has graduated from the Transitions of Care Coordination  program on 2/10/23. Patient/family has the ability to self-manage at this time Care management goals have been completed. Patient was not referred to the [facility] team for further management. Goals Addressed                   This Visit's Progress     COMPLETED: Prevent complications post hospitalization. 1/10/2023  Performed medication reconciliation with pt. Pt reports that she has not picked up medication prescribed by hospital and uses pill bottles for maintenance medications. Pt states that she is aware to stop a number of medications. Pt confirmed that she is at her daughter's home for a few days. Pt confirmed follow up appts and reports that she does have a ride to [email or web address]. We reviewed red flags on discharge paperwork and pt states understanding to call 911 for a medical emergency. Pt reports that her daughter checks on her every day and that she has neighbors checking on her as well. Pt states understanding to check for blood in stool. Pt is agreeable to a call once a week to review medications and diet and to follow up on her PCP appt on 1/16/2023. Patrice Dolan RN [address] [facility], Care Transitions Nurse, [phone]     1/17/2023  Pt reports that PCP moved the follow up appt to 2/3/2023. Reviewed that pt has a follow up with GI on 1/24/2023 and pt asked that I call her daughter Irene Haywood to let her know. VM left on that telephone stating my name, CTN with [employer], date and time of call, and the appt for 1/24/2023. Pt reports that she does have medication for her stomach and is doing fine. Pt reports that she is back home and reports that she is hobbling around. Pt reports that she sprained her ankle prior to hospital visit and is in a brace. Pt reports that she is taking tylenol for pain and it is helping. Pt reminded to be careful when ambulating. Pt reports that she does not have to use a cane or a walker.   Pt reports that her daughter checks on her every day. Pt is agreeable to a call on 1/23/2023 to remind her of follow up appt with GI doctor. Freddie Schuster RN [address] [facility], Care Transitions Nurse, [phone]     1/20/2023  Called pt's daughter Iggy Ramey and LVM concerning follow up with GI on 1/24/2023. Left voicemail stating my name, CTN with [employer], date and time of call, and return telephone number. Freddie Schuster RN [address] [facility], Care Transitions Nurse, [phone]     1/23/2023  Pt reports that her leg and foot are swollen on the leg with the sprained ankle. Advised pt to try to elevate as much as possible and that she could try ice, max 20 min on but to try to elevate as much as possible. Confirmed with pt that she has a GI appt on 1/24/2023 and she reports that her daughter is aware. Pt reports that she is doing okay except for the leg. Pt is agreeable to a call on Thursday to follow up on GI appt and to check on leg. Will also begin reviewing other healthcare needs. Freddie Schuster RN [address] [facility] Care Transitions Nurse, [phone]     1/25/2023  Pt reports that she is on the phone with a [employer] nurse who is reaching out to PCP. Pt reports that ankle remains swollen. Pt states that her daughter is working on an appt with GI doctor. Pt is agreeable to a call tomorrow to follow up. I asked pt if she had a telephone number for [facility] nurse and she reports that nurse is on the other line. Freddie Schuster RN [address] [facility], Care Transitions Nurse, [phone]     1/26/2023  Unable to LVM on pt's telephones. Left voicemail stating my name, CTN with [employer], date and time of call, and return telephone number on emergency contact's telephone. Freddie Schuster RN [address] [facility], Care Transitions Nurse, [phone]     1/27/2023  Spoke with pt's daughter Elizabeth Castellano who reports that pt states leg is improving.   Pt's daughter reports that she encouraged the pt to get the leg looked at (same leg as ankle sprain) and pt declined to do so. Explained to pt's daughter that it would be a good idea to have the leg checked out to rule out a serious issue such a DVT and pt's daughter reports that she will encourage this. Pt's daughter confirmed follow up with PCP on 2/3/2023 and reports GI follow up on 2/23/203. Will call next week to follow up on pt and leg swelling. Veronika Turner RN [address] [facility] Care Transitions Nurse, [phone]     2/2/2023  Pt reports that she is doing okay and reports that there is swelling on R leg up to knee. Pt confirms that this is the ankle that she injured prior to the hospital.  Pt reports that swelling is no worse and reports that she does use compression stockings as she has neuropathy in that leg. Pt reports that she does elevate legs when seated. Pt reports no pain and states that it feels tight. Pt reports that it is swollen some days, worse than others. Pt reports that one concern is lack of taste. Pt reports that she is challenged to eat and states that she can taste sweet, sour and peppery but not much else and makes it difficult for her to figure out what to eat. Pt states that she needs to eat to take medications and reports that she has food. Pt is agreeable to a call tomorrow afternoon after PCP appt to follow up on leg and appetite. Veronika Turner RN [address] [facility] Care Transitions Nurse, [phone]       2/3/2023  Left voicemail stating my name, CTN with [employer], date and time of call, and return telephone number. On pt's telephone. Called pt's daughter who reports that they are currently at the PCP office and it has been recommended that pt have an US of her leg. Will follow up with pt is 5 days. Veronika Turner RN [address] [facility] Care Transitions Nurse, [phone]     2/6/2022  Left voicemail stating my name, CTN with [employer], date and time of call, and return telephone number.  Veronika Turner RN [address] [facility] Care Transitions Nurse, [phone]     02/10/23   Care Transitions Outreach Attempt-LMTCB. Daphne Hood MSN, RN, CCM / Care Transition Nurse / [phone]                Patient has Care Transition Nurse's contact information for any further questions, concerns, or needs.   Patients upcoming visits:    Future Appointments   Date Time Provider Loni Hamilton   8/18/2023  1:40 PM Melvi Putnam PsyD NEUROWTC BS [facility]

## 2023-02-13 ENCOUNTER — TELEPHONE (OUTPATIENT)
Dept: FAMILY MEDICINE CLINIC | Age: 88
End: 2023-02-13

## 2023-02-13 NOTE — TELEPHONE ENCOUNTER
Pt's daughter would like to know if pt should continue with [place] [facility] [place] or go back to glyburide. She need someone to advise call after [phone].  Pt last seen Dr Annel Yepez

## 2023-02-15 NOTE — TELEPHONE ENCOUNTER
Left message on vm to call office back on Daughter phone. Unable to reach patient by phone due to VM full.

## 2023-02-27 DIAGNOSIS — N18.32 STAGE 3B CHRONIC KIDNEY DISEASE (HCC): ICD-10-CM

## 2023-02-27 DIAGNOSIS — N18.4 CKD (CHRONIC KIDNEY DISEASE) STAGE 4, GFR 15-29 ML/MIN (HCC): Primary | ICD-10-CM

## 2023-04-14 DIAGNOSIS — I48.91 ATRIAL FIBRILLATION, UNSPECIFIED TYPE (HCC): ICD-10-CM

## 2023-04-14 RX ORDER — AMIODARONE HYDROCHLORIDE 100 MG/1
100 TABLET ORAL DAILY
Qty: 30 TABLET | Refills: 0 | Status: CANCELLED | OUTPATIENT
Start: 2023-04-14

## 2023-05-10 ENCOUNTER — TELEPHONE (OUTPATIENT)
Facility: CLINIC | Age: 88
End: 2023-05-10

## 2023-05-12 RX ORDER — CALCIUM CITRATE/VITAMIN D3 200MG-6.25
1 TABLET ORAL 2 TIMES DAILY
COMMUNITY
End: 2023-05-15 | Stop reason: SDUPTHER

## 2023-05-15 RX ORDER — CALCIUM CITRATE/VITAMIN D3 200MG-6.25
TABLET ORAL
Qty: 100 EACH | Refills: 2 | Status: SHIPPED | OUTPATIENT
Start: 2023-05-15

## 2023-05-19 RX ORDER — CALCIUM CITRATE/VITAMIN D3 200MG-6.25
TABLET ORAL
Qty: 100 EACH | Refills: 2 | OUTPATIENT
Start: 2023-05-19

## 2023-07-24 RX ORDER — CARVEDILOL 6.25 MG/1
TABLET ORAL
Qty: 180 TABLET | Refills: 1 | Status: SHIPPED | OUTPATIENT
Start: 2023-07-24

## 2023-07-24 NOTE — TELEPHONE ENCOUNTER
Jill Hsu MD  Medication filled on 4/10/2023 by Carrington Phipps MD  Last seen on 2/3/2023  Office Visit on 02/03/2023   Component Date Value Ref Range Status    Magnesium 02/03/2023 2.4  1.6 - 2.4 mg/dL Final    WBC 02/03/2023 6.9  3.6 - 11.0 K/uL Final    RBC 02/03/2023 4.55  3.80 - 5.20 M/uL Final    Hemoglobin 02/03/2023 11.6  11.5 - 16.0 g/dL Final    Hematocrit 02/03/2023 38.3  35.0 - 47.0 % Final    MCV 02/03/2023 84.2  80.0 - 99.0 FL Final    MCH 02/03/2023 25.5 (L)  26.0 - 34.0 PG Final    MCHC 02/03/2023 30.3  30.0 - 36.5 g/dL Final    RDW 02/03/2023 14.6 (H)  11.5 - 14.5 % Final    Platelets 68/69/4794 206  150 - 400 K/uL Final    MPV 02/03/2023 9.7  8.9 - 12.9 FL Final    Nucleated RBCs 02/03/2023 0.0  0  WBC Final    NRBC Absolute 02/03/2023 0.00  0.00 - 0.01 K/uL Final    TSH 02/03/2023 1.16  0.36 - 3.74 uIU/mL Final    Comment:   Due to TSH heterogeneity, both structurally and degree of glycosylation, monoclonal antibodies used in the TSH assay may not accurately quantitate TSH. Therefore, this result should be correlated with clinical findings as well as with other assessments of thyroid function, e.g., free T4, free T3. Sodium 02/03/2023 139  136 - 145 mmol/L Final    Potassium 02/03/2023 3.8  3.5 - 5.1 mmol/L Final    Chloride 02/03/2023 103  97 - 108 mmol/L Final    CO2 02/03/2023 33 (H)  21 - 32 mmol/L Final    Anion Gap 02/03/2023 3 (L)  5 - 15 mmol/L Final    Glucose 02/03/2023 153 (H)  65 - 100 mg/dL Final    BUN 02/03/2023 27 (H)  6 - 20 MG/DL Final    Creatinine 02/03/2023 1.57 (H)  0.55 - 1.02 MG/DL Final    Bun/Cre Ratio 02/03/2023 17  12 - 20   Final    ESTIMATED GLOMERULAR FILTRATION RA* 02/03/2023 32 (L)  >60 ml/min/1.73m2 Final    Comment:   Pediatric calculator link: CarCharanjitow.at. org/professionals/kdoqi/gfr_calculatorped    These results are not intended for use in patients <25years of age.     eGFR results are calculated without a race factor using  the 2021

## 2023-12-04 RX ORDER — SITAGLIPTIN 50 MG/1
50 TABLET, FILM COATED ORAL DAILY
Qty: 30 TABLET | Refills: 0 | Status: SHIPPED | OUTPATIENT
Start: 2023-12-04

## 2023-12-04 NOTE — TELEPHONE ENCOUNTER
PCP: Viri Nicolas MD    Last appt: [unfilled]  No future appointments.    Requested Prescriptions     Pending Prescriptions Disp Refills    JANUVIA 50 MG tablet [Pharmacy Med Name: JANUVIA 50MG TABLET] 90 tablet 1     Sig: TAKE ONE (1) TABLET BY MOUTH DAILY.       Prior labs and Blood pressures:  BP Readings from Last 3 Encounters:   02/03/23 132/76   10/06/22 (!) 156/87   09/30/21 (!) 162/76     Lab Results   Component Value Date/Time     02/03/2023 03:17 PM    K 3.8 02/03/2023 03:17 PM     02/03/2023 03:17 PM    CO2 33 02/03/2023 03:17 PM    BUN 27 02/03/2023 03:17 PM    GFRAA 43 09/30/2021 03:35 PM     No results found for: \"HBA1C\", \"UMR1WMMJ\"  Lab Results   Component Value Date/Time    CHOL 181 11/05/2020 12:48 PM    HDL 66 11/05/2020 12:48 PM    VLDL 19 11/05/2020 12:48 PM     No results found for: \"VITD3\", \"VD3RIA\"    Lab Results   Component Value Date/Time    TSH 1.16 02/03/2023 03:17 PM

## 2024-01-22 NOTE — TELEPHONE ENCOUNTER
PCP: Viri Nicolas MD    Last appt: [unfilled]  No future appointments.    Requested Prescriptions     Pending Prescriptions Disp Refills    carvedilol (COREG) 6.25 MG tablet [Pharmacy Med Name: CARVEDILOL 6.25MG TABLET] 180 tablet 1     Sig: TAKE THREE (3) TABLETS BY MOUTH TWO (2) TIMES A DAY.       Prior labs and Blood pressures:  BP Readings from Last 3 Encounters:   02/03/23 132/76   10/06/22 (!) 156/87   09/30/21 (!) 162/76     Lab Results   Component Value Date/Time     02/03/2023 03:17 PM    K 3.8 02/03/2023 03:17 PM     02/03/2023 03:17 PM    CO2 33 02/03/2023 03:17 PM    BUN 27 02/03/2023 03:17 PM    GFRAA 43 09/30/2021 03:35 PM     No results found for: \"HBA1C\", \"[id number]\"  Lab Results   Component Value Date/Time    CHOL 181 11/05/2020 12:48 PM    HDL 66 11/05/2020 12:48 PM    VLDL 19 11/05/2020 12:48 PM     No results found for: \"VITD3\", \"VD3RIA\"    Lab Results   Component Value Date/Time    TSH 1.16 02/03/2023 03:17 PM

## 2024-01-23 RX ORDER — CARVEDILOL 6.25 MG/1
TABLET ORAL
Qty: 180 TABLET | Refills: 1 | OUTPATIENT
Start: 2024-01-23

## 2024-09-04 ENCOUNTER — APPOINTMENT (OUTPATIENT)
Facility: HOSPITAL | Age: 89
End: 2024-09-04
Payer: MEDICARE

## 2024-09-04 ENCOUNTER — HOSPITAL ENCOUNTER (INPATIENT)
Facility: HOSPITAL | Age: 89
LOS: 2 days | Discharge: HOME HEALTH | End: 2024-09-06
Attending: STUDENT IN AN ORGANIZED HEALTH CARE EDUCATION/TRAINING PROGRAM | Admitting: INTERNAL MEDICINE
Payer: MEDICARE

## 2024-09-04 DIAGNOSIS — K92.2 LOWER GI BLEED: Primary | ICD-10-CM

## 2024-09-04 PROBLEM — K62.5 RECTAL BLEEDING: Status: ACTIVE | Noted: 2024-09-04

## 2024-09-04 LAB
ABO + RH BLD: NORMAL
ALBUMIN SERPL-MCNC: 2.9 G/DL (ref 3.5–5)
ALBUMIN/GLOB SERPL: 1 (ref 1.1–2.2)
ALP SERPL-CCNC: 67 U/L (ref 45–117)
ALT SERPL-CCNC: 15 U/L (ref 12–78)
ANION GAP SERPL CALC-SCNC: 8 MMOL/L (ref 5–15)
AST SERPL W P-5'-P-CCNC: 13 U/L (ref 15–37)
BASOPHILS # BLD: 0.1 K/UL (ref 0–0.1)
BASOPHILS NFR BLD: 1 % (ref 0–1)
BILIRUB SERPL-MCNC: 0.6 MG/DL (ref 0.2–1)
BLOOD GROUP ANTIBODIES SERPL: NEGATIVE
BUN SERPL-MCNC: 15 MG/DL (ref 6–20)
BUN/CREAT SERPL: 10 (ref 12–20)
CA-I BLD-MCNC: 9.1 MG/DL (ref 8.5–10.1)
CHLORIDE SERPL-SCNC: 108 MMOL/L (ref 97–108)
CO2 SERPL-SCNC: 29 MMOL/L (ref 21–32)
CREAT SERPL-MCNC: 1.45 MG/DL (ref 0.55–1.02)
DIFFERENTIAL METHOD BLD: ABNORMAL
EOSINOPHIL # BLD: 0.1 K/UL (ref 0–0.4)
EOSINOPHIL NFR BLD: 2 % (ref 0–7)
ERYTHROCYTE [DISTWIDTH] IN BLOOD BY AUTOMATED COUNT: 14.5 % (ref 11.5–14.5)
GLOBULIN SER CALC-MCNC: 2.9 G/DL (ref 2–4)
GLUCOSE SERPL-MCNC: 343 MG/DL (ref 65–100)
HCT VFR BLD AUTO: 31 % (ref 35–47)
HGB BLD-MCNC: 9.6 G/DL (ref 11.5–16)
IMM GRANULOCYTES # BLD AUTO: 0 K/UL (ref 0–0.04)
IMM GRANULOCYTES NFR BLD AUTO: 1 % (ref 0–0.5)
INR PPP: 1.2 (ref 0.9–1.1)
LYMPHOCYTES # BLD: 2.1 K/UL (ref 0.8–3.5)
LYMPHOCYTES NFR BLD: 32 % (ref 12–49)
MCH RBC QN AUTO: 24.7 PG (ref 26–34)
MCHC RBC AUTO-ENTMCNC: 31 G/DL (ref 30–36.5)
MCV RBC AUTO: 79.9 FL (ref 80–99)
MONOCYTES # BLD: 0.5 K/UL (ref 0–1)
MONOCYTES NFR BLD: 7 % (ref 5–13)
NEUTS SEG # BLD: 3.8 K/UL (ref 1.8–8)
NEUTS SEG NFR BLD: 57 % (ref 32–75)
NRBC # BLD: 0 K/UL (ref 0–0.01)
NRBC BLD-RTO: 0 PER 100 WBC
PLATELET # BLD AUTO: 190 K/UL (ref 150–400)
PMV BLD AUTO: 10.9 FL (ref 8.9–12.9)
POTASSIUM SERPL-SCNC: 3.4 MMOL/L (ref 3.5–5.1)
PROT SERPL-MCNC: 5.8 G/DL (ref 6.4–8.2)
PROTHROMBIN TIME: 15.3 SEC (ref 11.9–14.6)
RBC # BLD AUTO: 3.88 M/UL (ref 3.8–5.2)
SODIUM SERPL-SCNC: 145 MMOL/L (ref 136–145)
SPECIMEN EXP DATE BLD: NORMAL
TROPONIN I SERPL HS-MCNC: 32 NG/L (ref 0–51)
WBC # BLD AUTO: 6.6 K/UL (ref 3.6–11)

## 2024-09-04 PROCEDURE — 74178 CT ABD&PLV WO CNTR FLWD CNTR: CPT

## 2024-09-04 PROCEDURE — 85610 PROTHROMBIN TIME: CPT

## 2024-09-04 PROCEDURE — 86900 BLOOD TYPING SEROLOGIC ABO: CPT

## 2024-09-04 PROCEDURE — 99285 EMERGENCY DEPT VISIT HI MDM: CPT

## 2024-09-04 PROCEDURE — 96374 THER/PROPH/DIAG INJ IV PUSH: CPT

## 2024-09-04 PROCEDURE — 6360000004 HC RX CONTRAST MEDICATION: Performed by: STUDENT IN AN ORGANIZED HEALTH CARE EDUCATION/TRAINING PROGRAM

## 2024-09-04 PROCEDURE — 85018 HEMOGLOBIN: CPT

## 2024-09-04 PROCEDURE — 84484 ASSAY OF TROPONIN QUANT: CPT

## 2024-09-04 PROCEDURE — 6360000002 HC RX W HCPCS: Performed by: STUDENT IN AN ORGANIZED HEALTH CARE EDUCATION/TRAINING PROGRAM

## 2024-09-04 PROCEDURE — 36415 COLL VENOUS BLD VENIPUNCTURE: CPT

## 2024-09-04 PROCEDURE — 86901 BLOOD TYPING SEROLOGIC RH(D): CPT

## 2024-09-04 PROCEDURE — 80053 COMPREHEN METABOLIC PANEL: CPT

## 2024-09-04 PROCEDURE — 2580000003 HC RX 258: Performed by: STUDENT IN AN ORGANIZED HEALTH CARE EDUCATION/TRAINING PROGRAM

## 2024-09-04 PROCEDURE — 93005 ELECTROCARDIOGRAM TRACING: CPT | Performed by: STUDENT IN AN ORGANIZED HEALTH CARE EDUCATION/TRAINING PROGRAM

## 2024-09-04 PROCEDURE — 85025 COMPLETE CBC W/AUTO DIFF WBC: CPT

## 2024-09-04 PROCEDURE — 86850 RBC ANTIBODY SCREEN: CPT

## 2024-09-04 PROCEDURE — 85014 HEMATOCRIT: CPT

## 2024-09-04 PROCEDURE — 1100000000 HC RM PRIVATE

## 2024-09-04 PROCEDURE — 83036 HEMOGLOBIN GLYCOSYLATED A1C: CPT

## 2024-09-04 RX ORDER — SODIUM CHLORIDE 0.9 % (FLUSH) 0.9 %
5-40 SYRINGE (ML) INJECTION PRN
Status: DISCONTINUED | OUTPATIENT
Start: 2024-09-04 | End: 2024-09-06 | Stop reason: HOSPADM

## 2024-09-04 RX ORDER — AMLODIPINE BESYLATE 5 MG/1
5 TABLET ORAL 2 TIMES DAILY
Status: DISCONTINUED | OUTPATIENT
Start: 2024-09-04 | End: 2024-09-06 | Stop reason: HOSPADM

## 2024-09-04 RX ORDER — ACETAMINOPHEN 650 MG/1
650 SUPPOSITORY RECTAL EVERY 6 HOURS PRN
Status: DISCONTINUED | OUTPATIENT
Start: 2024-09-04 | End: 2024-09-06 | Stop reason: HOSPADM

## 2024-09-04 RX ORDER — ACETAMINOPHEN 325 MG/1
650 TABLET ORAL EVERY 6 HOURS PRN
Status: DISCONTINUED | OUTPATIENT
Start: 2024-09-04 | End: 2024-09-06 | Stop reason: HOSPADM

## 2024-09-04 RX ORDER — POTASSIUM CHLORIDE 1500 MG/1
20 TABLET, EXTENDED RELEASE ORAL ONCE
Status: COMPLETED | OUTPATIENT
Start: 2024-09-05 | End: 2024-09-05

## 2024-09-04 RX ORDER — HYDRALAZINE HYDROCHLORIDE 25 MG/1
25 TABLET, FILM COATED ORAL 2 TIMES DAILY
Status: DISCONTINUED | OUTPATIENT
Start: 2024-09-05 | End: 2024-09-06 | Stop reason: HOSPADM

## 2024-09-04 RX ORDER — INSULIN LISPRO 100 [IU]/ML
0-4 INJECTION, SOLUTION INTRAVENOUS; SUBCUTANEOUS NIGHTLY
Status: DISCONTINUED | OUTPATIENT
Start: 2024-09-05 | End: 2024-09-06 | Stop reason: HOSPADM

## 2024-09-04 RX ORDER — GLUCAGON 1 MG/ML
1 KIT INJECTION PRN
Status: DISCONTINUED | OUTPATIENT
Start: 2024-09-04 | End: 2024-09-06 | Stop reason: HOSPADM

## 2024-09-04 RX ORDER — POLYETHYLENE GLYCOL 3350 17 G/17G
17 POWDER, FOR SOLUTION ORAL DAILY PRN
Status: DISCONTINUED | OUTPATIENT
Start: 2024-09-04 | End: 2024-09-06 | Stop reason: HOSPADM

## 2024-09-04 RX ORDER — CARVEDILOL 3.12 MG/1
6.25 TABLET ORAL 2 TIMES DAILY WITH MEALS
Status: DISCONTINUED | OUTPATIENT
Start: 2024-09-04 | End: 2024-09-06 | Stop reason: HOSPADM

## 2024-09-04 RX ORDER — IOPAMIDOL 755 MG/ML
100 INJECTION, SOLUTION INTRAVASCULAR
Status: COMPLETED | OUTPATIENT
Start: 2024-09-04 | End: 2024-09-04

## 2024-09-04 RX ORDER — SODIUM CHLORIDE 9 MG/ML
INJECTION, SOLUTION INTRAVENOUS PRN
Status: DISCONTINUED | OUTPATIENT
Start: 2024-09-04 | End: 2024-09-06 | Stop reason: HOSPADM

## 2024-09-04 RX ORDER — DEXTROSE MONOHYDRATE 100 MG/ML
INJECTION, SOLUTION INTRAVENOUS CONTINUOUS PRN
Status: DISCONTINUED | OUTPATIENT
Start: 2024-09-04 | End: 2024-09-06 | Stop reason: HOSPADM

## 2024-09-04 RX ORDER — LOSARTAN POTASSIUM AND HYDROCHLOROTHIAZIDE 12.5; 5 MG/1; MG/1
1 TABLET ORAL DAILY
COMMUNITY

## 2024-09-04 RX ORDER — SODIUM CHLORIDE 0.9 % (FLUSH) 0.9 %
5-40 SYRINGE (ML) INJECTION EVERY 12 HOURS SCHEDULED
Status: DISCONTINUED | OUTPATIENT
Start: 2024-09-04 | End: 2024-09-06 | Stop reason: HOSPADM

## 2024-09-04 RX ORDER — AMIODARONE HYDROCHLORIDE 200 MG/1
100 TABLET ORAL DAILY
Status: DISCONTINUED | OUTPATIENT
Start: 2024-09-05 | End: 2024-09-06 | Stop reason: HOSPADM

## 2024-09-04 RX ORDER — SODIUM CHLORIDE 9 MG/ML
INJECTION, SOLUTION INTRAVENOUS CONTINUOUS
Status: DISPENSED | OUTPATIENT
Start: 2024-09-04 | End: 2024-09-05

## 2024-09-04 RX ORDER — ONDANSETRON 2 MG/ML
4 INJECTION INTRAMUSCULAR; INTRAVENOUS EVERY 6 HOURS PRN
Status: DISCONTINUED | OUTPATIENT
Start: 2024-09-04 | End: 2024-09-06 | Stop reason: HOSPADM

## 2024-09-04 RX ORDER — DONEPEZIL HYDROCHLORIDE 5 MG/1
5 TABLET, FILM COATED ORAL NIGHTLY
COMMUNITY

## 2024-09-04 RX ORDER — ONDANSETRON 4 MG/1
4 TABLET, ORALLY DISINTEGRATING ORAL EVERY 8 HOURS PRN
Status: DISCONTINUED | OUTPATIENT
Start: 2024-09-04 | End: 2024-09-06 | Stop reason: HOSPADM

## 2024-09-04 RX ORDER — INSULIN LISPRO 100 [IU]/ML
0-4 INJECTION, SOLUTION INTRAVENOUS; SUBCUTANEOUS
Status: DISCONTINUED | OUTPATIENT
Start: 2024-09-05 | End: 2024-09-06 | Stop reason: HOSPADM

## 2024-09-04 RX ADMIN — SODIUM CHLORIDE, PRESERVATIVE FREE 40 MG: 5 INJECTION INTRAVENOUS at 16:09

## 2024-09-04 RX ADMIN — IOPAMIDOL 100 ML: 755 INJECTION, SOLUTION INTRAVENOUS at 17:38

## 2024-09-04 ASSESSMENT — LIFESTYLE VARIABLES
HOW OFTEN DO YOU HAVE A DRINK CONTAINING ALCOHOL: NEVER
HOW MANY STANDARD DRINKS CONTAINING ALCOHOL DO YOU HAVE ON A TYPICAL DAY: PATIENT DOES NOT DRINK

## 2024-09-04 ASSESSMENT — PAIN - FUNCTIONAL ASSESSMENT: PAIN_FUNCTIONAL_ASSESSMENT: 0-10

## 2024-09-04 ASSESSMENT — PAIN SCALES - GENERAL: PAINLEVEL_OUTOF10: 0

## 2024-09-04 NOTE — ED PROVIDER NOTES
[facility] EMERGENCY DEPT  EMERGENCY DEPARTMENT HISTORY AND PHYSICAL EXAM      Date: 9/4/2024  Patient Name: Rebecca Jaimes  MRN: [id number]  Birthdate 1935  Date of evaluation: 9/4/2024  Provider: Collin De Leon MD   Note Started: 5:29 PM EDT 9/4/24    HISTORY OF PRESENT ILLNESS     Chief Complaint   Patient presents with    GI Bleeding    Irregular Heart Beat       History Provided By: Patient    HPI: Rebecca Jaimes is a 88 y.o. female with past medical history as reviewed below presents for evaluation of GI bleed.  Patient endorses rectal bleeding starting this morning.  She has a history of the same and has required blood transfusion in the past.  No history of intra-abdominal surgeries.  She is currently on blood thinner.  No associated abdominal pain.  PAST MEDICAL HISTORY   Past Medical History:  Past Medical History:   Diagnosis Date    CHF (congestive heart failure) (HCC)     Diabetes (HCC)     Hypertension     Kidney disease        Past Surgical History:  Past Surgical History:   Procedure Laterality Date    BREAST BIOPSY      COLONOSCOPY N/A 1/6/2023    COLONOSCOPY performed by Dawn De La Rosa MD at [facility] ENDOSCOPY       Family History:  Family History   Problem Relation Age of Onset    Heart Disease Mother     Cancer Mother        Social History:  Social History     Tobacco Use    Smoking status: Never    Smokeless tobacco: Never   Substance Use Topics    Alcohol use: No    Drug use: No       Allergies:  No Known Allergies    PCP: Mj Clay MD    Current Meds:   Current Facility-Administered Medications   Medication Dose Route Frequency Provider Last Rate Last Admin    pantoprazole (PROTONIX) 40 mg in sodium chloride (PF) 0.9 % 10 mL injection  40 mg IntraVENous Daily Collin De Leon MD   40 mg at 09/04/24 1609     Current Outpatient Medications   Medication Sig Dispense Refill    JANUVIA 50 MG tablet TAKE ONE (1) TABLET BY MOUTH DAILY. 30 tablet 0    carvedilol (COREG) 6.25 MG tablet TAKE  1.02 mg/dL    BUN/Creatinine Ratio 10 (L) 12 - 20      Est, Glom Filt Rate 35 (L) >60 ml/min/1.73m2    Calcium 9.1 8.5 - 10.1 mg/dL    Total Bilirubin 0.6 0.2 - 1.0 mg/dL    AST 13 (L) 15 - 37 U/L    ALT 15 12 - 78 U/L    Alk Phosphatase 67 45 - 117 U/L    Total Protein 5.8 (L) 6.4 - 8.2 g/dL    Albumin 2.9 (L) 3.5 - 5.0 g/dL    Globulin 2.9 2.0 - 4.0 g/dL    Albumin/Globulin Ratio 1.0 (L) 1.1 - 2.2     Protime-INR    Collection Time: 09/04/24  3:55 PM   Result Value Ref Range    Protime 15.3 (H) 11.9 - 14.6 sec    INR 1.2 (H) 0.9 - 1.1     TYPE AND SCREEN    Collection Time: 09/04/24  3:55 PM   Result Value Ref Range    Crossmatch expiration date 09/07/2024,2359     ABO/Rh O Positive     Antibody Screen Negative    Troponin    Collection Time: 09/04/24  3:55 PM   Result Value Ref Range    Troponin, High Sensitivity 32 0 - 51 ng/L       EKG: Initial EKG interpreted by me if performed. See ED course below    Radiologic Studies:  Non-plain film images such as CT, Ultrasound and MRI are read by the radiologist. Plain radiographic images are visualized and preliminarily interpreted by the ED Provider with findings available in ED course below.     Interpretation per the Radiologist below, if available at the time of this note:  CT ABDOMEN PELVIS W WO CONTRAST Additional Contrast? None   Final Result      1. No evidence for active arterial gastrointestinal hemorrhage.   2. Mild right-sided colitis.   3. Stable cystic lesion in the pancreatic tail.   4. Large hiatal hernia.   5. Mild cystitis.   6. Bibasilar atelectasis with trace pleural effusions.         Electronically signed by Fredrick Colón           EMERGENCY DEPARTMENT COURSE and DIFFERENTIAL DIAGNOSIS/MDM   CC/HPI Summary, MDM, ED Course, and Reassessment:   ED Course as of 09/04/24 1928   Wed Sep 04, 2024   1605 MDM: 88-year-old female presents for evaluation of lower GI bleed.  Physical exam benign with soft abdomen.  Differential includes lower versus upper  GI bleed versus urinary tract infection versus hematuria, additionally concern for blood loss requiring transfusion.  Will evaluate with CBC, CMP, type and screen, PT/INR, CT scan of the abdomen and pelvis, EKG.  Initial treatment with Protonix [BQ]   1607 EKG interpreted by me shows A-fib with a ventricular of 72, left axis deviation, right bundle branch block, no ST elevation or depression [BQ]   1646 Hemoglobin Quant(!): 9.6 [BQ]   1846  Call and Consult page placed to Dr. Pineda, covering for GI. [BQ]   1923 Discussed case with hospitalist who agrees with plan for admission [BQ]      ED Course User Index  [BQ] Collin De Leon MD       Records Reviewed (source and summary of external notes): Prior medical records and Nursing notes    SEPSIS Reassessment:     Clinical Management Tools:      Vitals:    Vitals:    09/04/24 1542 09/04/24 1600 09/04/24 1715 09/04/24 1923   BP: 130/66 111/64 138/89 133/60   Pulse: 92 90 98 95   Resp: 21 25 16 18   Temp:    97.8 °F (36.6 °C)   TempSrc:       SpO2: 100% 94% 99%    Weight:       Height:            Patient was given the following medications:  Medications   pantoprazole (PROTONIX) 40 mg in sodium chloride (PF) 0.9 % 10 mL injection (40 mg IntraVENous Given 9/4/24 1609)   iopamidol (ISOVUE-370) 76 % injection 100 mL (100 mLs IntraVENous Given 9/4/24 0760)       CONSULTS: (Who and What was discussed)  None     Social Determinants affecting Dx or Tx: None    PROCEDURES   Unless otherwise noted above, none  Procedures      CRITICAL CARE TIME   Patient does not meet Critical Care Time, 0 minutes    ED FINAL IMPRESSION     1. Lower GI bleed          DISPOSITION/PLAN   DISPOSITION Decision To Admit 09/04/2024 07:26:06 PM  Condition at Disposition: Good    Admit Note: Pt is being admitted by medicine. The results of their tests and reason(s) for their admission have been discussed with pt and/or available family. They convey agreement and understanding for the need to be

## 2024-09-04 NOTE — H&P
Hospitalist Admission Note    NAME:   Rebecca Jaimes   : 1935   MRN: [id number]     Date/Time: 2024 7:48 PM    Patient PCP: Mj Clay MD    ______________________________________________________________________  Given the patient's current clinical presentation, I have a high level of concern for decompensation if discharged from the emergency department.  Complex decision making was performed, which includes reviewing the patient's available past medical records, laboratory results, and x-ray films.       My assessment of this patient's clinical condition and my plan of care is as follows.    Assessment / Plan:    #Rectal bleeding  #Acute blood loss anemia  -Possible diverticular bleed in the setting of anticoagulation by Eliquis  -Serial H&H, type and screen  -Hold Eliquis  -Monitor hemodynamics  -N.p.o.  -GI has been consulted    # Possible colitis  -Findings suggestive of colitis on CT.  Denies abdominal pain.  She has no fever or leukocytosis.  Will hold antibiotics for now.  Continue to monitor    #Syncope  -Possible orthostatic syncope in the setting blood loss/anemia  -Denies lightheadedness or dizziness at this time  -Monitoring H&H, transfuse as indicated  -Telemetry monitoring    # Atrial fibrillation with RVR  -Currently rate controlled A-fib  -Continue amiodarone and carvedilol  -Telemetry monitoring  -Eliquis is held    #Mild hypokalemia  -Oral KCl  -Follow-up BMP    #CKD 3  -Renal function is near baseline    #Hypertension  -Continue amlodipine, carvedilol and hydralazine    #Dementia  -Recently started on donepezil    #DM type II  -Fingerstick monitoring, corrective insulin    Code Status: Full code  DVT Prophylaxis: SCD      Subjective:   CHIEF COMPLAINT: Rectal bleeding    HISTORY OF PRESENT ILLNESS:     Rebecca Jaimes is a 88 y.o.  female with PMHx as outlined above.  Patient presents to the emergency department with a complaint of bright red blood per rectum since this morning.   FL    Nucleated RBCs 0.0 0.0  WBC    nRBC 0.00 0.00 - 0.01 K/uL    Neutrophils % 57 32 - 75 %    Lymphocytes % 32 12 - 49 %    Monocytes % 7 5 - 13 %    Eosinophils % 2 0 - 7 %    Basophils % 1 0 - 1 %    Immature Granulocytes % 1 (H) 0 - 0.5 %    Neutrophils Absolute 3.8 1.8 - 8.0 K/UL    Lymphocytes Absolute 2.1 0.8 - 3.5 K/UL    Monocytes Absolute 0.5 0.0 - 1.0 K/UL    Eosinophils Absolute 0.1 0.0 - 0.4 K/UL    Basophils Absolute 0.1 0.0 - 0.1 K/UL    Immature Granulocytes Absolute 0.0 0.00 - 0.04 K/UL    Differential Type AUTOMATED     Comprehensive Metabolic Panel    Collection Time: 09/04/24  3:55 PM   Result Value Ref Range    Sodium 145 136 - 145 mmol/L    Potassium 3.4 (L) 3.5 - 5.1 mmol/L    Chloride 108 97 - 108 mmol/L    CO2 29 21 - 32 mmol/L    Anion Gap 8 5 - 15 mmol/L    Glucose 343 (H) 65 - 100 mg/dL    BUN 15 6 - 20 mg/dL    Creatinine 1.45 (H) 0.55 - 1.02 mg/dL    BUN/Creatinine Ratio 10 (L) 12 - 20      Est, Glom Filt Rate 35 (L) >60 ml/min/1.73m2    Calcium 9.1 8.5 - 10.1 mg/dL    Total Bilirubin 0.6 0.2 - 1.0 mg/dL    AST 13 (L) 15 - 37 U/L    ALT 15 12 - 78 U/L    Alk Phosphatase 67 45 - 117 U/L    Total Protein 5.8 (L) 6.4 - 8.2 g/dL    Albumin 2.9 (L) 3.5 - 5.0 g/dL    Globulin 2.9 2.0 - 4.0 g/dL    Albumin/Globulin Ratio 1.0 (L) 1.1 - 2.2     Protime-INR    Collection Time: 09/04/24  3:55 PM   Result Value Ref Range    Protime 15.3 (H) 11.9 - 14.6 sec    INR 1.2 (H) 0.9 - 1.1     TYPE AND SCREEN    Collection Time: 09/04/24  3:55 PM   Result Value Ref Range    Crossmatch expiration date 09/07/2024,2359     ABO/Rh O Positive     Antibody Screen Negative    Troponin    Collection Time: 09/04/24  3:55 PM   Result Value Ref Range    Troponin, High Sensitivity 32 0 - 51 ng/L         CT ABDOMEN PELVIS W WO CONTRAST Additional Contrast? None    Result Date: 9/4/2024  INDICATION: GI Bleed COMPARISON: 1/3/2023 TECHNIQUE: Thin axial images were obtained through the abdomen and pelvis with

## 2024-09-04 NOTE — ED TRIAGE NOTES
Pt arrives to ED by EMS from home. Pt came in for rectal bleeding that started at 1000 today. EMS also reported Pt in afib w/rvr. Two syncopal events as well.

## 2024-09-05 LAB
ANION GAP SERPL CALC-SCNC: 7 MMOL/L (ref 2–12)
BASOPHILS # BLD: 0 K/UL (ref 0–0.1)
BASOPHILS NFR BLD: 1 % (ref 0–1)
BUN SERPL-MCNC: 16 MG/DL (ref 6–20)
BUN/CREAT SERPL: 15 (ref 12–20)
CA-I BLD-MCNC: 8.6 MG/DL (ref 8.5–10.1)
CHLORIDE SERPL-SCNC: 110 MMOL/L (ref 97–108)
CO2 SERPL-SCNC: 27 MMOL/L (ref 21–32)
CREAT SERPL-MCNC: 1.09 MG/DL (ref 0.55–1.02)
DIFFERENTIAL METHOD BLD: ABNORMAL
EKG ATRIAL RATE: 73 BPM
EKG DIAGNOSIS: NORMAL
EKG Q-T INTERVAL: 388 MS
EKG QRS DURATION: 132 MS
EKG QTC CALCULATION (BAZETT): 424 MS
EKG R AXIS: -66 DEGREES
EKG T AXIS: -39 DEGREES
EKG VENTRICULAR RATE: 72 BPM
EOSINOPHIL # BLD: 0.1 K/UL (ref 0–0.4)
EOSINOPHIL NFR BLD: 2 % (ref 0–7)
ERYTHROCYTE [DISTWIDTH] IN BLOOD BY AUTOMATED COUNT: 14.7 % (ref 11.5–14.5)
EST. AVERAGE GLUCOSE BLD GHB EST-MCNC: 166 MG/DL
GLUCOSE BLD STRIP.AUTO-MCNC: 168 MG/DL (ref 65–100)
GLUCOSE BLD STRIP.AUTO-MCNC: 174 MG/DL (ref 65–100)
GLUCOSE BLD STRIP.AUTO-MCNC: 190 MG/DL (ref 65–100)
GLUCOSE BLD STRIP.AUTO-MCNC: 253 MG/DL (ref 65–100)
GLUCOSE SERPL-MCNC: 161 MG/DL (ref 65–100)
HBA1C MFR BLD: 7.4 % (ref 4–5.6)
HCT VFR BLD AUTO: 24 % (ref 35–47)
HCT VFR BLD AUTO: 27.2 % (ref 35–47)
HGB BLD-MCNC: 7.6 G/DL (ref 11.5–16)
HGB BLD-MCNC: 8.6 G/DL (ref 11.5–16)
IMM GRANULOCYTES # BLD AUTO: 0 K/UL (ref 0–0.04)
IMM GRANULOCYTES NFR BLD AUTO: 0 % (ref 0–0.5)
LYMPHOCYTES # BLD: 2.4 K/UL (ref 0.8–3.5)
LYMPHOCYTES NFR BLD: 40 % (ref 12–49)
MCH RBC QN AUTO: 24.7 PG (ref 26–34)
MCHC RBC AUTO-ENTMCNC: 31.7 G/DL (ref 30–36.5)
MCV RBC AUTO: 77.9 FL (ref 80–99)
MONOCYTES # BLD: 0.6 K/UL (ref 0–1)
MONOCYTES NFR BLD: 10 % (ref 5–13)
NEUTS SEG # BLD: 2.9 K/UL (ref 1.8–8)
NEUTS SEG NFR BLD: 47 % (ref 32–75)
NRBC # BLD: 0 K/UL (ref 0–0.01)
NRBC BLD-RTO: 0 PER 100 WBC
PERFORMED BY:: ABNORMAL
PLATELET # BLD AUTO: 150 K/UL (ref 150–400)
PMV BLD AUTO: 10.5 FL (ref 8.9–12.9)
POTASSIUM SERPL-SCNC: 3.6 MMOL/L (ref 3.5–5.1)
RBC # BLD AUTO: 3.08 M/UL (ref 3.8–5.2)
SODIUM SERPL-SCNC: 144 MMOL/L (ref 136–145)
WBC # BLD AUTO: 6.1 K/UL (ref 3.6–11)

## 2024-09-05 PROCEDURE — 6370000000 HC RX 637 (ALT 250 FOR IP): Performed by: INTERNAL MEDICINE

## 2024-09-05 PROCEDURE — 85025 COMPLETE CBC W/AUTO DIFF WBC: CPT

## 2024-09-05 PROCEDURE — 82962 GLUCOSE BLOOD TEST: CPT

## 2024-09-05 PROCEDURE — 80048 BASIC METABOLIC PNL TOTAL CA: CPT

## 2024-09-05 PROCEDURE — 2580000003 HC RX 258: Performed by: INTERNAL MEDICINE

## 2024-09-05 PROCEDURE — 6370000000 HC RX 637 (ALT 250 FOR IP): Performed by: STUDENT IN AN ORGANIZED HEALTH CARE EDUCATION/TRAINING PROGRAM

## 2024-09-05 PROCEDURE — 1100000000 HC RM PRIVATE

## 2024-09-05 PROCEDURE — 6360000002 HC RX W HCPCS: Performed by: STUDENT IN AN ORGANIZED HEALTH CARE EDUCATION/TRAINING PROGRAM

## 2024-09-05 PROCEDURE — 2580000003 HC RX 258: Performed by: STUDENT IN AN ORGANIZED HEALTH CARE EDUCATION/TRAINING PROGRAM

## 2024-09-05 PROCEDURE — 36415 COLL VENOUS BLD VENIPUNCTURE: CPT

## 2024-09-05 RX ORDER — LIDOCAINE 4 G/G
1 PATCH TOPICAL DAILY
Status: DISCONTINUED | OUTPATIENT
Start: 2024-09-05 | End: 2024-09-06 | Stop reason: HOSPADM

## 2024-09-05 RX ORDER — DOCUSATE SODIUM 100 MG/1
100 CAPSULE, LIQUID FILLED ORAL DAILY
Status: DISCONTINUED | OUTPATIENT
Start: 2024-09-05 | End: 2024-09-06 | Stop reason: HOSPADM

## 2024-09-05 RX ADMIN — HYDRALAZINE HYDROCHLORIDE 25 MG: 25 TABLET ORAL at 20:55

## 2024-09-05 RX ADMIN — AMLODIPINE BESYLATE 5 MG: 5 TABLET ORAL at 08:55

## 2024-09-05 RX ADMIN — CARVEDILOL 6.25 MG: 3.12 TABLET, FILM COATED ORAL at 00:03

## 2024-09-05 RX ADMIN — SODIUM CHLORIDE: 9 INJECTION, SOLUTION INTRAVENOUS at 00:16

## 2024-09-05 RX ADMIN — ACETAMINOPHEN 650 MG: 325 TABLET ORAL at 00:03

## 2024-09-05 RX ADMIN — ACETAMINOPHEN 650 MG: 325 TABLET ORAL at 08:54

## 2024-09-05 RX ADMIN — POLYETHYLENE GLYCOL-3350 AND ELECTROLYTES 2000 ML: 236; 6.74; 5.86; 2.97; 22.74 POWDER, FOR SOLUTION ORAL at 17:13

## 2024-09-05 RX ADMIN — CARVEDILOL 6.25 MG: 3.12 TABLET, FILM COATED ORAL at 16:05

## 2024-09-05 RX ADMIN — SODIUM CHLORIDE, PRESERVATIVE FREE 10 ML: 5 INJECTION INTRAVENOUS at 00:16

## 2024-09-05 RX ADMIN — SODIUM CHLORIDE, PRESERVATIVE FREE 10 ML: 5 INJECTION INTRAVENOUS at 20:55

## 2024-09-05 RX ADMIN — SODIUM CHLORIDE, PRESERVATIVE FREE 40 MG: 5 INJECTION INTRAVENOUS at 08:54

## 2024-09-05 RX ADMIN — CARVEDILOL 6.25 MG: 3.12 TABLET, FILM COATED ORAL at 08:55

## 2024-09-05 RX ADMIN — AMIODARONE HYDROCHLORIDE 100 MG: 200 TABLET ORAL at 08:55

## 2024-09-05 RX ADMIN — INSULIN LISPRO 2 UNITS: 100 INJECTION, SOLUTION INTRAVENOUS; SUBCUTANEOUS at 12:03

## 2024-09-05 RX ADMIN — HYDRALAZINE HYDROCHLORIDE 25 MG: 25 TABLET ORAL at 08:55

## 2024-09-05 RX ADMIN — DOCUSATE SODIUM 100 MG: 100 CAPSULE, LIQUID FILLED ORAL at 08:56

## 2024-09-05 RX ADMIN — POTASSIUM CHLORIDE 20 MEQ: 1500 TABLET, EXTENDED RELEASE ORAL at 00:17

## 2024-09-05 RX ADMIN — AMLODIPINE BESYLATE 5 MG: 5 TABLET ORAL at 20:55

## 2024-09-05 RX ADMIN — AMLODIPINE BESYLATE 5 MG: 5 TABLET ORAL at 00:03

## 2024-09-05 RX ADMIN — SODIUM CHLORIDE: 9 INJECTION, SOLUTION INTRAVENOUS at 14:25

## 2024-09-05 ASSESSMENT — PAIN SCALES - GENERAL
PAINLEVEL_OUTOF10: 3
PAINLEVEL_OUTOF10: 1
PAINLEVEL_OUTOF10: 1
PAINLEVEL_OUTOF10: 4

## 2024-09-05 ASSESSMENT — ENCOUNTER SYMPTOMS
ABDOMINAL PAIN: 0
BLOOD IN STOOL: 1
NAUSEA: 0
SHORTNESS OF BREATH: 0
COUGH: 0
DIARRHEA: 0
VOMITING: 0
CONSTIPATION: 0
BACK PAIN: 0

## 2024-09-05 ASSESSMENT — PAIN DESCRIPTION - LOCATION: LOCATION: BACK

## 2024-09-05 ASSESSMENT — PAIN SCALES - WONG BAKER
WONGBAKER_NUMERICALRESPONSE: NO HURT
WONGBAKER_NUMERICALRESPONSE: NO HURT
WONGBAKER_NUMERICALRESPONSE: HURTS LITTLE MORE

## 2024-09-05 ASSESSMENT — PAIN DESCRIPTION - DESCRIPTORS: DESCRIPTORS: ACHING

## 2024-09-05 ASSESSMENT — PAIN DESCRIPTION - ORIENTATION: ORIENTATION: LOWER

## 2024-09-05 NOTE — PROGRESS NOTES
Hospitalist Progress Note    NAME:   Rebecca Jaimes   : 1935   MRN: [id number]     Date/Time: 2024 3:44 PM  Patient PCP: Mj Clay MD    Estimated discharge date: 24-48 hours  Barriers: GI consult      HOSPITAL COURSE:  Rebecca Jaimes is a 88 y.o.  female with PMHx of CHF, diabetes mellitus, HTN, dementia, and CKD who presented to the emergency department with a complaint of bright red blood per rectum that started early morning .  Patient had a syncopal episode as family was cleaning up her blood.  Patient is on Eliquis, last dose evening .  Previously had similar rectal bleeding, per daughter colonoscopy showed diverticulosis.  In the ER, patient is hemodynamically stable. Initial hemoglobin 9.6 (down from 11.6 last year).  Potassium is 3.4. CT of the abdomen shows no evidence of active arterial GI hemorrhage, mild right-sided colitis. Patient admitted for further management. GI consult.     Assessment / Plan:    Rectal bleeding  2. Acute blood loss anemia  - Possible diverticular bleed in the setting of anticoagulation by Eliquis  - Serial H&H, type and screen  - Hold Eliquis  - Monitor hemodynamics  - N.p.o.  - GI has been consulted     3. Possible colitis  - Findings suggestive of colitis on CT.  Denies abdominal pain.  She has no fever or leukocytosis.  Will hold antibiotics for now.  Continue to monitor     4. Syncope  - Possible orthostatic syncope in the setting blood loss/anemia  - Denies lightheadedness or dizziness at this time  - Monitoring H&H, transfuse as indicated  - Telemetry monitoring     5. Atrial fibrillation  - Currently rate controlled A-fib  - Continue amiodarone and carvedilol  - Telemetry monitoring  - Eliquis is held     6. Mild hypokalemia  - Oral KCl  - Follow-up BMP     7. CKD 3  - Renal function is near baseline     8. Hypertension  - Continue amlodipine, carvedilol and hydralazine     9. Dementia  - Recently started on donepezil     10. DM type II  - ISS and

## 2024-09-05 NOTE — CONSULTS
Comprehensive Nutrition Assessment    Type and Reason for Visit:  Initial, Positive Nutrition Screen (MST2 weight loss)    Nutrition Recommendations/Plan:   Adv diet per MD orders  Start Ensure Clear BID  Monitor I/O's, weight, labs     Malnutrition Assessment:  Malnutrition Status:  Moderate malnutrition (09/05/24 1432)    Context:  Acute Illness     Findings of the 6 clinical characteristics of malnutrition:  Energy Intake:  Mild decrease in energy intake (Comment)  Weight Loss:  Greater than 7.5% over 3 months (weight down 8% x 18 months))     Body Fat Loss:  Mild body fat loss     Muscle Mass Loss:  Mild muscle mass loss    Fluid Accumulation:  Unable to assess     Strength:  Not Performed    Nutrition Assessment:    Pt seen today per positive nutrition screening with MST2 weight loss). Pt adm with GI bleed, rectal bleed. Diet: Clear liquids. Pt states tolerated breakfast well today. Will add Ensure Clear BID to assist with kcals and pro intake while diet advancing  per MD orders. Meds and labs reviewed. POC Flu 168,181,129,111,103,149 ,110,156,128mg/dl. A1c 6.7%. No c/o ch/sw. States food \"does not taste right\". missing some teeth. Drink Ensure at home. Let pt know that with Clears diet  - need the Ensure Clear. Once diet adv can include reg Ensure in diet as well. BMI23.9. 9/4/2024  126 lbs  57.2 kg  Stated  2/3/2023  137 lbs  62.1 kg  -    Nutrition Related Findings:    NFPE shows some muscle and fat loss. Last BM 9/3/2024. Wound Type: None       Current Nutrition Intake & Therapies:    Average Meal Intake: 51-75%  Average Supplements Intake: None Ordered  ADULT DIET; Clear Liquid    Anthropometric Measures:  Height: 154.9 cm (5' 0.98\")  Ideal Body Weight (IBW): 105 lbs (48 kg)       Current Body Weight: 57.2 kg (126 lb 1.7 oz), 120.1 % IBW.    Current BMI (kg/m2): 23.8        Weight Adjustment For: No Adjustment                 BMI Categories: Normal Weight (BMI 18.5-24.9)    Estimated Daily Nutrient

## 2024-09-05 NOTE — PROGRESS NOTES
4 Eyes Skin Assessment     NAME:  Rebecca Jaimes  YOB: 1935  MEDICAL RECORD NUMBER:  [id number]    The patient is being assessed for  Admission    I agree that at least one RN has performed a thorough Head to Toe Skin Assessment on the patient. ALL assessment sites listed below have been assessed.      Areas assessed by both nurses:    Head, Face, Ears, Shoulders, Back, Chest, Arms, Elbows, Hands, Sacrum. Buttock, Coccyx, Ischium, Legs. Feet and Heels, and Under Medical Devices         Does the Patient have a Wound? No noted wound(s)       Ronn Prevention initiated by RN: Yes  Wound Care Orders initiated by RN: No    Pressure Injury (Stage 3,4, Unstageable, DTI, NWPT, and Complex wounds) if present, place Wound referral order by RN under : No    New Ostomies, if present place, Ostomy referral order under : No     Nurse 1 eSignature: Electronically signed by KIMBERLY JACOME RN on 9/5/24 at 12:24 AM EDT    **SHARE this note so that the co-signing nurse can place an eSignature**    Nurse 2 eSignature: Electronically signed by Alley Sarmiento RN on 9/5/24 at 3:04 AM EDT

## 2024-09-05 NOTE — PLAN OF CARE
Problem: Nutrition Deficit:  Goal: Optimize nutritional status  Outcome: Progressing  Flowsheets (Taken 9/5/2024 5076)  Nutrient intake appropriate for improving, restoring, or maintaining nutritional needs:   Assess nutritional status and recommend course of action   Recommend appropriate diets, oral nutritional supplements, and vitamin/mineral supplements

## 2024-09-05 NOTE — ED NOTES
ED TO INPATIENT SBAR HANDOFF    Patient Name: Rebecca Jaimes   Preferred Name: Rebecca  : 1935  88 y.o.   Family/Caregiver Present: no   Code Status Order: No Order  PO Status: Clear liquid diet until Midnight tonight and then NPO.   Telemetry Order: Yes  C-SSRS: Risk of Suicide: No Risk  Sitter no   Restraints:   Sepsis Risk Score     Situation  Chief Complaint   Patient presents with    GI Bleeding    Irregular Heart Beat     Brief Description of Patient's Condition: PT came in for rectal bleeding that started at 1000am today. She has a history of the same and has required blood transfusion in the past. No history of intra-abdominal surgeries. She is currently on blood thinner. No associated abdominal pain.   Mental Status: alert, coherent, logical, thought processes intact, and able to concentrate and follow conversation  Arrived from:Home  Imaging:   CT ABDOMEN PELVIS W WO CONTRAST Additional Contrast? None   Final Result      1. No evidence for active arterial gastrointestinal hemorrhage.   2. Mild right-sided colitis.   3. Stable cystic lesion in the pancreatic tail.   4. Large hiatal hernia.   5. Mild cystitis.   6. Bibasilar atelectasis with trace pleural effusions.         Electronically signed by Fredrick Colón        Abnormal labs:   Abnormal Labs Reviewed   CBC WITH AUTO DIFFERENTIAL - Abnormal; Notable for the following components:       Result Value    Hemoglobin 9.6 (*)     Hematocrit 31.0 (*)     MCV 79.9 (*)     MCH 24.7 (*)     Immature Granulocytes % 1 (*)     All other components within normal limits   COMPREHENSIVE METABOLIC PANEL - Abnormal; Notable for the following components:    Potassium 3.4 (*)     Glucose 343 (*)     Creatinine 1.45 (*)     BUN/Creatinine Ratio 10 (*)     Est, Glom Filt Rate 35 (*)     AST 13 (*)     Total Protein 5.8 (*)     Albumin 2.9 (*)     Albumin/Globulin Ratio 1.0 (*)     All other components within normal limits   PROTIME-INR - Abnormal; Notable for the  following components:    Protime 15.3 (*)     INR 1.2 (*)     All other components within normal limits       Background  Allergies: No Known Allergies  History:   Past Medical History:   Diagnosis Date    CHF (congestive heart failure) (HCC)     Diabetes (HCC)     Hypertension     Kidney disease        Assessment  Vitals: MEWS Score: 0  Level of Consciousness: Alert (0)   Vitals:    09/04/24 1600 09/04/24 1715 09/04/24 1923 09/04/24 2000   BP: 111/64 138/89 133/60 128/70   Pulse: 90 98 95    Resp: 25 16 18 16   Temp:   97.8 °F (36.6 °C)    TempSrc:       SpO2: 94% 99%  97%   Weight:       Height:         Deterioration Index (DI): Deterioration Index: 29.31  Deterioration Index (DI) Interventions Performed:    O2 Flow Rate:   O2 Device: O2 Device: None (Room air)  Cardiac Rhythm: Cardiac Rhythm: Atrial fibrillation  Critical Lab Results: [unfilled]  Cultures: Cultures:None  NIH Score: NIH     Active LDA's:   Peripheral IV 09/04/24 Right Antecubital (Active)     Active Central Lines:   Active Wounds:   Active Lenz's:   Active Feeding Tubes:   Administered Medications:   Medications   pantoprazole (PROTONIX) 40 mg in sodium chloride (PF) 0.9 % 10 mL injection (40 mg IntraVENous Given 9/4/24 1609)   iopamidol (ISOVUE-370) 76 % injection 100 mL (100 mLs IntraVENous Given 9/4/24 1738)     Last documented pain medication administration:   Pertinent or High Risk Medications/Drips: no   If Yes, please provide details:   Blood Product Administration: no  If Yes, please provide details:   Process Protocols/Bundles: N/A    Recommendation  Incomplete STAT orders:   Overdue Medications:   Patient Belongings:   Additional Comments: Pt daughter at bedside.   If any further questions, please call Sending RN at [phone]      Admitting Unit Notification  Name of person notified and time: Bonita BHATIA at 0065      Electronically signed by: Electronically signed by Gil Hernandez Jr., RN on 9/4/2024 at 10:03 PM

## 2024-09-05 NOTE — CONSULTS
Gastroenterology Consult Note        Patient: Rebecca Jaimes MRN: [id number]  SSN: [id number]    YOB: 1935  Age: 88 y.o.  Sex: female      Subjective:      Rebecca Jaimes is a 88 y.o. female who is being seen for rectal bleeding.      Patient presents to the emergency department with a complaint of bright red blood per rectum with a syncopal episode as family was cleaning up her blood.  Some a history of from patient, patient hadsome baseline dementia.  Patient is on Eliquis,  Previously had similar rectal bleeding, per daughter colonoscopy showed diverticulosis.  In the ER   Hemoglobin 9.6 mg/dL, down from 11.6 last year.  .  CT of the abdomen shows no evidence of active arterial GI hemorrhage, mild right-sided colitis.patient denied abdominal pain, no constipation or diarrhea.  Patient had one, since patient.  Past Medical History:   Diagnosis Date    CHF (congestive heart failure) (HCC)     Diabetes (HCC)     Hypertension     Kidney disease      Past Surgical History:   Procedure Laterality Date    BREAST BIOPSY      COLONOSCOPY N/A 1/6/2023    COLONOSCOPY performed by Dawn De La Rosa MD at [facility] ENDOSCOPY      Family History   Problem Relation Age of Onset    Heart Disease Mother     Cancer Mother      Social History     Tobacco Use    Smoking status: Never    Smokeless tobacco: Never   Substance Use Topics    Alcohol use: No      Current Facility-Administered Medications   Medication Dose Route Frequency Provider Last Rate Last Admin    docusate sodium (COLACE) capsule 100 mg  100 mg Oral Daily Harvinder Yuong MD   100 mg at 09/05/24 0856    lidocaine 4 % external patch 1 patch  1 patch TransDERmal Daily Phoenix Damon PA-C   1 patch at 09/05/24 1031    pantoprazole (PROTONIX) 40 mg in sodium chloride (PF) 0.9 % 10 mL injection  40 mg IntraVENous Daily Collin De Leon MD   40 mg at 09/05/24 0854    sodium chloride flush 0.9 % injection 5-40 mL  5-40 mL IntraVENous 2 times per  generally  Repeat a colonoscopy in the morning for the evaluation,     Indication risks, option will be discussed with patient's POA   Signed By: Dustin Pineda MD     September 5, 2024         Thank you for allowing me to participate in this patients care    DISCLAIMER:  Please note that this report was generated to utilize Dragon Dictation system, the software is designed to speed over the accuracy.  Every effort has been done to attempt to correct this mistakes upon review, but there still might be some inadvertent errors in grammar and spelling.  Please utilize caution while reading the report due to this  inherent and potential for grammatical mistakes.    This physician  works as a inpatient consult gastroenterologist only, any result not available at time of inpatient discharge and/or clinical follow-up should be managed by the primary care physician or patient's primary gastroenterologist.  It is responsibility of hospitalitis/admitting physician to forward the discharge summary to patient's primary care physician and the primary gastroenterologist regarding further follow-up plan after patient be discharged.    note to patient:  The 21 st century Cures Act make the medical notes like this available to patient in the interest of transparency, however please be advised this is a medical document.  It is intended  as peer to peer communication. It is written in the medical language and may contain abbreviation or verbiage that are unfamiliar. It may appear blunt or direct.  Medical documents are intended to carry relevant information, facts as evident.  And the clinic opinions of the practitioner.  This note maybe transcribed  using a voice dictation system, voice-recognition errors may occur, this should not be taken to alter the contents or meaning for this note.      Cc Referring Physician

## 2024-09-05 NOTE — CARE COORDINATION
09/05/24 1136   Service Assessment   Patient Orientation Alert and Oriented   Cognition Alert   History Provided By Patient   Primary Caregiver Self   Support Systems Children   Patient's Healthcare Decision Maker is: Legal Next of Kin   PCP Verified by CM Yes   Last Visit to PCP Within last 6 months   Prior Functional Level Independent in ADLs/IADLs   Current Functional Level Independent in ADLs/IADLs   Can patient return to prior living arrangement Yes   Ability to make needs known: Good   Family able to assist with home care needs: Yes   Would you like for me to discuss the discharge plan with any other family members/significant others, and if so, who? Yes     Advance Care Planning     General Advance Care Planning (ACP) Conversation    Date of Conversation: 9/5/2024  Conducted with: Patient with Decision Making Capacity  Other persons present: None    Healthcare Decision Maker:   Primary Decision Maker: Isabelle Jaimes - Child - [phone]         Length of Voluntary ACP Conversation in minutes:  <16 minutes (Non-Billable)    Salvador Galo RN CM met with patient at bedside to complete discharge planning assessment. Patient lives at address on [place] alone. She reports using no DME. Patient has no had home health, SNF or IRF in the past. She is agreeable with home health when discharged from the hospital.  [place] of choice for no preference. CM sent referrals. Pending acceptance with home health at this time.

## 2024-09-05 NOTE — PLAN OF CARE
Problem: Discharge Planning  Goal: Discharge to home or other facility with appropriate resources  9/5/2024 0902 by Portia Lewis RN  Outcome: Progressing  9/5/2024 0148 by Bonita Alfredo RN  Outcome: Progressing  Flowsheets (Taken 9/4/2024 2318)  Discharge to home or other facility with appropriate resources: Identify barriers to discharge with patient and caregiver     Problem: Pain  Goal: Verbalizes/displays adequate comfort level or baseline comfort level  9/5/2024 0902 by Portia Lewis RN  Outcome: Progressing  9/5/2024 0148 by Bonita Alfredo RN  Outcome: Progressing     Problem: Safety - Adult  Goal: Free from fall injury  9/5/2024 0902 by Portia Lewis RN  Outcome: Progressing  9/5/2024 0148 by Bonita Alfredo RN  Outcome: Progressing     Problem: ABCDS Injury Assessment  Goal: Absence of physical injury  9/5/2024 0902 by Portia Lewis RN  Outcome: Progressing  9/5/2024 0148 by Bonita Alfredo RN  Outcome: Progressing

## 2024-09-05 NOTE — PROGRESS NOTES
Physician Progress Note      PATIENT:               OSVALDO HINTON  CSN #:                  [id number]  :                       1935  ADMIT DATE:       2024 3:31 PM  DISCH DATE:  RESPONDING  PROVIDER #:        Judy Mensah MD          QUERY TEXT:    Patient admitted with colitis, noted to have atrial fibrillation and is   maintained on Eliquis.  If possible, please document in progress notes and   discharge summary if you are evaluating and/or treating any of the following:?  ?  The medical record reflects the following:  Risk Factors: 87 yo female with HTN, rectal bleeding, anemia  Clinical Indicators:  Documentation states 'Atrial fibrillation with RVR'  Treatment: Eliquis    Thank you,  Elvia Leggett RN, CCDS  Options provided:  -- Secondary hypercoagulable state in a patient with atrial fibrillation  -- Other - I will add my own diagnosis  -- Disagree - Not applicable / Not valid  -- Disagree - Clinically unable to determine / Unknown  -- Refer to Clinical Documentation Reviewer    PROVIDER RESPONSE TEXT:    This patient has secondary hypercoagulable state in a patient with atrial   fibrillation.    Query created by: Elvia Leggett on 2024 1:55 PM      Electronically signed by:  Judy Mensah MD 2024 3:22 PM

## 2024-09-06 ENCOUNTER — ANESTHESIA (OUTPATIENT)
Facility: HOSPITAL | Age: 89
End: 2024-09-06
Payer: MEDICARE

## 2024-09-06 ENCOUNTER — ANESTHESIA EVENT (OUTPATIENT)
Facility: HOSPITAL | Age: 89
End: 2024-09-06
Payer: MEDICARE

## 2024-09-06 VITALS
RESPIRATION RATE: 19 BRPM | WEIGHT: 126 LBS | HEIGHT: 61 IN | TEMPERATURE: 97.7 F | HEART RATE: 106 BPM | BODY MASS INDEX: 23.79 KG/M2 | SYSTOLIC BLOOD PRESSURE: 131 MMHG | DIASTOLIC BLOOD PRESSURE: 82 MMHG | OXYGEN SATURATION: 93 %

## 2024-09-06 LAB
ALBUMIN SERPL-MCNC: 3.5 G/DL (ref 3.5–5)
ALBUMIN/GLOB SERPL: 1 (ref 1.1–2.2)
ALP SERPL-CCNC: 71 U/L (ref 45–117)
ALT SERPL-CCNC: 17 U/L (ref 12–78)
ANION GAP SERPL CALC-SCNC: 8 MMOL/L (ref 2–12)
AST SERPL W P-5'-P-CCNC: 22 U/L (ref 15–37)
BASOPHILS # BLD: 0 K/UL (ref 0–0.1)
BASOPHILS NFR BLD: 1 % (ref 0–1)
BILIRUB SERPL-MCNC: 0.6 MG/DL (ref 0.2–1)
BUN SERPL-MCNC: 10 MG/DL (ref 6–20)
BUN/CREAT SERPL: 10 (ref 12–20)
CA-I BLD-MCNC: 9.4 MG/DL (ref 8.5–10.1)
CHLORIDE SERPL-SCNC: 110 MMOL/L (ref 97–108)
CO2 SERPL-SCNC: 25 MMOL/L (ref 21–32)
CREAT SERPL-MCNC: 1 MG/DL (ref 0.55–1.02)
DIFFERENTIAL METHOD BLD: ABNORMAL
EOSINOPHIL # BLD: 0.1 K/UL (ref 0–0.4)
EOSINOPHIL NFR BLD: 1 % (ref 0–7)
ERYTHROCYTE [DISTWIDTH] IN BLOOD BY AUTOMATED COUNT: 14.7 % (ref 11.5–14.5)
GLOBULIN SER CALC-MCNC: 3.5 G/DL (ref 2–4)
GLUCOSE BLD STRIP.AUTO-MCNC: 161 MG/DL (ref 65–100)
GLUCOSE BLD STRIP.AUTO-MCNC: 170 MG/DL (ref 65–100)
GLUCOSE BLD STRIP.AUTO-MCNC: 180 MG/DL (ref 65–100)
GLUCOSE BLD STRIP.AUTO-MCNC: 183 MG/DL (ref 65–100)
GLUCOSE SERPL-MCNC: 171 MG/DL (ref 65–100)
HCT VFR BLD AUTO: 30.3 % (ref 35–47)
HGB BLD-MCNC: 9.3 G/DL (ref 11.5–16)
IMM GRANULOCYTES # BLD AUTO: 0 K/UL (ref 0–0.04)
IMM GRANULOCYTES NFR BLD AUTO: 0 % (ref 0–0.5)
LYMPHOCYTES # BLD: 2.2 K/UL (ref 0.8–3.5)
LYMPHOCYTES NFR BLD: 33 % (ref 12–49)
MCH RBC QN AUTO: 24.5 PG (ref 26–34)
MCHC RBC AUTO-ENTMCNC: 30.7 G/DL (ref 30–36.5)
MCV RBC AUTO: 79.7 FL (ref 80–99)
MONOCYTES # BLD: 0.5 K/UL (ref 0–1)
MONOCYTES NFR BLD: 7 % (ref 5–13)
NEUTS SEG # BLD: 4 K/UL (ref 1.8–8)
NEUTS SEG NFR BLD: 58 % (ref 32–75)
NRBC # BLD: 0 K/UL (ref 0–0.01)
NRBC BLD-RTO: 0 PER 100 WBC
PERFORMED BY:: ABNORMAL
PLATELET # BLD AUTO: 169 K/UL (ref 150–400)
PMV BLD AUTO: 9.6 FL (ref 8.9–12.9)
POTASSIUM SERPL-SCNC: 3.8 MMOL/L (ref 3.5–5.1)
PROT SERPL-MCNC: 7 G/DL (ref 6.4–8.2)
RBC # BLD AUTO: 3.8 M/UL (ref 3.8–5.2)
SODIUM SERPL-SCNC: 143 MMOL/L (ref 136–145)
WBC # BLD AUTO: 6.7 K/UL (ref 3.6–11)

## 2024-09-06 PROCEDURE — 6360000002 HC RX W HCPCS: Performed by: NURSE ANESTHETIST, CERTIFIED REGISTERED

## 2024-09-06 PROCEDURE — 2709999900 HC NON-CHARGEABLE SUPPLY: Performed by: INTERNAL MEDICINE

## 2024-09-06 PROCEDURE — 3700000001 HC ADD 15 MINUTES (ANESTHESIA): Performed by: INTERNAL MEDICINE

## 2024-09-06 PROCEDURE — 6370000000 HC RX 637 (ALT 250 FOR IP): Performed by: STUDENT IN AN ORGANIZED HEALTH CARE EDUCATION/TRAINING PROGRAM

## 2024-09-06 PROCEDURE — 7100000011 HC PHASE II RECOVERY - ADDTL 15 MIN: Performed by: INTERNAL MEDICINE

## 2024-09-06 PROCEDURE — 80053 COMPREHEN METABOLIC PANEL: CPT

## 2024-09-06 PROCEDURE — 36415 COLL VENOUS BLD VENIPUNCTURE: CPT

## 2024-09-06 PROCEDURE — 6370000000 HC RX 637 (ALT 250 FOR IP): Performed by: INTERNAL MEDICINE

## 2024-09-06 PROCEDURE — 0DJD8ZZ INSPECTION OF LOWER INTESTINAL TRACT, VIA NATURAL OR ARTIFICIAL OPENING ENDOSCOPIC: ICD-10-PCS | Performed by: INTERNAL MEDICINE

## 2024-09-06 PROCEDURE — 2580000003 HC RX 258: Performed by: NURSE ANESTHETIST, CERTIFIED REGISTERED

## 2024-09-06 PROCEDURE — 85025 COMPLETE CBC W/AUTO DIFF WBC: CPT

## 2024-09-06 PROCEDURE — 7100000010 HC PHASE II RECOVERY - FIRST 15 MIN: Performed by: INTERNAL MEDICINE

## 2024-09-06 PROCEDURE — 3600007501: Performed by: INTERNAL MEDICINE

## 2024-09-06 PROCEDURE — 3600007511: Performed by: INTERNAL MEDICINE

## 2024-09-06 PROCEDURE — 2500000003 HC RX 250 WO HCPCS: Performed by: NURSE ANESTHETIST, CERTIFIED REGISTERED

## 2024-09-06 PROCEDURE — 3700000000 HC ANESTHESIA ATTENDED CARE: Performed by: INTERNAL MEDICINE

## 2024-09-06 PROCEDURE — 82962 GLUCOSE BLOOD TEST: CPT

## 2024-09-06 RX ORDER — SODIUM CHLORIDE, SODIUM LACTATE, POTASSIUM CHLORIDE, CALCIUM CHLORIDE 600; 310; 30; 20 MG/100ML; MG/100ML; MG/100ML; MG/100ML
INJECTION, SOLUTION INTRAVENOUS CONTINUOUS PRN
Status: DISCONTINUED | OUTPATIENT
Start: 2024-09-06 | End: 2024-09-06 | Stop reason: SDUPTHER

## 2024-09-06 RX ORDER — PROPOFOL 10 MG/ML
INJECTION, EMULSION INTRAVENOUS PRN
Status: DISCONTINUED | OUTPATIENT
Start: 2024-09-06 | End: 2024-09-06 | Stop reason: SDUPTHER

## 2024-09-06 RX ORDER — LIDOCAINE HYDROCHLORIDE 20 MG/ML
INJECTION, SOLUTION EPIDURAL; INFILTRATION; INTRACAUDAL; PERINEURAL PRN
Status: DISCONTINUED | OUTPATIENT
Start: 2024-09-06 | End: 2024-09-06 | Stop reason: SDUPTHER

## 2024-09-06 RX ADMIN — PROPOFOL 20 MG: 10 INJECTION, EMULSION INTRAVENOUS at 15:10

## 2024-09-06 RX ADMIN — CARVEDILOL 6.25 MG: 3.12 TABLET, FILM COATED ORAL at 09:17

## 2024-09-06 RX ADMIN — AMIODARONE HYDROCHLORIDE 100 MG: 200 TABLET ORAL at 09:17

## 2024-09-06 RX ADMIN — SODIUM CHLORIDE, POTASSIUM CHLORIDE, SODIUM LACTATE AND CALCIUM CHLORIDE: 600; 310; 30; 20 INJECTION, SOLUTION INTRAVENOUS at 14:57

## 2024-09-06 RX ADMIN — AMLODIPINE BESYLATE 5 MG: 5 TABLET ORAL at 09:17

## 2024-09-06 RX ADMIN — PROPOFOL 20 MG: 10 INJECTION, EMULSION INTRAVENOUS at 15:01

## 2024-09-06 RX ADMIN — PROPOFOL 20 MG: 10 INJECTION, EMULSION INTRAVENOUS at 15:05

## 2024-09-06 RX ADMIN — HYDRALAZINE HYDROCHLORIDE 25 MG: 25 TABLET ORAL at 09:17

## 2024-09-06 RX ADMIN — LIDOCAINE HYDROCHLORIDE 60 MG: 20 SOLUTION INTRAVENOUS at 15:01

## 2024-09-06 ASSESSMENT — PAIN SCALES - GENERAL
PAINLEVEL_OUTOF10: 0
PAINLEVEL_OUTOF10: 1

## 2024-09-06 ASSESSMENT — PAIN SCALES - WONG BAKER
WONGBAKER_NUMERICALRESPONSE: NO HURT
WONGBAKER_NUMERICALRESPONSE: NO HURT

## 2024-09-06 NOTE — PLAN OF CARE
Problem: Discharge Planning  Goal: Discharge to home or other facility with appropriate resources  9/6/2024 0832 by Alejo Colon RN  Outcome: Progressing  9/6/2024 0004 by Bonita Alfredo RN  Outcome: Progressing  Flowsheets (Taken 9/5/2024 1950)  Discharge to home or other facility with appropriate resources: Identify barriers to discharge with patient and caregiver     Problem: Pain  Goal: Verbalizes/displays adequate comfort level or baseline comfort level  9/6/2024 0832 by Alejo Colon RN  Outcome: Progressing  9/6/2024 0004 by oBnita Alfredo RN  Outcome: Progressing     Problem: Safety - Adult  Goal: Free from fall injury  9/6/2024 0832 by Alejo Colon RN  Outcome: Progressing  9/6/2024 0004 by Bonita Alfredo RN  Outcome: Progressing     Problem: ABCDS Injury Assessment  Goal: Absence of physical injury  9/6/2024 0832 by Alejo Colon RN  Outcome: Progressing  9/6/2024 0004 by Bonita Alfredo RN  Outcome: Progressing     Problem: Nutrition Deficit:  Goal: Optimize nutritional status  9/6/2024 0832 by Alejo Colon RN  Outcome: Progressing  9/6/2024 0004 by Bonita Alfredo RN  Outcome: Progressing     Problem: Chronic Conditions and Co-morbidities  Goal: Patient's chronic conditions and co-morbidity symptoms are monitored and maintained or improved  9/6/2024 0832 by Alejo Colon RN  Outcome: Progressing  9/6/2024 0004 by Bonita Alfredo RN  Outcome: Progressing

## 2024-09-06 NOTE — PLAN OF CARE
Problem: Pain  Goal: Verbalizes/displays adequate comfort level or baseline comfort level  Outcome: Progressing     Problem: Safety - Adult  Goal: Free from fall injury  Outcome: Progressing     Problem: ABCDS Injury Assessment  Goal: Absence of physical injury  Outcome: Progressing     Problem: Nutrition Deficit:  Goal: Optimize nutritional status  9/6/2024 0004 by Bonita Alfredo RN  Outcome: Progressing  9/5/2024 1446 by Franny Jaimes RD  Outcome: Progressing  Flowsheets (Taken 9/5/2024 1446)  Nutrient intake appropriate for improving, restoring, or maintaining nutritional needs:   Assess nutritional status and recommend course of action   Recommend appropriate diets, oral nutritional supplements, and vitamin/mineral supplements     Problem: Chronic Conditions and Co-morbidities  Goal: Patient's chronic conditions and co-morbidity symptoms are monitored and maintained or improved  Outcome: Progressing

## 2024-09-06 NOTE — DISCHARGE SUMMARY
Discharge Summary    Name: Rebecca Jaimes  [id number]  YOB: 1935 (Age: 88 y.o.)   Date of Admission: 9/4/2024  Date of Discharge: 9/6/2024  Attending Physician: Judy Mensah*    Discharge Diagnosis:   Principal Problem:    Rectal bleeding  Resolved Problems:    * No resolved hospital problems. *       Consultations:  IP CONSULT TO GI      Brief Admission History/Reason for Admission Per Harvinder Young MD:   Rectal bleed    Brief Hospital Course by Main Problems:   Rebecca Jaimes is a 88 y.o.  female with PMHx of CHF, diabetes mellitus, HTN, dementia, and CKD who presented to the emergency department with a complaint of bright red blood per rectum that started early morning 09/04.  Patient had a syncopal episode as family was cleaning up her blood.  Patient is on Eliquis, last dose evening 09/03.  Previously had similar rectal bleeding, per daughter colonoscopy showed diverticulosis.  In the ER, patient is hemodynamically stable. Initial hemoglobin 9.6 (down from 11.6 last year).  Potassium is 3.4. CT of the abdomen shows no evidence of active arterial GI hemorrhage, mild right-sided colitis. Patient admitted for further management. GI consult. Colonoscopy showed internal hemorrhoids, multiple medium-mouthed diverticula in sigmoid colon without evidence of bleed. Patient with normal bowel movements. Hgb stable. Advised close outpatient follow-up with PCP. Medically stable for discharge.     Discharge Exam:  Patient seen and examined by me on discharge day.  Pertinent Findings:  Patient Vitals for the past 24 hrs:   BP Temp Temp src Pulse Resp SpO2 Height   09/06/24 0830 (!) 142/95 98.1 °F (36.7 °C) Oral 55 17 100 % --   09/06/24 0209 127/70 98.1 °F (36.7 °C) Oral 100 18 99 % --   09/05/24 2045 127/81 98.5 °F (36.9 °C) Oral 91 20 94 % --   09/05/24 1605 (!) 143/76 -- -- 83 -- -- --   09/05/24 1545 (!) 143/76 97.5 °F (36.4 °C) Oral 83 22 97 % --   09/05/24 1433 --  -- -- -- -- -- 1.549 m (5' 0.98\")       Gen:    Not in distress  Chest: Clear lungs  CVS:   Regular rate and rhythm.  No edema  Abd:  Soft, not distended, not tender  Neuro: Alert    Discharge/Recent Laboratory Results:  Recent Labs     09/06/24  1214      K 3.8   *   CO2 25   BUN 10   CREATININE 1.00   GLUCOSE 171*   CALCIUM 9.4     Recent Labs     09/06/24  1214   HGB 9.3*   HCT 30.3*   WBC 6.7          Discharge Medications:     Medication List        START taking these medications      pantoprazole 40 MG tablet  Commonly known as: PROTONIX            CONTINUE taking these medications      amLODIPine 5 MG tablet  Commonly known as: NORVASC     carvedilol 6.25 MG tablet  Commonly known as: COREG  TAKE THREE (3) TABLETS BY MOUTH TWO (2) TIMES A DAY.     donepezil 5 MG tablet  Commonly known as: ARICEPT     Hyzaar 50-12.5 MG per tablet  Generic drug: losartan-hydroCHLOROthiazide     Januvia 50 MG tablet  Generic drug: SITagliptin  TAKE ONE (1) TABLET BY MOUTH DAILY.     True Metrix Blood Glucose Test strip  Generic drug: blood glucose test strips  Use to check blood sugar twice daily and as needed            STOP taking these medications      amiodarone 100 MG tablet  Commonly known as: PACERONE     ferrous sulfate 325 (65 Fe) MG EC tablet  Commonly known as: FE TABS 325     hydrALAZINE 25 MG tablet  Commonly known as: APRESOLINE            ASK your doctor about these medications      sodium bicarbonate 325 MG tablet                  Disposition: Home health  Code status: FULL  Recommended diet: diabetic diet  Recommended activity: activity as tolerated  Wound care: None      Follow up with:   PCP : Mj Clay MD  Follow-up Information       Follow up With Specialties Details Why Contact Info    Mj Clay MD Family Medicine Schedule an appointment as soon as possible for a visit in 1 week(s)  [id number] [facility] [id number] [phone]      Dustin Pineda MD Gastroenterology,

## 2024-09-06 NOTE — ANESTHESIA POSTPROCEDURE EVALUATION
Department of Anesthesiology  Postprocedure Note    Patient: Rebecca Jaimes  MRN: [id number]  YOB: 1935  Date of evaluation: 9/6/2024    Procedure Summary       Date: 09/06/24 Room / Location: [facility] ENDO 01 / [facility] ENDOSCOPY    Anesthesia Start: 1457 Anesthesia Stop: 1515    Procedure: COLONOSCOPY DIAGNOSTIC (Lower GI Region) Diagnosis:       Gastrointestinal hemorrhage, unspecified gastrointestinal hemorrhage type      (Gastrointestinal hemorrhage, unspecified gastrointestinal hemorrhage type [K92.2])    Surgeons: Dustin Pineda MD Responsible Provider: Sridhar Márquez MD    Anesthesia Type: MAC ASA Status: 4 - Emergent            Anesthesia Type: MAC    Jayesh Phase I:      Jayesh Phase II:      Anesthesia Post Evaluation    Patient location during evaluation: bedside  Patient participation: waiting for patient participation  Level of consciousness: sleepy but conscious  Pain score: 0  Airway patency: patent  Nausea & Vomiting: no vomiting and no nausea  Cardiovascular status: blood pressure returned to baseline  Respiratory status: acceptable  Hydration status: stable  Pain management: adequate    No notable events documented.

## 2024-09-06 NOTE — CARE COORDINATION
Transition of Care Plan:    RUR: 14%  Prior Level of Functioning: IND  Disposition: Home Health   If SNF or IPR: Date FOC offered:   Date FOC received:   Accepting facility:   Date authorization started with reference number:   Date authorization received and expires:   Follow up appointments:   DME needed:   Transportation at discharge:   IM/IMM Medicare/ letter given: Yes on 9/4/24  Is patient a  and connected with [facility]? NO   If yes, was  transfer form completed and [facility] notified? NA  Caregiver Contact: Isabelle Jaimes [phone]  Discharge Caregiver contacted prior to discharge? No  Care Conference needed? no  Barriers to discharge: Colonoscopy    Patient has been accepted with [facility].  CM sent  orders to [facility] for review. DC Summary is not present at the time of this writing to send to [facility].

## 2024-09-06 NOTE — ANESTHESIA PRE PROCEDURE
Department of Anesthesiology  Preprocedure Note       Name:  Rebecca Jaimes   Age:  88 y.o.  :  1935                                          MRN:  [id number]         Date:  2024      Surgeon: Surgeon(s):  Dustin Pineda MD    Procedure: Procedure(s):  COLONOSCOPY DIAGNOSTIC    Medications prior to admission:   Prior to Admission medications    Medication Sig Start Date End Date Taking? Authorizing Provider   losartan-hydroCHLOROthiazide (HYZAAR) 50-12.5 MG per tablet Take 1 tablet by mouth daily   Yes ProviderGabby MD   donepezil (ARICEPT) 5 MG tablet Take 1 tablet by mouth nightly   Yes Provider, Gabby, MD   JANUVIA 50 MG tablet TAKE ONE (1) TABLET BY MOUTH DAILY. 23  Yes Viri Nicolas MD   carvedilol (COREG) 6.25 MG tablet TAKE THREE (3) TABLETS BY MOUTH TWO (2) TIMES A DAY. 23  Yes Viri Nicolas MD   amLODIPine (NORVASC) 5 MG tablet Take 1 tablet by mouth 2 times daily 3/4/23  Yes Automatic Reconciliation, Ar   blood glucose test strips (TRUE METRIX BLOOD GLUCOSE TEST) strip Use to check blood sugar twice daily and as needed 5/15/23   Viri Nicolas MD   amiodarone (PACERONE) 100 MG tablet Take 1 tablet by mouth daily  Patient not taking: Reported on 2024 2/3/23   Automatic Reconciliation, Ar   ferrous sulfate (FE TABS 325) 325 (65 Fe) MG EC tablet Take 1 tablet by mouth 2 times daily  Patient not taking: Reported on 2024   Automatic Reconciliation, Ar   hydrALAZINE (APRESOLINE) 25 MG tablet Take 1 tablet by mouth 2 times daily  Patient not taking: Reported on 2024   Automatic Reconciliation, Ar   pantoprazole (PROTONIX) 40 MG tablet Take 1 tablet by mouth in the morning and 1 tablet in the evening.  Patient not taking: Reported on 2024   Automatic Reconciliation, Ar   sodium bicarbonate 325 MG tablet Take 1 tablet by mouth 2 times daily  Patient not taking: Reported on 2024    Automatic Reconciliation, Ar

## 2024-09-17 NOTE — PROGRESS NOTES
Physician Progress Note      PATIENT:               OSVALDO HINTON  CSN #:                  [id number]  :                       1935  ADMIT DATE:       2024 3:31 PM  DISCH DATE:        2024 6:53 PM  RESPONDING  PROVIDER #:        Judy Mensah MD          QUERY TEXT:    Patient admitted with GI bleeding, noted to have diverticulosis, colitis, and   hemorrhoids.  If possible, please document in progress notes and discharge   summary the cause of the GI bleeding:  The medical record reflects the following:  Risk Factors:  87 yo female with CHF, DM, HTN  Clinical Indicators: CT of the abdomen shows mild right-sided colitis.   Colonoscopy showed internal hemorrhoids, multiple medium-mouthed diverticula   in sigmoid colon without evidence of bleed.  Treatment:  Hold Eliquis, H&H,    Thank you,  Elvia Leggett RN, CCDS  Options provided:  -- GI bleeding due to diverticulosis  -- GI bleeding due to hemorrhoids  -- GI bleeding due to colitis  -- Other - I will add my own diagnosis  -- Disagree - Not applicable / Not valid  -- Disagree - Clinically unable to determine / Unknown  -- Refer to Clinical Documentation Reviewer    PROVIDER RESPONSE TEXT:    This patient has GI bleeding due to diverticulosis.    Query created by: Elvia Leggett on 2024 2:58 PM      Electronically signed by:  Judy Mensah MD 2024 7:53 AM

## 2024-12-02 ENCOUNTER — HOSPITAL ENCOUNTER (OUTPATIENT)
Facility: HOSPITAL | Age: 88
Discharge: HOME | End: 2024-12-05
Attending: INTERNAL MEDICINE
Payer: MEDICARE

## 2024-12-02 DIAGNOSIS — N63.10 MASS OF RIGHT BREAST, UNSPECIFIED QUADRANT: ICD-10-CM

## 2024-12-02 DIAGNOSIS — D05.11 INTRADUCTAL CARCINOMA IN SITU OF RIGHT BREAST: ICD-10-CM

## 2024-12-02 PROCEDURE — G0279 TOMOSYNTHESIS, MAMMO: HCPCS

## (undated) DEVICE — CANNULA NSL O2 AD 7 FT END-TIDAL CARBON DIOX VENTFLO

## (undated) DEVICE — LINE SAMPLING ADVANCE ORAL NASAL MICROSTREAM O2 TUBING 6.5'

## (undated) DEVICE — MASK O2 MED AD 7 FT 3 IN 1 W/ STD CONN LTX

## (undated) DEVICE — MASK ANES INF SZ 2 PREM TAIL VLV INFL PRT UNSCENTED SGL PT

## (undated) DEVICE — ENDO KIT 1: Brand: MEDLINE INDUSTRIES, INC.

## (undated) DEVICE — KIT ENDOSCOPIC  PROC VIA